# Patient Record
Sex: FEMALE | Race: OTHER | Employment: UNEMPLOYED | URBAN - METROPOLITAN AREA
[De-identification: names, ages, dates, MRNs, and addresses within clinical notes are randomized per-mention and may not be internally consistent; named-entity substitution may affect disease eponyms.]

---

## 2017-04-18 ENCOUNTER — TELEPHONE (OUTPATIENT)
Dept: FAMILY MEDICINE CLINIC | Facility: CLINIC | Age: 52
End: 2017-04-18

## 2017-04-18 DIAGNOSIS — E78.5 HYPERLIPIDEMIA, UNSPECIFIED HYPERLIPIDEMIA TYPE: ICD-10-CM

## 2017-04-18 DIAGNOSIS — I10 ESSENTIAL HYPERTENSION WITH GOAL BLOOD PRESSURE LESS THAN 140/90: Primary | ICD-10-CM

## 2017-04-18 DIAGNOSIS — E55.9 VITAMIN D DEFICIENCY: ICD-10-CM

## 2017-04-19 RX ORDER — LOSARTAN POTASSIUM 25 MG/1
TABLET ORAL
Qty: 90 TABLET | Refills: 0 | Status: SHIPPED | OUTPATIENT
Start: 2017-04-19 | End: 2017-04-26

## 2017-04-19 NOTE — TELEPHONE ENCOUNTER
Pending Prescriptions Disp Refills    Losartan Potassium 25 MG Oral Tab 90 tablet 0     Sig: TAKE ONE TABLET BY MOUTH ONCE DAILY       left message for patient to call back, when patient calls will inform her that labs are needed prior to refill as her l

## 2017-04-26 ENCOUNTER — OFFICE VISIT (OUTPATIENT)
Dept: FAMILY MEDICINE CLINIC | Facility: CLINIC | Age: 52
End: 2017-04-26

## 2017-04-26 VITALS
SYSTOLIC BLOOD PRESSURE: 116 MMHG | OXYGEN SATURATION: 98 % | BODY MASS INDEX: 24.63 KG/M2 | HEIGHT: 63 IN | TEMPERATURE: 98 F | WEIGHT: 139 LBS | DIASTOLIC BLOOD PRESSURE: 82 MMHG | HEART RATE: 78 BPM | RESPIRATION RATE: 16 BRPM

## 2017-04-26 DIAGNOSIS — E55.9 VITAMIN D DEFICIENCY: ICD-10-CM

## 2017-04-26 DIAGNOSIS — Z12.11 ENCOUNTER FOR SCREENING COLONOSCOPY: ICD-10-CM

## 2017-04-26 DIAGNOSIS — Z00.00 ROUTINE GENERAL MEDICAL EXAMINATION AT A HEALTH CARE FACILITY: Primary | ICD-10-CM

## 2017-04-26 DIAGNOSIS — E78.5 HYPERLIPIDEMIA, UNSPECIFIED HYPERLIPIDEMIA TYPE: ICD-10-CM

## 2017-04-26 DIAGNOSIS — I10 ESSENTIAL HYPERTENSION WITH GOAL BLOOD PRESSURE LESS THAN 140/90: ICD-10-CM

## 2017-04-26 PROCEDURE — 99396 PREV VISIT EST AGE 40-64: CPT | Performed by: FAMILY MEDICINE

## 2017-04-26 RX ORDER — ERGOCALCIFEROL 1.25 MG/1
50000 CAPSULE ORAL WEEKLY
Qty: 4 CAPSULE | Refills: 2 | Status: SHIPPED | OUTPATIENT
Start: 2017-04-26 | End: 2017-05-26

## 2017-04-26 RX ORDER — LOSARTAN POTASSIUM 25 MG/1
TABLET ORAL
Qty: 30 TABLET | Refills: 5 | Status: SHIPPED | OUTPATIENT
Start: 2017-04-26 | End: 2017-10-26

## 2017-04-26 NOTE — PATIENT INSTRUCTIONS
Prevention Guidelines, Women Ages 48 to 59  Screening tests and vaccines are an important part of managing your health. Health counseling is essential, too. Below are guidelines for these, for women ages 48 to 59.  Talk with your healthcare provider to ma Lung cancer Adults age 54 to [de-identified] who have smoked Yearly screening in smokers with 30 pack-year history of smoking or who quit within 15 years   Obesity All women in this age group At routine exams   Osteoporosis Women who are postmenopausal Ask your healthc PPSV23: 1 to 2 doses through age 59, or 1 dose at 72 or older (protects against 23 types of pneumococcal bacteria)   Tetanus/diphtheria/pertussis (Td/Tdap) booster All women in this age group Td every 10 years, or a one-time dose of Tdap instead of a Td luther

## 2017-04-26 NOTE — PROGRESS NOTES
Patient presents with:  Physical: room 4--  Lab Results      HPI:  49yr old female with HTN presents for routine adult physical and f/u on labs. Doing well overall. Follows with gyne for wwe, last being about 3yrs ago. No hx of abnl PAPs.  Due for mammogram a week. Disp: 4 capsule Rfl: 2   Losartan Potassium 25 MG Oral Tab TAKE ONE TABLET BY MOUTH ONCE DAILY Disp: 30 tablet Rfl: 5   Fluticasone Propionate 50 MCG/ACT Nasal Suspension 2 sprays by Each Nare route daily.  Disp: 1 Bottle Rfl: 0   Albuterol Sulfate [employer] [email or web address]. aspx? country=[place]    Cervical Cancer Screening:  The USPSTF recommends screening for cervical cancer in women ages 24 to 72 years with cytology (Pap smear) every 3 years or, for women ages 27 to 72 years who want to lengthen t d3 2000u daily otc  - recheck in 4mo  - ergocalciferol 67416 units Oral Cap; Take 1 capsule (50,000 Units total) by mouth once a week. Dispense: 4 capsule;  Refill: 2    Pt understands and agrees with tx plan  RTC in 6mo, sooner if needed      No orders of

## 2017-05-19 ENCOUNTER — TELEPHONE (OUTPATIENT)
Dept: FAMILY MEDICINE CLINIC | Facility: CLINIC | Age: 52
End: 2017-05-19

## 2017-05-19 NOTE — TELEPHONE ENCOUNTER
Peripheral Nerve Block Patient Preparation  Location: Day Surgery  Preanesthetic Checklist:  Patient Identified, Post-Op Pain Mgt at Surgeon Request, Patient Hemodynamically Stable, Risks and Benefits Discussed, Monitors and Equipment Checked, Timeout Performed and Site Marked  Patient Position:  Supine  Sedation:  Midazolam  2 mg   Monitor(s) Used:  NIBP and Pulse Oximetry  Oxygen Supplement:  Room Air  Site Prep:  Cap, Mask, Sterile Gloves and Chloroprep  Requesting Surgeon: Davide      Peripheral Nerve Block Details  Peripheral Nerve Block Type:  Femoral Nerve and Ultrasound Guided  Block Designation: Right  Local Infiltration:  1% Lidocaine  Local Infiltration Volume:  3mL  Needle Type:  Stimulating  Needle Gauge:  22 GG  Needle Length:  9cm  Needle Localization: Ultrasound Guidance     Local Anesthetic:  Ropivacaine  Local Anesthetic Concentration:  0.5%  Local Anesthetic Volume:  30mL  Epinephrine:  None  Aspiration:  Negative  Incremental Injection:  Yes  Paresthesias:  No  Pain on Injection:  No  Catheter Used:  No    Peripheral Nerve Block Note  Pt ID, chart reviewed.  Risks and benefits discussed with patient, agrees to proceed.  Supine.  Chlorhexidine prep.  Ultrasound guidance, in plane technique.  Good anatomical ID of femoral nerve.  1% lido SQ.  #22 9 cm stimplex inserted in plane.  30 cc of 0.5% ropiv injected slowly and incrementally around nerve plexus  No paresthesias.  (+) doughnut sign.  No CNS or CVS Sx.  No heme.  No Cx.  1 image obtained and permanently stored.               Pt called asking for a refill on \"Losartan\". Pharmacy told her no refills left.

## 2017-05-19 NOTE — TELEPHONE ENCOUNTER
Spoke with patients pharmacy and they do have refills for patient, patient was trying to refill on an old prescription bottle. message was left for patient regarding this.

## 2017-10-09 ENCOUNTER — NURSE ONLY (OUTPATIENT)
Dept: FAMILY MEDICINE CLINIC | Facility: CLINIC | Age: 52
End: 2017-10-09

## 2017-10-09 DIAGNOSIS — Z23 NEED FOR MENINGITIS VACCINATION: Primary | ICD-10-CM

## 2017-10-09 PROCEDURE — 90734 MENACWYD/MENACWYCRM VACC IM: CPT | Performed by: NURSE PRACTITIONER

## 2017-10-09 PROCEDURE — 90471 IMMUNIZATION ADMIN: CPT | Performed by: NURSE PRACTITIONER

## 2017-10-26 ENCOUNTER — TELEPHONE (OUTPATIENT)
Dept: FAMILY MEDICINE CLINIC | Facility: CLINIC | Age: 52
End: 2017-10-26

## 2017-10-26 DIAGNOSIS — I10 ESSENTIAL HYPERTENSION WITH GOAL BLOOD PRESSURE LESS THAN 140/90: ICD-10-CM

## 2017-10-26 RX ORDER — LOSARTAN POTASSIUM 25 MG/1
TABLET ORAL
Qty: 90 TABLET | Refills: 0 | Status: SHIPPED | OUTPATIENT
Start: 2017-10-26 | End: 2017-12-05

## 2017-10-26 NOTE — TELEPHONE ENCOUNTER
Signed Prescriptions Disp Refills    Losartan Potassium 25 MG Oral Tab 90 tablet 0      Sig: TAKE ONE TABLET BY MOUTH ONCE DAILY        Authorizing Provider: Talia Tomas        Ordering User: Guzman Shall       patient informed she needs an offi

## 2017-12-05 ENCOUNTER — OFFICE VISIT (OUTPATIENT)
Dept: FAMILY MEDICINE CLINIC | Facility: CLINIC | Age: 52
End: 2017-12-05

## 2017-12-05 VITALS
SYSTOLIC BLOOD PRESSURE: 120 MMHG | HEIGHT: 63 IN | WEIGHT: 139.25 LBS | HEART RATE: 78 BPM | RESPIRATION RATE: 16 BRPM | OXYGEN SATURATION: 98 % | DIASTOLIC BLOOD PRESSURE: 72 MMHG | BODY MASS INDEX: 24.67 KG/M2 | TEMPERATURE: 98 F

## 2017-12-05 DIAGNOSIS — I10 ESSENTIAL HYPERTENSION: Primary | ICD-10-CM

## 2017-12-05 DIAGNOSIS — J30.89 CHRONIC NON-SEASONAL ALLERGIC RHINITIS, UNSPECIFIED TRIGGER: ICD-10-CM

## 2017-12-05 DIAGNOSIS — R05.9 COUGH: ICD-10-CM

## 2017-12-05 PROCEDURE — 99213 OFFICE O/P EST LOW 20 MIN: CPT | Performed by: FAMILY MEDICINE

## 2017-12-05 RX ORDER — LOSARTAN POTASSIUM 25 MG/1
TABLET ORAL
Qty: 90 TABLET | Refills: 1 | Status: SHIPPED | OUTPATIENT
Start: 2017-12-05 | End: 2018-08-27

## 2017-12-05 RX ORDER — ALBUTEROL SULFATE 90 UG/1
2 AEROSOL, METERED RESPIRATORY (INHALATION) EVERY 6 HOURS PRN
Qty: 1 INHALER | Refills: 2 | Status: SHIPPED | OUTPATIENT
Start: 2017-12-05 | End: 2018-08-31 | Stop reason: ALTCHOICE

## 2017-12-08 NOTE — PROGRESS NOTES
Robert Kohler is a 46year old female. HPI:   Patient presents for recheck of her hypertension. Pt has been taking medications as instructed, no medication side effects, home BP monitoring wnl.   Returned from a trip to [facility] about 10d ago is recoveri Used                      Alcohol use:  No                  REVIEW OF SYSTEMS:   GENERAL HEALTH: feels well otherwise  SKIN: denies any unusual skin lesions or rashes  RESPIRATORY: denies shortness of breath with exertion  CARDIOVASCULAR: denies chest pain

## 2017-12-15 ENCOUNTER — TELEPHONE (OUTPATIENT)
Dept: FAMILY MEDICINE CLINIC | Facility: CLINIC | Age: 52
End: 2017-12-15

## 2017-12-15 DIAGNOSIS — E78.5 HYPERLIPIDEMIA, UNSPECIFIED HYPERLIPIDEMIA TYPE: Primary | ICD-10-CM

## 2017-12-15 DIAGNOSIS — E55.9 VITAMIN D DEFICIENCY: ICD-10-CM

## 2017-12-15 DIAGNOSIS — I10 ESSENTIAL HYPERTENSION: ICD-10-CM

## 2017-12-15 NOTE — TELEPHONE ENCOUNTER
Pt called stating orders were to be put in to check her \"Vitamin D level & Cholesterol\" as per the last appointment.  Wants to have blood drawn today within the hour since she is currently fasting. ([facility])

## 2017-12-18 ENCOUNTER — TELEPHONE (OUTPATIENT)
Dept: FAMILY MEDICINE CLINIC | Facility: CLINIC | Age: 52
End: 2017-12-18

## 2017-12-18 DIAGNOSIS — E55.9 VITAMIN D DEFICIENCY: Primary | ICD-10-CM

## 2017-12-18 RX ORDER — ERGOCALCIFEROL 1.25 MG/1
50000 CAPSULE ORAL WEEKLY
Qty: 4 CAPSULE | Refills: 2 | Status: SHIPPED | OUTPATIENT
Start: 2017-12-18 | End: 2018-01-17

## 2017-12-18 NOTE — TELEPHONE ENCOUNTER
Patient informed of test results per 's directive and verbalized understanding, she stated she did not need gel free capsules and ok to send [facility]

## 2017-12-18 NOTE — TELEPHONE ENCOUNTER
----- Message from [facility], DO sent at 12/18/2017  9:05 AM CST -----  Pls inform pt that lipid panel has improved from previous, cont healthy eating habits, low fat/chol diet and regular exercise.  Will not need medication for cholesterol at th

## 2018-02-18 ENCOUNTER — APPOINTMENT (OUTPATIENT)
Dept: GENERAL RADIOLOGY | Facility: HOSPITAL | Age: 53
End: 2018-02-18
Attending: EMERGENCY MEDICINE
Payer: COMMERCIAL

## 2018-02-18 ENCOUNTER — HOSPITAL ENCOUNTER (EMERGENCY)
Facility: HOSPITAL | Age: 53
Discharge: HOME | End: 2018-02-19
Attending: EMERGENCY MEDICINE
Payer: COMMERCIAL

## 2018-02-18 DIAGNOSIS — E83.39 HYPOPHOSPHATEMIA: ICD-10-CM

## 2018-02-18 DIAGNOSIS — R53.1 WEAKNESS GENERALIZED: Primary | ICD-10-CM

## 2018-02-18 LAB
ALBUMIN SERPL-MCNC: 3.9 G/DL (ref 3.5–4.8)
ALP LIVER SERPL-CCNC: 66 U/L (ref 41–108)
ALT SERPL-CCNC: 19 U/L (ref 14–54)
AST SERPL-CCNC: 13 U/L (ref 15–41)
BASOPHILS # BLD AUTO: 0.02 X10(3) UL (ref 0–0.1)
BASOPHILS NFR BLD AUTO: 0.4 %
BILIRUB SERPL-MCNC: 0.4 MG/DL (ref 0.1–2)
BUN BLD-MCNC: 13 MG/DL (ref 8–20)
CALCIUM BLD-MCNC: 8.8 MG/DL (ref 8.3–10.3)
CHLORIDE: 109 MMOL/L (ref 101–111)
CO2: 24 MMOL/L (ref 22–32)
CREAT BLD-MCNC: 0.65 MG/DL (ref 0.55–1.02)
EOSINOPHIL # BLD AUTO: 0.11 X10(3) UL (ref 0–0.3)
EOSINOPHIL NFR BLD AUTO: 2.2 %
ERYTHROCYTE [DISTWIDTH] IN BLOOD BY AUTOMATED COUNT: 13.3 % (ref 11.5–16)
GLUCOSE BLD-MCNC: 144 MG/DL (ref 70–99)
HAV IGM SER QL: 1.9 MG/DL (ref 1.7–3)
HCT VFR BLD AUTO: 37.9 % (ref 34–50)
HGB BLD-MCNC: 12.5 G/DL (ref 12–16)
IMMATURE GRANULOCYTE COUNT: 0.01 X10(3) UL (ref 0–1)
IMMATURE GRANULOCYTE RATIO %: 0.2 %
LYMPHOCYTES # BLD AUTO: 2.25 X10(3) UL (ref 0.9–4)
LYMPHOCYTES NFR BLD AUTO: 44.6 %
M PROTEIN MFR SERPL ELPH: 7.1 G/DL (ref 6.1–8.3)
MCH RBC QN AUTO: 28.4 PG (ref 27–33.2)
MCHC RBC AUTO-ENTMCNC: 33 G/DL (ref 31–37)
MCV RBC AUTO: 86.1 FL (ref 81–100)
MONOCYTES # BLD AUTO: 0.49 X10(3) UL (ref 0.1–1)
MONOCYTES NFR BLD AUTO: 9.7 %
NEUTROPHIL ABS PRELIM: 2.16 X10 (3) UL (ref 1.3–6.7)
NEUTROPHILS # BLD AUTO: 2.16 X10(3) UL (ref 1.3–6.7)
NEUTROPHILS NFR BLD AUTO: 42.9 %
PHOSPHATE SERPL-MCNC: 1.7 MG/DL (ref 2.5–4.9)
PLATELET # BLD AUTO: 203 10(3)UL (ref 150–450)
POTASSIUM SERPL-SCNC: 3.5 MMOL/L (ref 3.6–5.1)
RBC # BLD AUTO: 4.4 X10(6)UL (ref 3.8–5.1)
RED CELL DISTRIBUTION WIDTH-SD: 41.3 FL (ref 35.1–46.3)
SODIUM SERPL-SCNC: 141 MMOL/L (ref 136–144)
TROPONIN: <0.046 NG/ML (ref ?–0.05)
TSI SER-ACNC: 3.09 MIU/ML (ref 0.35–5.5)
WBC # BLD AUTO: 5 X10(3) UL (ref 4–13)

## 2018-02-18 PROCEDURE — 84484 ASSAY OF TROPONIN QUANT: CPT | Performed by: EMERGENCY MEDICINE

## 2018-02-18 PROCEDURE — 93010 ELECTROCARDIOGRAM REPORT: CPT

## 2018-02-18 PROCEDURE — 84100 ASSAY OF PHOSPHORUS: CPT | Performed by: EMERGENCY MEDICINE

## 2018-02-18 PROCEDURE — 99285 EMERGENCY DEPT VISIT HI MDM: CPT

## 2018-02-18 PROCEDURE — 93005 ELECTROCARDIOGRAM TRACING: CPT

## 2018-02-18 PROCEDURE — 71045 X-RAY EXAM CHEST 1 VIEW: CPT | Performed by: EMERGENCY MEDICINE

## 2018-02-18 PROCEDURE — 83735 ASSAY OF MAGNESIUM: CPT | Performed by: EMERGENCY MEDICINE

## 2018-02-18 PROCEDURE — 80053 COMPREHEN METABOLIC PANEL: CPT | Performed by: EMERGENCY MEDICINE

## 2018-02-18 PROCEDURE — 84443 ASSAY THYROID STIM HORMONE: CPT | Performed by: EMERGENCY MEDICINE

## 2018-02-18 PROCEDURE — 36415 COLL VENOUS BLD VENIPUNCTURE: CPT

## 2018-02-18 PROCEDURE — 85025 COMPLETE CBC W/AUTO DIFF WBC: CPT | Performed by: EMERGENCY MEDICINE

## 2018-02-18 RX ORDER — ASPIRIN 81 MG/1
324 TABLET, CHEWABLE ORAL ONCE
Status: COMPLETED | OUTPATIENT
Start: 2018-02-18 | End: 2018-02-18

## 2018-02-19 ENCOUNTER — APPOINTMENT (OUTPATIENT)
Dept: CT IMAGING | Facility: HOSPITAL | Age: 53
End: 2018-02-19
Attending: EMERGENCY MEDICINE
Payer: COMMERCIAL

## 2018-02-19 VITALS
HEART RATE: 62 BPM | RESPIRATION RATE: 14 BRPM | TEMPERATURE: 99 F | WEIGHT: 140 LBS | HEIGHT: 63 IN | SYSTOLIC BLOOD PRESSURE: 123 MMHG | OXYGEN SATURATION: 98 % | BODY MASS INDEX: 24.8 KG/M2 | DIASTOLIC BLOOD PRESSURE: 80 MMHG

## 2018-02-19 LAB
ATRIAL RATE: 96 BPM
P AXIS: 57 DEGREES
P-R INTERVAL: 146 MS
Q-T INTERVAL: 372 MS
QRS DURATION: 96 MS
QTC CALCULATION (BEZET): 469 MS
R AXIS: -40 DEGREES
T AXIS: 24 DEGREES
VENTRICULAR RATE: 96 BPM

## 2018-02-19 PROCEDURE — 70498 CT ANGIOGRAPHY NECK: CPT | Performed by: EMERGENCY MEDICINE

## 2018-02-19 PROCEDURE — 70496 CT ANGIOGRAPHY HEAD: CPT | Performed by: EMERGENCY MEDICINE

## 2018-02-19 NOTE — ED INITIAL ASSESSMENT (HPI)
Onset one hour ago with elevated HR, high BP, shivering and stiffness. Pt reports that this has occurred before while trying to sleep. She reports a sensation of \"heaviness. \"

## 2018-02-19 NOTE — ED NOTES
Pt resting on cart. Alert- skin p/w/d, breathing non labored and with ease. Updated to plan for DC, pt denies pain and IV removed with cath intact and bandage placed. DC papers given to pt, aware of f./u care and cond to return to the ED with.  Instructed o

## 2018-02-19 NOTE — ED PROVIDER NOTES
Patient Seen in: [facility] Emergency Department    History   Patient presents with:  Hypertension (cardiovascular)    Stated Complaint: elevated heart rate, htn, shivering, stiffness - denies fever    HPI    Patient is a 60-year-old female comes em appearing and non-toxic, Alert and oriented X 3   HEENT: Normocephalic, atraumatic. Moist mucous membranes. Pupils equal round reactive to light accommodation, extraocular motion is intact, sclerae white, conjunctiva is pink.   Oropharynx is unremarkable, DIFFERENTIAL WITH PLATELET.   Procedure                               Abnormality         Status                     ---------                               -----------         ------                     CBC W/ DIFFERENTIAL[[id number]] verbal and written discharge and follow-up instructions were provided to help prevent relapse or worsening.   Patient was instructed to follow-up with her primary care provider for further evaluation and treatment, but to return immediately to the ER for wo

## 2018-02-22 ENCOUNTER — TELEPHONE (OUTPATIENT)
Dept: FAMILY MEDICINE CLINIC | Facility: CLINIC | Age: 53
End: 2018-02-22

## 2018-02-24 NOTE — PROGRESS NOTES
HPI:    Patient ID: Rosalina Cool is a 46year old female. HPI   52yr old female with HTN and chronic cough presents for f/u after recent ER visit on 2/19 for generalized weakness.  Had gotten up to go to the bathroom on 2/19 and started shivering and Propionate 50 MCG/ACT Nasal Suspension 2 sprays by Each Nare route daily. Disp: 1 Bottle Rfl: 0   potassium and sodium phosphates 305-700 MG Oral Tab Take 1 tablet by mouth 2 (two) times daily.  Disp: 7 tablet Rfl: 0     Allergies:No Known Allergies   PHYSI 1mo for re-eval, sooner if needed      Orders Placed This Encounter      Phosphorus [E]      Potassium [E]    Meds This Visit:  Signed Prescriptions Disp Refills    ALPRAZolam 0.25 MG Oral Tab 20 tablet 0      Sig: Take 1 tablet (0.25 mg total) by mouth ni

## 2018-08-27 DIAGNOSIS — I10 ESSENTIAL HYPERTENSION: ICD-10-CM

## 2018-08-27 NOTE — TELEPHONE ENCOUNTER
Patient is scheduled for Friday with Dr for med refills, but only has b/p medicine for 2 more days, requesting a few days supply until appointment

## 2018-08-28 RX ORDER — LOSARTAN POTASSIUM 25 MG/1
TABLET ORAL
Qty: 5 TABLET | Refills: 0 | Status: SHIPPED | OUTPATIENT
Start: 2018-08-28 | End: 2018-08-31

## 2018-08-28 NOTE — TELEPHONE ENCOUNTER
Losartan Potassium 25 MG Oral Tab  TAKE ONE TABLET BY MOUTH ONCE DAILY       Disp: 30 tablet Refills: 0    Class: Normal Start: 8/27/2018   For: Essential hypertension  Documented:2 years ago  Hypertension Medications Protocol Passed8/27 11:00 AM   CMP or

## 2018-08-29 DIAGNOSIS — I10 ESSENTIAL HYPERTENSION: ICD-10-CM

## 2018-08-30 NOTE — TELEPHONE ENCOUNTER
LOSARTAN 25MG TAB  Will file in chart as: LOSARTAN POTASSIUM 25 MG Oral Tab  The source prescription was reordered on 8/28/2018 by Mandy Graves.   TAKE ONE TABLET BY MOUTH ONCE DAILY       Disp: 90 tablet Refills: 1    Class: Normal Start: 8/29/2018   For:

## 2018-08-31 ENCOUNTER — OFFICE VISIT (OUTPATIENT)
Dept: FAMILY MEDICINE CLINIC | Facility: CLINIC | Age: 53
End: 2018-08-31
Payer: COMMERCIAL

## 2018-08-31 VITALS
RESPIRATION RATE: 18 BRPM | TEMPERATURE: 98 F | HEART RATE: 64 BPM | OXYGEN SATURATION: 99 % | SYSTOLIC BLOOD PRESSURE: 136 MMHG | DIASTOLIC BLOOD PRESSURE: 80 MMHG | WEIGHT: 133 LBS | HEIGHT: 63 IN | BODY MASS INDEX: 23.57 KG/M2

## 2018-08-31 DIAGNOSIS — E78.49 OTHER HYPERLIPIDEMIA: ICD-10-CM

## 2018-08-31 DIAGNOSIS — E83.39 HYPOPHOSPHATEMIA: ICD-10-CM

## 2018-08-31 DIAGNOSIS — Z00.00 LABORATORY EXAMINATION ORDERED AS PART OF A COMPLETE PHYSICAL EXAMINATION: ICD-10-CM

## 2018-08-31 DIAGNOSIS — Z13.21 ENCOUNTER FOR VITAMIN DEFICIENCY SCREENING: ICD-10-CM

## 2018-08-31 DIAGNOSIS — E55.9 VITAMIN D DEFICIENCY: ICD-10-CM

## 2018-08-31 DIAGNOSIS — E87.6 HYPOKALEMIA: ICD-10-CM

## 2018-08-31 DIAGNOSIS — I10 ESSENTIAL HYPERTENSION: Primary | ICD-10-CM

## 2018-08-31 PROCEDURE — 99213 OFFICE O/P EST LOW 20 MIN: CPT | Performed by: FAMILY MEDICINE

## 2018-08-31 RX ORDER — LOSARTAN POTASSIUM 25 MG/1
TABLET ORAL
Qty: 90 TABLET | Refills: 2 | Status: SHIPPED | OUTPATIENT
Start: 2018-08-31 | End: 2019-01-15

## 2018-08-31 RX ORDER — ACETAMINOPHEN 160 MG
2000 TABLET,DISINTEGRATING ORAL DAILY
Qty: 30 CAPSULE | Refills: 1 | OUTPATIENT
Start: 2018-08-31 | End: 2020-04-08

## 2018-08-31 RX ORDER — LOSARTAN POTASSIUM 25 MG/1
TABLET ORAL
Qty: 90 TABLET | Refills: 1 | OUTPATIENT
Start: 2018-08-31

## 2018-08-31 NOTE — H&P
Deb Lowry is a 46year old female. HPI:  Menses regular, normal flow. Sometimes heavier than usual  Lasts for about 9days.         Current Outpatient Prescriptions:  Losartan Potassium 25 MG Oral Tab TAKE ONE TABLET BY MOUTH ONCE DAILY Disp: 5 tablet

## 2018-08-31 NOTE — PROGRESS NOTES
Quentin Williamson is a 46year old female. HPI:  Pt is here for f/u on BP and meds  Pt has been taking Losartan 25mg daily  Ran out of meds 2 days ago and waiting for refill.    BP elevations few months ago were triggered by anxiety, which is much better now well developed, well nourished,in no apparent distress, pleasant affect  SKIN: no rashes,no suspicious lesions  HEENT: atraumatic, normocephalic,  Conj clear  NECK: supple,no adenopathy,noTM  LUNGS: clear to auscultation  CARDIO: RRR without murmur  GI: NA

## 2018-09-17 ENCOUNTER — TELEPHONE (OUTPATIENT)
Dept: FAMILY MEDICINE CLINIC | Facility: CLINIC | Age: 53
End: 2018-09-17

## 2018-09-17 DIAGNOSIS — E04.2 MULTIPLE THYROID NODULES: ICD-10-CM

## 2018-09-17 DIAGNOSIS — Z13.21 ENCOUNTER FOR VITAMIN DEFICIENCY SCREENING: ICD-10-CM

## 2018-09-17 DIAGNOSIS — Z00.00 LABORATORY EXAMINATION ORDERED AS PART OF A ROUTINE GENERAL MEDICAL EXAMINATION: Primary | ICD-10-CM

## 2018-09-17 DIAGNOSIS — E83.39 HYPOPHOSPHATASIA: ICD-10-CM

## 2018-09-17 DIAGNOSIS — Z12.31 SCREENING MAMMOGRAM, ENCOUNTER FOR: ICD-10-CM

## 2018-09-26 LAB
ABSOLUTE BASOPHILS: 21 CELLS/UL (ref 0–200)
ABSOLUTE EOSINOPHILS: 109 CELLS/UL (ref 15–500)
ABSOLUTE LYMPHOCYTES: 1898 CELLS/UL (ref 850–3900)
ABSOLUTE MONOCYTES: 349 CELLS/UL (ref 200–950)
ABSOLUTE NEUTROPHILS: 1823 CELLS/UL (ref 1500–7800)
ALBUMIN/GLOBULIN RATIO: 1.7 (CALC) (ref 1–2.5)
ALBUMIN: 4.5 G/DL (ref 3.6–5.1)
ALKALINE PHOSPHATASE: 51 U/L (ref 33–130)
ALT: 10 U/L (ref 6–29)
AST: 11 U/L (ref 10–35)
BASOPHILS: 0.5 %
BILIRUBIN, TOTAL: 0.4 MG/DL (ref 0.2–1.2)
BUN: 12 MG/DL (ref 7–25)
CALCIUM: 9.4 MG/DL (ref 8.6–10.4)
CARBON DIOXIDE: 27 MMOL/L (ref 20–32)
CHLORIDE: 107 MMOL/L (ref 98–110)
CHOL/HDLC RATIO: 3.5 (CALC)
CHOLESTEROL, TOTAL: 194 MG/DL
CREATININE: 0.76 MG/DL (ref 0.5–1.05)
EGFR IF AFRICN AM: 105 ML/MIN/1.73M2
EGFR IF NONAFRICN AM: 90 ML/MIN/1.73M2
EOSINOPHILS: 2.6 %
GLOBULIN: 2.7 G/DL (CALC) (ref 1.9–3.7)
GLUCOSE: 98 MG/DL (ref 65–99)
HDL CHOLESTEROL: 55 MG/DL
HEMATOCRIT: 41.5 % (ref 35–45)
HEMOGLOBIN: 13.5 G/DL (ref 11.7–15.5)
LDL-CHOLESTEROL: 119 MG/DL (CALC)
LYMPHOCYTES: 45.2 %
MCH: 27.9 PG (ref 27–33)
MCHC: 32.5 G/DL (ref 32–36)
MCV: 85.7 FL (ref 80–100)
MONOCYTES: 8.3 %
MPV: 11.8 FL (ref 7.5–12.5)
NEUTROPHILS: 43.4 %
NON-HDL CHOLESTEROL: 139 MG/DL (CALC)
PHOSPHATE (AS PHOSPHORUS): 3.1 MG/DL (ref 2.5–4.5)
PLATELET COUNT: 210 THOUSAND/UL (ref 140–400)
POTASSIUM: 4.6 MMOL/L (ref 3.5–5.3)
PROTEIN, TOTAL: 7.2 G/DL (ref 6.1–8.1)
RDW: 13.5 % (ref 11–15)
RED BLOOD CELL COUNT: 4.84 MILLION/UL (ref 3.8–5.1)
SODIUM: 139 MMOL/L (ref 135–146)
TRIGLYCERIDES: 95 MG/DL
TSH W/REFLEX TO FT4: 1.23 MIU/L
VITAMIN D, 25-OH, TOTAL: 19 NG/ML (ref 30–100)
WHITE BLOOD CELL COUNT: 4.2 THOUSAND/UL (ref 3.8–10.8)

## 2018-11-02 ENCOUNTER — TELEPHONE (OUTPATIENT)
Dept: FAMILY MEDICINE CLINIC | Facility: CLINIC | Age: 53
End: 2018-11-02

## 2018-11-12 NOTE — TELEPHONE ENCOUNTER
Written by Rosie Kulkarni MD on 11/1/2018  2:54 PM   [employer] Message: Labs show normal fasting blood sugar.  Cholesterol is slightly elevated compared to previous.  Low-fat/low-cholesterol diet.  Vitamin D level is low.  Please make sure to be taking vi

## 2018-11-12 NOTE — TELEPHONE ENCOUNTER
Called patient and reviewed lab results per Dr. Larisa Rodarte note. States she has not been taking Vitamin D3 supplement. States she has vertigo and thought the Vitamin D3 might be making it worse.   She will try to start taking it again and monitor for side e

## 2018-11-12 NOTE — TELEPHONE ENCOUNTER
Patient stopped by to get Lab Print-out. Having difficulty logging into [employer] from home. Right now does not have INS so she declined to schedule requested Physical and Lab Review Appt.     Wanted to know if Dr. Rex Marcelino can call her to discuss Labs (ove

## 2019-01-07 ENCOUNTER — TELEPHONE (OUTPATIENT)
Dept: FAMILY MEDICINE CLINIC | Facility: CLINIC | Age: 54
End: 2019-01-07

## 2019-01-07 NOTE — TELEPHONE ENCOUNTER
Patient called and stated her insurance won't cover mammogram and x-ray through [facility], needs it changed to [facility] phone # [phone]. Also said insurance told her she needed a referral, but she did not know what for.

## 2019-01-07 NOTE — TELEPHONE ENCOUNTER
Order for [place] of Thyroid and Screening mammogram faxed to [address] center at [phone].

## 2019-01-15 ENCOUNTER — OFFICE VISIT (OUTPATIENT)
Dept: FAMILY MEDICINE CLINIC | Facility: CLINIC | Age: 54
End: 2019-01-15
Payer: MEDICAID

## 2019-01-15 VITALS
BODY MASS INDEX: 23.27 KG/M2 | OXYGEN SATURATION: 99 % | RESPIRATION RATE: 16 BRPM | DIASTOLIC BLOOD PRESSURE: 70 MMHG | SYSTOLIC BLOOD PRESSURE: 130 MMHG | TEMPERATURE: 98 F | HEART RATE: 80 BPM | HEIGHT: 63.58 IN | WEIGHT: 133 LBS

## 2019-01-15 DIAGNOSIS — E04.2 MULTIPLE THYROID NODULES: ICD-10-CM

## 2019-01-15 DIAGNOSIS — R21 RASH: ICD-10-CM

## 2019-01-15 DIAGNOSIS — Z01.419 WELL WOMAN EXAM WITH ROUTINE GYNECOLOGICAL EXAM: Primary | ICD-10-CM

## 2019-01-15 DIAGNOSIS — E55.9 VITAMIN D DEFICIENCY: ICD-10-CM

## 2019-01-15 DIAGNOSIS — Z12.11 SCREEN FOR COLON CANCER: ICD-10-CM

## 2019-01-15 DIAGNOSIS — I10 ESSENTIAL HYPERTENSION: ICD-10-CM

## 2019-01-15 PROCEDURE — 87624 HPV HI-RISK TYP POOLED RSLT: CPT | Performed by: FAMILY MEDICINE

## 2019-01-15 PROCEDURE — 99396 PREV VISIT EST AGE 40-64: CPT | Performed by: FAMILY MEDICINE

## 2019-01-15 RX ORDER — LOSARTAN POTASSIUM 25 MG/1
TABLET ORAL
Qty: 90 TABLET | Refills: 2 | Status: SHIPPED | OUTPATIENT
Start: 2019-01-15 | End: 2019-06-11

## 2019-01-15 NOTE — PROGRESS NOTES
Pauly Daigle is a 48year old female. HPI:  Pt is here for physical/well woman exam.  Doing well overall  Scheduled for MMG later this week  Note a rough patch on R breast, at nipple for 3 months. No change in size.    Sometimes itchiness of R breast at HEALTH: feels well otherwise, mood is good  SKIN: As above  RESPIRATORY: denies shortness of breath with exertion, no cough  CARDIOVASCULAR: denies chest pain on exertion  GI: denies abdominal pain and denies heartburn  : normal bladder  NEURO: denies he indication. Patient defers flu vaccine. Understands risk of not being vaccinated. PAP smear done today with HPV testing  Menstrual pattern appears to be related to perimenopausal state. Will monitor.   If notes menses become heavier or more frequent, sh

## 2019-01-17 ENCOUNTER — TELEPHONE (OUTPATIENT)
Dept: FAMILY MEDICINE CLINIC | Facility: CLINIC | Age: 54
End: 2019-01-17

## 2019-01-17 NOTE — TELEPHONE ENCOUNTER
Patient has Thyroid ultrasound scheduled for 1/19 and patients insurances requested last office note for insurance approval .

## 2019-01-20 LAB — HPV I/H RISK 1 DNA SPEC QL NAA+PROBE: NEGATIVE

## 2019-01-20 PROCEDURE — 82274 ASSAY TEST FOR BLOOD FECAL: CPT | Performed by: FAMILY MEDICINE

## 2019-01-28 ENCOUNTER — TELEPHONE (OUTPATIENT)
Dept: FAMILY MEDICINE CLINIC | Facility: CLINIC | Age: 54
End: 2019-01-28

## 2019-01-28 NOTE — TELEPHONE ENCOUNTER
Called patient and advised of normal mammogram done at [facility]. Asking if Dr. Hallie Avila has seen Thyroid US result yet and what the results of her PAP was. Informed Dr. Hallie Avila sent her a [employer] message with normal PAP result.   States she

## 2019-01-28 NOTE — TELEPHONE ENCOUNTER
Hello Ladies,  Will one of you please call patient and try to assist her with MyChart set-up. Thanks.

## 2019-01-29 NOTE — TELEPHONE ENCOUNTER
annette for pt to call office for help setting up my chart, also left her # to my chart dept [phone]

## 2019-02-22 NOTE — TELEPHONE ENCOUNTER
Dr Cirilo Sellers, I must have looked at the wrong name    Dr. Erika Preston, Please Advise    No future appointments.     LOV      8/31/18-  Advised to follow up  In 3 months     Last Labs           2/18/18     What labs would you like ordered prior to next appt
No future appointments. LOV 8/31/18-  Advised to follow up  In 3 months    Dr. Gisel Torres, I am assuming patient should make appointment with you, not Dr. Vince Moya. Is this correct?      Last Labs 2/18/18    What labs would you like ordered prior to next appt
No future appointments. Patient called and informed of orders per Dr. Melany Omer note. Informed labs should be done while she is fasting at [facility]. Orders faxed to [facility] for mammogram and thyroid ultrasound.   Contact number to ord
Orders for labs entered at [address] per pt request. Please notify pt to get done fasting  Please enter order for Bilat screening MMG at [facility]  Also, please let pt know that I reviewed her chart after her last ov and noted that with her las
Patient LVM stating she needs Dr Anuradha Ford to place lab orders through [address] [place] [address].  She is also requesting a mammogram be ordered for Zoran Conway.     She would like a returned call once orders have been placed so she will know she can schedule the appointme
Patient left voicemail stating that [facility] is requiring previous record of mammogram, or to change order to [facility] so she can schedule appointment, said it is urgent since insurance ends this month,
Previous mammogram report from 2014 faxed to [facility] and patient notified.
Pt seen recently by [facility], unsure what labs were discussed at ov. Pls refer to [facility].
H/O colonoscopy

## 2019-05-30 ENCOUNTER — TELEPHONE (OUTPATIENT)
Dept: FAMILY MEDICINE CLINIC | Facility: CLINIC | Age: 54
End: 2019-05-30

## 2019-05-30 NOTE — TELEPHONE ENCOUNTER
Detailed VMML left for patient with recommendation per Dr. Isaac Linares note. Advised if not improvement she can return call for an appointment or go to a Walk in Clinic.

## 2019-05-30 NOTE — TELEPHONE ENCOUNTER
Pt called to schedule an appointment for today in regard to a Stye in her Eye. Pt has no insurance until June 1st.  (confirmed with [facility] today ref # [id number]). Asked to speak to nurse to find out if something can be prescribed.      Pt di

## 2019-06-10 ENCOUNTER — TELEPHONE (OUTPATIENT)
Dept: FAMILY MEDICINE CLINIC | Facility: CLINIC | Age: 54
End: 2019-06-10

## 2019-06-10 NOTE — TELEPHONE ENCOUNTER
Returned call to patient who states her menses are a little heavier. They are every 17 days and last for approx. 10 days.  Denies any dizziness, or extreme fatigue Advised per Dr. Kerry Aguilera note, if menses were heavier she wanted her to come in for follow u

## 2019-06-10 NOTE — TELEPHONE ENCOUNTER
Pt called for pelvic usg order to [facility], said  discussed with her at last office visit if menstrual cycle continued to be heavy she would order test

## 2019-06-11 ENCOUNTER — OFFICE VISIT (OUTPATIENT)
Dept: FAMILY MEDICINE CLINIC | Facility: CLINIC | Age: 54
End: 2019-06-11
Payer: MEDICAID

## 2019-06-11 VITALS
WEIGHT: 129.81 LBS | SYSTOLIC BLOOD PRESSURE: 118 MMHG | OXYGEN SATURATION: 98 % | HEIGHT: 63.58 IN | BODY MASS INDEX: 22.72 KG/M2 | HEART RATE: 68 BPM | TEMPERATURE: 98 F | RESPIRATION RATE: 16 BRPM | DIASTOLIC BLOOD PRESSURE: 68 MMHG

## 2019-06-11 DIAGNOSIS — N92.0 MENORRHAGIA WITH REGULAR CYCLE: Primary | ICD-10-CM

## 2019-06-11 DIAGNOSIS — Z51.81 ENCOUNTER FOR MEDICATION MONITORING: ICD-10-CM

## 2019-06-11 DIAGNOSIS — I10 ESSENTIAL HYPERTENSION: ICD-10-CM

## 2019-06-11 DIAGNOSIS — E55.9 VITAMIN D DEFICIENCY: ICD-10-CM

## 2019-06-11 PROCEDURE — 99213 OFFICE O/P EST LOW 20 MIN: CPT | Performed by: FAMILY MEDICINE

## 2019-06-11 RX ORDER — LOSARTAN POTASSIUM 25 MG/1
TABLET ORAL
Qty: 90 TABLET | Refills: 2 | Status: SHIPPED | OUTPATIENT
Start: 2019-06-11 | End: 2019-09-27

## 2019-06-11 NOTE — PROGRESS NOTES
Lars Christian is a 48year old female. HPI:  Patient is here for evaluation of heavy menstrual cycles. Menses more frequent in the last couple of cycles. About 217-18 days apart.   Prior to that, in the last 9-10 months , have been about every 20 to 21 lb  08/31/18 : 133 lb  02/23/18 : 136 lb 12.8 oz  02/18/18 : 140 lb  12/05/17 : 139 lb 4 oz    GENERAL: well developed, well nourished,in no apparent distress, pleasant affect  NECK: supple,no adenopathy,noTM  LUNGS: clear to auscultation  CARDIO: RRR with

## 2019-06-13 ENCOUNTER — HOSPITAL ENCOUNTER (OUTPATIENT)
Dept: ULTRASOUND IMAGING | Age: 54
Discharge: HOME | End: 2019-06-13
Attending: FAMILY MEDICINE
Payer: MEDICAID

## 2019-06-13 DIAGNOSIS — N92.0 MENORRHAGIA WITH REGULAR CYCLE: ICD-10-CM

## 2019-06-13 PROCEDURE — 76856 US EXAM PELVIC COMPLETE: CPT | Performed by: FAMILY MEDICINE

## 2019-06-13 PROCEDURE — 76830 TRANSVAGINAL US NON-OB: CPT | Performed by: FAMILY MEDICINE

## 2019-07-09 ENCOUNTER — TELEPHONE (OUTPATIENT)
Dept: FAMILY MEDICINE CLINIC | Facility: CLINIC | Age: 54
End: 2019-07-09

## 2019-07-09 NOTE — TELEPHONE ENCOUNTER
Pt had pelvic ultrasound on 6/13/19 and says no one ever called her with the results. Wants to discuss results.

## 2019-07-10 ENCOUNTER — LAB ENCOUNTER (OUTPATIENT)
Dept: LAB | Age: 54
End: 2019-07-10
Attending: FAMILY MEDICINE
Payer: MEDICAID

## 2019-07-10 DIAGNOSIS — N92.0 MENORRHAGIA WITH REGULAR CYCLE: ICD-10-CM

## 2019-07-10 DIAGNOSIS — I10 ESSENTIAL HYPERTENSION: ICD-10-CM

## 2019-07-10 DIAGNOSIS — Z51.81 ENCOUNTER FOR MEDICATION MONITORING: ICD-10-CM

## 2019-07-10 LAB
ALBUMIN SERPL-MCNC: 4 G/DL (ref 3.4–5)
ALBUMIN/GLOB SERPL: 1.2 {RATIO} (ref 1–2)
ALP LIVER SERPL-CCNC: 55 U/L (ref 41–108)
ALT SERPL-CCNC: 25 U/L (ref 13–56)
ANION GAP SERPL CALC-SCNC: 7 MMOL/L (ref 0–18)
AST SERPL-CCNC: 15 U/L (ref 15–37)
BASOPHILS # BLD AUTO: 0.02 X10(3) UL (ref 0–0.2)
BASOPHILS NFR BLD AUTO: 0.5 %
BILIRUB SERPL-MCNC: 0.4 MG/DL (ref 0.1–2)
BUN BLD-MCNC: 9 MG/DL (ref 7–18)
BUN/CREAT SERPL: 12.9 (ref 10–20)
CALCIUM BLD-MCNC: 9.1 MG/DL (ref 8.5–10.1)
CHLORIDE SERPL-SCNC: 107 MMOL/L (ref 98–112)
CO2 SERPL-SCNC: 25 MMOL/L (ref 21–32)
CREAT BLD-MCNC: 0.7 MG/DL (ref 0.55–1.02)
DEPRECATED RDW RBC AUTO: 44.5 FL (ref 35.1–46.3)
EOSINOPHIL # BLD AUTO: 0.08 X10(3) UL (ref 0–0.7)
EOSINOPHIL NFR BLD AUTO: 1.9 %
ERYTHROCYTE [DISTWIDTH] IN BLOOD BY AUTOMATED COUNT: 13.7 % (ref 11–15)
FSH SERPL-ACNC: 4.5 MIU/ML
GLOBULIN PLAS-MCNC: 3.4 G/DL (ref 2.8–4.4)
GLUCOSE BLD-MCNC: 80 MG/DL (ref 70–99)
HCT VFR BLD AUTO: 41.9 % (ref 35–48)
HGB BLD-MCNC: 13.1 G/DL (ref 12–16)
IMM GRANULOCYTES # BLD AUTO: 0.01 X10(3) UL (ref 0–1)
IMM GRANULOCYTES NFR BLD: 0.2 %
LH SERPL-ACNC: 4.4 MIU/ML
LYMPHOCYTES # BLD AUTO: 1.6 X10(3) UL (ref 1–4)
LYMPHOCYTES NFR BLD AUTO: 38.8 %
M PROTEIN MFR SERPL ELPH: 7.4 G/DL (ref 6.4–8.2)
MCH RBC QN AUTO: 27.8 PG (ref 26–34)
MCHC RBC AUTO-ENTMCNC: 31.3 G/DL (ref 31–37)
MCV RBC AUTO: 89 FL (ref 80–100)
MONOCYTES # BLD AUTO: 0.4 X10(3) UL (ref 0.1–1)
MONOCYTES NFR BLD AUTO: 9.7 %
NEUTROPHILS # BLD AUTO: 2.01 X10 (3) UL (ref 1.5–7.7)
NEUTROPHILS # BLD AUTO: 2.01 X10(3) UL (ref 1.5–7.7)
NEUTROPHILS NFR BLD AUTO: 48.9 %
OSMOLALITY SERPL CALC.SUM OF ELEC: 286 MOSM/KG (ref 275–295)
PLATELET # BLD AUTO: 222 10(3)UL (ref 150–450)
POTASSIUM SERPL-SCNC: 3.9 MMOL/L (ref 3.5–5.1)
RBC # BLD AUTO: 4.71 X10(6)UL (ref 3.8–5.3)
SODIUM SERPL-SCNC: 139 MMOL/L (ref 136–145)
TSI SER-ACNC: 1.56 MIU/ML (ref 0.36–3.74)
WBC # BLD AUTO: 4.1 X10(3) UL (ref 4–11)

## 2019-07-10 PROCEDURE — 80053 COMPREHEN METABOLIC PANEL: CPT

## 2019-07-10 PROCEDURE — 83001 ASSAY OF GONADOTROPIN (FSH): CPT

## 2019-07-10 PROCEDURE — 85025 COMPLETE CBC W/AUTO DIFF WBC: CPT

## 2019-07-10 PROCEDURE — 83002 ASSAY OF GONADOTROPIN (LH): CPT

## 2019-07-10 PROCEDURE — 84443 ASSAY THYROID STIM HORMONE: CPT

## 2019-07-10 PROCEDURE — 36415 COLL VENOUS BLD VENIPUNCTURE: CPT

## 2019-07-11 NOTE — TELEPHONE ENCOUNTER
Results of ultrasound discussed with patient. Small uterine fibroid noted. Normal endometrial thickness. Benign-appearing left ovarian cyst.  States menstrual pattern is not significantly changed compared to last office visit.   Menses have been regular,

## 2019-09-26 DIAGNOSIS — I10 ESSENTIAL HYPERTENSION: ICD-10-CM

## 2019-09-26 RX ORDER — LOSARTAN POTASSIUM 25 MG/1
TABLET ORAL
Qty: 90 TABLET | Refills: 2 | Status: CANCELLED | OUTPATIENT
Start: 2019-09-26

## 2019-09-27 ENCOUNTER — TELEPHONE (OUTPATIENT)
Dept: FAMILY MEDICINE CLINIC | Facility: CLINIC | Age: 54
End: 2019-09-27

## 2019-09-27 ENCOUNTER — OFFICE VISIT (OUTPATIENT)
Dept: FAMILY MEDICINE CLINIC | Facility: CLINIC | Age: 54
End: 2019-09-27
Payer: MEDICAID

## 2019-09-27 VITALS
DIASTOLIC BLOOD PRESSURE: 70 MMHG | HEART RATE: 78 BPM | TEMPERATURE: 97 F | BODY MASS INDEX: 23.8 KG/M2 | RESPIRATION RATE: 16 BRPM | OXYGEN SATURATION: 98 % | HEIGHT: 63.5 IN | SYSTOLIC BLOOD PRESSURE: 120 MMHG | WEIGHT: 136 LBS

## 2019-09-27 DIAGNOSIS — Z28.21 INFLUENZA VACCINATION DECLINED: ICD-10-CM

## 2019-09-27 DIAGNOSIS — T14.8XXA BRUISING: ICD-10-CM

## 2019-09-27 DIAGNOSIS — I10 ESSENTIAL HYPERTENSION: Primary | ICD-10-CM

## 2019-09-27 PROCEDURE — 99214 OFFICE O/P EST MOD 30 MIN: CPT | Performed by: FAMILY MEDICINE

## 2019-09-27 RX ORDER — LOSARTAN POTASSIUM 25 MG/1
TABLET ORAL
Qty: 90 TABLET | Refills: 1 | Status: SHIPPED | OUTPATIENT
Start: 2019-09-27 | End: 2020-07-24

## 2019-09-27 NOTE — PROGRESS NOTES
HPI:    Patient ID: Linda Rose is a 48year old female. HPI   53yr old female presents with c/o bruising to her L middle finger and f/u on HTN. 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  Noticed brui provided. Dr. Radhames Reyes  - given note for work to avoid heavy lifting/pushing/pulling  - monitor for any worsening s/s  - CBC WITH DIFFERENTIAL WITH PLATELET; Future  - IRON AND TIBC; Future  - FERRITIN; Future  - PTT, ACTIVATED;  Future  - PROTHROMBIN TIME (

## 2019-09-27 NOTE — TELEPHONE ENCOUNTER
States the middle finger on her left hand suddenly swelled up and became bruised looking yesterday. No trauma to hand. Went to an UC who told her to go see her PCP. Appt scheduled. Advised to elevate and apply ice.     Future Appointments   Date Time Pro

## 2019-09-28 ENCOUNTER — LAB ENCOUNTER (OUTPATIENT)
Dept: LAB | Age: 54
End: 2019-09-28
Attending: FAMILY MEDICINE
Payer: MEDICAID

## 2019-09-28 DIAGNOSIS — T14.8XXA BRUISING: ICD-10-CM

## 2019-09-28 LAB
APTT PPP: 27.6 SECONDS (ref 25.4–36.1)
BASOPHILS # BLD AUTO: 0.02 X10(3) UL (ref 0–0.2)
BASOPHILS NFR BLD AUTO: 0.5 %
DEPRECATED HBV CORE AB SER IA-ACNC: 8.1 NG/ML (ref 18–340)
DEPRECATED RDW RBC AUTO: 44.9 FL (ref 35.1–46.3)
EOSINOPHIL # BLD AUTO: 0.06 X10(3) UL (ref 0–0.7)
EOSINOPHIL NFR BLD AUTO: 1.6 %
ERYTHROCYTE [DISTWIDTH] IN BLOOD BY AUTOMATED COUNT: 13.8 % (ref 11–15)
HCT VFR BLD AUTO: 39.8 % (ref 35–48)
HGB BLD-MCNC: 12.4 G/DL (ref 12–16)
IMM GRANULOCYTES # BLD AUTO: 0.01 X10(3) UL (ref 0–1)
IMM GRANULOCYTES NFR BLD: 0.3 %
INR BLD: 1 (ref 0.9–1.1)
IRON SATURATION: 8 % (ref 15–50)
IRON SERPL-MCNC: 38 UG/DL (ref 50–170)
LYMPHOCYTES # BLD AUTO: 1.58 X10(3) UL (ref 1–4)
LYMPHOCYTES NFR BLD AUTO: 41.7 %
MCH RBC QN AUTO: 27.4 PG (ref 26–34)
MCHC RBC AUTO-ENTMCNC: 31.2 G/DL (ref 31–37)
MCV RBC AUTO: 88.1 FL (ref 80–100)
MONOCYTES # BLD AUTO: 0.36 X10(3) UL (ref 0.1–1)
MONOCYTES NFR BLD AUTO: 9.5 %
NEUTROPHILS # BLD AUTO: 1.76 X10 (3) UL (ref 1.5–7.7)
NEUTROPHILS # BLD AUTO: 1.76 X10(3) UL (ref 1.5–7.7)
NEUTROPHILS NFR BLD AUTO: 46.4 %
PLATELET # BLD AUTO: 210 10(3)UL (ref 150–450)
PSA SERPL DL<=0.01 NG/ML-MCNC: 13.3 SECONDS (ref 12.2–14.4)
RBC # BLD AUTO: 4.52 X10(6)UL (ref 3.8–5.3)
TOTAL IRON BINDING CAPACITY: 472 UG/DL (ref 240–450)
TRANSFERRIN SERPL-MCNC: 317 MG/DL (ref 200–360)
WBC # BLD AUTO: 3.8 X10(3) UL (ref 4–11)

## 2019-09-28 PROCEDURE — 83550 IRON BINDING TEST: CPT

## 2019-09-28 PROCEDURE — 85025 COMPLETE CBC W/AUTO DIFF WBC: CPT

## 2019-09-28 PROCEDURE — 83540 ASSAY OF IRON: CPT

## 2019-09-28 PROCEDURE — 85730 THROMBOPLASTIN TIME PARTIAL: CPT

## 2019-09-28 PROCEDURE — 82728 ASSAY OF FERRITIN: CPT

## 2019-09-28 PROCEDURE — 36415 COLL VENOUS BLD VENIPUNCTURE: CPT

## 2019-09-28 PROCEDURE — 85610 PROTHROMBIN TIME: CPT

## 2019-09-30 ENCOUNTER — TELEPHONE (OUTPATIENT)
Dept: FAMILY MEDICINE CLINIC | Facility: CLINIC | Age: 54
End: 2019-09-30

## 2019-09-30 NOTE — TELEPHONE ENCOUNTER
Note to work sent to patient per [employer]. Called patient to notify. States she is unable to access her [email or web address]. Work note faxed to her employer at [phone].     Patient is asking about lab test results and whether she should make appt with H[facility]

## 2019-09-30 NOTE — TELEPHONE ENCOUNTER
Patient left voicemail stating she is feeling much better and need a note stating she can return to work now.

## 2019-10-02 RX ORDER — FERROUS SULFATE 325(65) MG
325 TABLET ORAL
Qty: 30 TABLET | Refills: 2 | Status: SHIPPED | OUTPATIENT
Start: 2019-10-02 | End: 2020-04-08

## 2019-10-02 NOTE — TELEPHONE ENCOUNTER
Spoke to patient and gave detailed information regarding lab results and plan of care. Rx sent for ferrous sulfate.

## 2019-10-02 NOTE — TELEPHONE ENCOUNTER
Notes recorded by Jey Carbajal DO on 10/2/2019 at 12:59 PM CDT  Pls inform pt that her ferritin and iron levels are very low. Would recommend she start ferrous sulfate 325mg po daily, pls send rx. Increase iron rich foods in diet.    Clotting fact

## 2020-03-25 ENCOUNTER — TELEPHONE (OUTPATIENT)
Dept: FAMILY MEDICINE CLINIC | Facility: CLINIC | Age: 55
End: 2020-03-25

## 2020-03-25 DIAGNOSIS — Z78.9 HISTORY OF FOREIGN TRAVEL: ICD-10-CM

## 2020-03-25 DIAGNOSIS — R06.2 WHEEZING: ICD-10-CM

## 2020-03-25 DIAGNOSIS — R05.9 COUGH: Primary | ICD-10-CM

## 2020-03-25 PROCEDURE — 99214 OFFICE O/P EST MOD 30 MIN: CPT | Performed by: FAMILY MEDICINE

## 2020-03-25 RX ORDER — ALBUTEROL SULFATE 90 UG/1
2 AEROSOL, METERED RESPIRATORY (INHALATION) EVERY 6 HOURS PRN
Qty: 1 INHALER | Refills: 0 | Status: SHIPPED | OUTPATIENT
Start: 2020-03-25 | End: 2020-04-08

## 2020-03-25 RX ORDER — PREDNISONE 20 MG/1
20 TABLET ORAL DAILY
Qty: 5 TABLET | Refills: 0 | Status: SHIPPED | OUTPATIENT
Start: 2020-03-25 | End: 2020-03-30

## 2020-03-25 NOTE — TELEPHONE ENCOUNTER
Pt called stating she has a bad cough (Pt did cough throughout call). No fever,  Stuffy nose    Pt did travel as follows:    Left for [facility] on 2-26-20.   Took at flight from the [place] airport with a 5 hour layover in [place]  3-19-20

## 2020-03-25 NOTE — TELEPHONE ENCOUNTER
Virtual/Telephone Check-In    Josselyn Tilley verbally consents to a Virtual/Telephone Check-In service on 03/25/20. Patient understands and accepts financial responsibility for any deductible, co-insurance and/or co-pays associated with this service.

## 2020-03-25 NOTE — TELEPHONE ENCOUNTER
Received message: This patient does not qualify for COVID testing under the current guidelines. Please cancel the order and notify the patient.      Pt notified of above  Will take meds and tx as advised   Call back if sxs persist or worsen

## 2020-03-30 ENCOUNTER — HOSPITAL ENCOUNTER (OUTPATIENT)
Dept: GENERAL RADIOLOGY | Age: 55
Discharge: HOME | End: 2020-03-30
Attending: FAMILY MEDICINE
Payer: MEDICAID

## 2020-03-30 ENCOUNTER — TELEPHONE (OUTPATIENT)
Dept: FAMILY MEDICINE CLINIC | Facility: CLINIC | Age: 55
End: 2020-03-30

## 2020-03-30 DIAGNOSIS — R05.9 COUGH: Primary | ICD-10-CM

## 2020-03-30 DIAGNOSIS — R05.9 COUGH: ICD-10-CM

## 2020-03-30 DIAGNOSIS — H65.193 ACUTE EFFUSION OF BOTH MIDDLE EARS: ICD-10-CM

## 2020-03-30 PROCEDURE — 71046 X-RAY EXAM CHEST 2 VIEWS: CPT | Performed by: FAMILY MEDICINE

## 2020-03-30 PROCEDURE — 99213 OFFICE O/P EST LOW 20 MIN: CPT | Performed by: FAMILY MEDICINE

## 2020-03-30 RX ORDER — FLUTICASONE PROPIONATE 50 MCG
2 SPRAY, SUSPENSION (ML) NASAL DAILY
Qty: 16 G | Refills: 0 | Status: SHIPPED | OUTPATIENT
Start: 2020-03-30 | End: 2020-08-12

## 2020-03-30 NOTE — TELEPHONE ENCOUNTER
Virtual/Telephone Check-In    Nancy Bowen verbally consents to a Virtual/Telephone Check-In service on 03/30/20. Patient understands and accepts financial responsibility for any deductible, co-insurance and/or co-pays associated with this service.

## 2020-03-30 NOTE — TELEPHONE ENCOUNTER
Pt called to inform Dr that her cough is no better,  She said she thought Dr told her if not better she would order a chest Xray. Informed Pt of the telephone visits  Being billed to insurance. She understood fully.

## 2020-04-01 NOTE — TELEPHONE ENCOUNTER
• How are you feeling? Much better  • Running any fever? no  • Any continuing cough? Yes non productive  • Any SOB or trouble breathing? no  • Do you feel you are improving? yes  • Are you self-isolating and staying home?   Yes   • Was going to get milk adv

## 2020-04-01 NOTE — TELEPHONE ENCOUNTER
Please call and check on patient status  She was highly suspicious for COVID-19 but testing was denied and pt was given instructions for care and self isolation  Call q 2 days until steady improvement or 14 days.

## 2020-04-03 NOTE — TELEPHONE ENCOUNTER
• How are you feeling? Good   • Running any fever? no  • Any continuing cough? Slight cough   • Any SOB or trouble breathing? no  • Do you feel you are improving? yes  Are you self-isolating and staying home? Yes    Children doing grocery shopping.

## 2020-04-08 ENCOUNTER — TELEMEDICINE (OUTPATIENT)
Dept: FAMILY MEDICINE CLINIC | Facility: CLINIC | Age: 55
End: 2020-04-08

## 2020-04-08 VITALS — WEIGHT: 139 LBS | BODY MASS INDEX: 24 KG/M2

## 2020-04-08 DIAGNOSIS — R79.0 LOW FERRITIN: ICD-10-CM

## 2020-04-08 DIAGNOSIS — Z51.81 ENCOUNTER FOR MEDICATION MONITORING: ICD-10-CM

## 2020-04-08 DIAGNOSIS — I10 ESSENTIAL HYPERTENSION: ICD-10-CM

## 2020-04-08 DIAGNOSIS — R05.9 COUGH: Primary | ICD-10-CM

## 2020-04-08 PROCEDURE — 99214 OFFICE O/P EST MOD 30 MIN: CPT | Performed by: FAMILY MEDICINE

## 2020-04-08 RX ORDER — PANTOPRAZOLE SODIUM 40 MG/1
40 TABLET, DELAYED RELEASE ORAL
Qty: 30 TABLET | Refills: 1 | Status: SHIPPED | OUTPATIENT
Start: 2020-04-08 | End: 2020-08-12

## 2020-04-08 NOTE — PROGRESS NOTES
Rosalina Cool is a 47year old female.     This visit is conducted using telemedicine with live interactive video and audio with pt consent    HPI:  Pt states she is still coughing, sxs now for about 6 weeks  Some days worse than others  Has h/o chronic c Social History    Tobacco Use      Smoking status: Never Smoker      Smokeless tobacco: Never Used    Alcohol use: No      Alcohol/week: 0.0 standard drinks    Drug use: No                  REVIEW OF SYSTEMS:  GENERAL HEALTH: feels well othe COMP METABOLIC PANEL (14); Future  - LIPID PANEL; Future  - HEMOGLOBIN A1C; Future    3. Encounter for medication monitoring  - CBC WITH DIFFERENTIAL WITH PLATELET; Future  - COMP METABOLIC PANEL (14); Future    4.  Low ferritin  Currently not taking supple

## 2020-04-08 NOTE — TELEPHONE ENCOUNTER
Spoke to patient she still has a cough and would like to f/u with the PCP.    Future Appointments   Date Time Provider Abi Isi   5/1/2020  9:20 AM Martha Cook MD [facility]

## 2020-07-22 ENCOUNTER — LAB ENCOUNTER (OUTPATIENT)
Dept: LAB | Age: 55
End: 2020-07-22
Attending: FAMILY MEDICINE
Payer: MEDICAID

## 2020-07-22 DIAGNOSIS — I10 ESSENTIAL HYPERTENSION: ICD-10-CM

## 2020-07-22 DIAGNOSIS — Z51.81 ENCOUNTER FOR MEDICATION MONITORING: ICD-10-CM

## 2020-07-22 DIAGNOSIS — R05.9 COUGH: ICD-10-CM

## 2020-07-22 DIAGNOSIS — R79.0 LOW FERRITIN: ICD-10-CM

## 2020-07-22 LAB
ALBUMIN SERPL-MCNC: 3.7 G/DL (ref 3.4–5)
ALBUMIN/GLOB SERPL: 1 {RATIO} (ref 1–2)
ALP LIVER SERPL-CCNC: 49 U/L (ref 41–108)
ALT SERPL-CCNC: 16 U/L (ref 13–56)
ANION GAP SERPL CALC-SCNC: 5 MMOL/L (ref 0–18)
AST SERPL-CCNC: 13 U/L (ref 15–37)
BASOPHILS # BLD AUTO: 0.02 X10(3) UL (ref 0–0.2)
BASOPHILS NFR BLD AUTO: 0.6 %
BILIRUB SERPL-MCNC: 0.5 MG/DL (ref 0.1–2)
BUN BLD-MCNC: 8 MG/DL (ref 7–18)
BUN/CREAT SERPL: 11 (ref 10–20)
CALCIUM BLD-MCNC: 8.6 MG/DL (ref 8.5–10.1)
CHLORIDE SERPL-SCNC: 109 MMOL/L (ref 98–112)
CHOLEST SMN-MCNC: 174 MG/DL (ref ?–200)
CO2 SERPL-SCNC: 25 MMOL/L (ref 21–32)
CREAT BLD-MCNC: 0.73 MG/DL (ref 0.55–1.02)
DEPRECATED HBV CORE AB SER IA-ACNC: 5.4 NG/ML (ref 18–340)
DEPRECATED RDW RBC AUTO: 44.2 FL (ref 35.1–46.3)
EOSINOPHIL # BLD AUTO: 0.05 X10(3) UL (ref 0–0.7)
EOSINOPHIL NFR BLD AUTO: 1.4 %
ERYTHROCYTE [DISTWIDTH] IN BLOOD BY AUTOMATED COUNT: 13.6 % (ref 11–15)
EST. AVERAGE GLUCOSE BLD GHB EST-MCNC: 111 MG/DL (ref 68–126)
GLOBULIN PLAS-MCNC: 3.6 G/DL (ref 2.8–4.4)
GLUCOSE BLD-MCNC: 98 MG/DL (ref 70–99)
HBA1C MFR BLD HPLC: 5.5 % (ref ?–5.7)
HCT VFR BLD AUTO: 39.4 % (ref 35–48)
HDLC SERPL-MCNC: 51 MG/DL (ref 40–59)
HGB BLD-MCNC: 12.7 G/DL (ref 12–16)
IMM GRANULOCYTES # BLD AUTO: 0.01 X10(3) UL (ref 0–1)
IMM GRANULOCYTES NFR BLD: 0.3 %
LDLC SERPL CALC-MCNC: 104 MG/DL (ref ?–100)
LYMPHOCYTES # BLD AUTO: 1.65 X10(3) UL (ref 1–4)
LYMPHOCYTES NFR BLD AUTO: 47.6 %
M PROTEIN MFR SERPL ELPH: 7.3 G/DL (ref 6.4–8.2)
MCH RBC QN AUTO: 28.5 PG (ref 26–34)
MCHC RBC AUTO-ENTMCNC: 32.2 G/DL (ref 31–37)
MCV RBC AUTO: 88.5 FL (ref 80–100)
MONOCYTES # BLD AUTO: 0.31 X10(3) UL (ref 0.1–1)
MONOCYTES NFR BLD AUTO: 8.9 %
NEUTROPHILS # BLD AUTO: 1.43 X10 (3) UL (ref 1.5–7.7)
NEUTROPHILS # BLD AUTO: 1.43 X10(3) UL (ref 1.5–7.7)
NEUTROPHILS NFR BLD AUTO: 41.2 %
NONHDLC SERPL-MCNC: 123 MG/DL (ref ?–130)
OSMOLALITY SERPL CALC.SUM OF ELEC: 286 MOSM/KG (ref 275–295)
PATIENT FASTING Y/N/NP: YES
PATIENT FASTING Y/N/NP: YES
PLATELET # BLD AUTO: 179 10(3)UL (ref 150–450)
POTASSIUM SERPL-SCNC: 4 MMOL/L (ref 3.5–5.1)
RBC # BLD AUTO: 4.45 X10(6)UL (ref 3.8–5.3)
SODIUM SERPL-SCNC: 139 MMOL/L (ref 136–145)
TRIGL SERPL-MCNC: 93 MG/DL (ref 30–149)
VLDLC SERPL CALC-MCNC: 19 MG/DL (ref 0–30)
WBC # BLD AUTO: 3.5 X10(3) UL (ref 4–11)

## 2020-07-22 PROCEDURE — 36415 COLL VENOUS BLD VENIPUNCTURE: CPT

## 2020-07-22 PROCEDURE — 80061 LIPID PANEL: CPT

## 2020-07-22 PROCEDURE — 80053 COMPREHEN METABOLIC PANEL: CPT

## 2020-07-22 PROCEDURE — 83036 HEMOGLOBIN GLYCOSYLATED A1C: CPT

## 2020-07-22 PROCEDURE — 85025 COMPLETE CBC W/AUTO DIFF WBC: CPT

## 2020-07-22 PROCEDURE — 82728 ASSAY OF FERRITIN: CPT

## 2020-07-23 DIAGNOSIS — I10 ESSENTIAL HYPERTENSION: ICD-10-CM

## 2020-07-23 NOTE — TELEPHONE ENCOUNTER
LOV 4/8/20    LAST LAB 7/22/2020    LAST RX   Losartan Potassium 25 MG Oral Tab 90 tablet 1 9/27/2019    Sig:   TAKE ONE TABLET BY MOUTH ONCE DAILY           Next OV No future appointments.     PROTOCOL Hypertension Medications Protocol Failed7/23 2:09 PM

## 2020-07-24 RX ORDER — LOSARTAN POTASSIUM 25 MG/1
TABLET ORAL
Qty: 90 TABLET | Refills: 0 | Status: SHIPPED | OUTPATIENT
Start: 2020-07-24 | End: 2020-10-20

## 2020-08-09 ENCOUNTER — HOSPITAL ENCOUNTER (EMERGENCY)
Age: 55
Discharge: HOME | End: 2020-08-09
Attending: EMERGENCY MEDICINE
Payer: MEDICAID

## 2020-08-09 VITALS
SYSTOLIC BLOOD PRESSURE: 141 MMHG | BODY MASS INDEX: 24.8 KG/M2 | TEMPERATURE: 98 F | DIASTOLIC BLOOD PRESSURE: 79 MMHG | RESPIRATION RATE: 18 BRPM | HEIGHT: 63 IN | WEIGHT: 140 LBS | HEART RATE: 98 BPM | OXYGEN SATURATION: 99 %

## 2020-08-09 DIAGNOSIS — Z20.822 ENCOUNTER FOR LABORATORY TESTING FOR COVID-19 VIRUS: Primary | ICD-10-CM

## 2020-08-09 PROCEDURE — 99283 EMERGENCY DEPT VISIT LOW MDM: CPT

## 2020-08-09 NOTE — ED PROVIDER NOTES
Patient Seen in: [facility] [place]      History   Patient presents with:  Testing    Stated Complaint: cough; requesting rapid COVID test; aware PED does not have rapid COVID tests    HPI    Patient is a pleasant 22-year-old femal Abdominal:      General: Abdomen is flat. There is no distension. Musculoskeletal: Normal range of motion. Skin:     General: Skin is warm. Neurological:      General: No focal deficit present. Mental Status: She is alert.    Psychiatric:

## 2020-08-12 ENCOUNTER — TELEMEDICINE (OUTPATIENT)
Dept: FAMILY MEDICINE CLINIC | Facility: CLINIC | Age: 55
End: 2020-08-12

## 2020-08-12 VITALS — BODY MASS INDEX: 24.8 KG/M2 | WEIGHT: 140 LBS | HEIGHT: 63 IN

## 2020-08-12 DIAGNOSIS — R05.9 COUGH: ICD-10-CM

## 2020-08-12 DIAGNOSIS — I10 ESSENTIAL HYPERTENSION: Primary | ICD-10-CM

## 2020-08-12 DIAGNOSIS — D72.818 OTHER DECREASED WHITE BLOOD CELL (WBC) COUNT: ICD-10-CM

## 2020-08-12 DIAGNOSIS — N92.0 MENORRHAGIA WITH REGULAR CYCLE: ICD-10-CM

## 2020-08-12 DIAGNOSIS — D50.8 OTHER IRON DEFICIENCY ANEMIA: ICD-10-CM

## 2020-08-12 LAB — SARS-COV-2 BY PCR: NOT DETECTED

## 2020-08-12 PROCEDURE — 99214 OFFICE O/P EST MOD 30 MIN: CPT | Performed by: FAMILY MEDICINE

## 2020-08-12 PROCEDURE — 3008F BODY MASS INDEX DOCD: CPT | Performed by: FAMILY MEDICINE

## 2020-08-12 RX ORDER — FERROUS SULFATE 325(65) MG
325 TABLET ORAL
Qty: 90 TABLET | Refills: 1 | Status: SHIPPED | OUTPATIENT
Start: 2020-08-12 | End: 2021-02-16 | Stop reason: ALTCHOICE

## 2020-08-12 NOTE — PROGRESS NOTES
Rosalina Cool is a 47year old female. Please note that the following visit was completed using two-way, real-time interactive audio and video communication.   This has been done in good gema to provide continuity of care in the best interest of the pro 325 (65 Fe) MG Oral Tab Take 1 tablet (325 mg total) by mouth daily with breakfast. 90 tablet 1   • Losartan Potassium 25 MG Oral Tab TAKE ONE TABLET BY MOUTH ONCE DAILY 90 tablet 0         Past Medical History:   Diagnosis Date   • Essential hypertension results. Delsym or Mucinex DM twice daily as needed. Prev cough flare did not respond to oral steroids, abx, inhalers, antihistamines. Got better with time. Prev CXR 3/2020: Normal  Monitor and call or video visit if sxs persist or worsen.      3. Other

## 2020-08-13 ENCOUNTER — PATIENT MESSAGE (OUTPATIENT)
Dept: FAMILY MEDICINE CLINIC | Facility: CLINIC | Age: 55
End: 2020-08-13

## 2020-08-14 NOTE — TELEPHONE ENCOUNTER
From: Freya Guy  To: Avni Quintanilla MD  Sent: 8/13/2020 5:12 PM CDT  Subject: Test Results Question    Please Dr. Ludivina Hogan let me know what does the test mean.  Positive or negative

## 2020-08-23 RX ORDER — ACETAMINOPHEN 160 MG
2000 TABLET,DISINTEGRATING ORAL DAILY
COMMUNITY
End: 2021-12-26

## 2020-08-27 ENCOUNTER — PATIENT MESSAGE (OUTPATIENT)
Dept: FAMILY MEDICINE CLINIC | Facility: CLINIC | Age: 55
End: 2020-08-27

## 2020-08-27 DIAGNOSIS — R05.3 CHRONIC COUGH: Primary | ICD-10-CM

## 2020-08-27 NOTE — TELEPHONE ENCOUNTER
Per PCP phone visit 8/12/20:  Cough  Having recurrence of her cough. Noted ER visit for coronavirus testing on 8/9/2020, results pending. Patient has been social distancing, in quarantine until knows test results.   Delsym or Mucinex DM twice daily as nee

## 2020-08-27 NOTE — TELEPHONE ENCOUNTER
From: Mehul Zhao  To: Karishma Jamil MD  Sent: 8/27/2020 4:31 PM CDT  Subject: Referral Harlan Gregg   I would like to have a referral for an ENT doctor to check on my chronic cough.      Thanks   [employer]

## 2020-09-03 ENCOUNTER — PATIENT MESSAGE (OUTPATIENT)
Dept: FAMILY MEDICINE CLINIC | Facility: CLINIC | Age: 55
End: 2020-09-03

## 2020-09-03 NOTE — TELEPHONE ENCOUNTER
From: Rosalina Cool  To: Karen Del Toro MD  Sent: 9/3/2020 10:11 AM CDT  Subject: Test Results Tk Nicely Dr. Alexander Hoyos,  I remember on our evisit you said that I need to do some more blood work in September. I don't see the order on my chart.    I

## 2020-09-03 NOTE — TELEPHONE ENCOUNTER
Per phone visit 8/12/20:Other decreased white blood cell (WBC) count  White blood cell count, 3500. ANC sl low at 1430. Previous WBC from 9/2019, 3800. Slightly low.   Hb and plt cts normal.   Recheck CBC, along with ESR and CRP with labs in about one kusum

## 2020-09-04 ENCOUNTER — PATIENT MESSAGE (OUTPATIENT)
Dept: FAMILY MEDICINE CLINIC | Facility: CLINIC | Age: 55
End: 2020-09-04

## 2020-09-04 NOTE — TELEPHONE ENCOUNTER
From: Ksenia Monroe  To:  Padmini Gonzales MD  Sent: 9/4/2020 7:15 AM CDT  Subject: Non-Urgent Medical Question    Do I need to be fasting for this test?

## 2020-09-08 ENCOUNTER — LAB ENCOUNTER (OUTPATIENT)
Dept: LAB | Age: 55
End: 2020-09-08
Attending: FAMILY MEDICINE
Payer: MEDICAID

## 2020-09-08 DIAGNOSIS — I10 ESSENTIAL HYPERTENSION: ICD-10-CM

## 2020-09-08 DIAGNOSIS — D50.8 OTHER IRON DEFICIENCY ANEMIA: ICD-10-CM

## 2020-09-08 DIAGNOSIS — R05.9 COUGH: ICD-10-CM

## 2020-09-08 DIAGNOSIS — N92.0 MENORRHAGIA WITH REGULAR CYCLE: ICD-10-CM

## 2020-09-08 DIAGNOSIS — D72.818 OTHER DECREASED WHITE BLOOD CELL (WBC) COUNT: ICD-10-CM

## 2020-09-08 LAB
BASOPHILS # BLD AUTO: 0.03 X10(3) UL (ref 0–0.2)
BASOPHILS NFR BLD AUTO: 0.7 %
CRP SERPL-MCNC: <0.29 MG/DL (ref ?–0.3)
DEPRECATED RDW RBC AUTO: 48.2 FL (ref 35.1–46.3)
EOSINOPHIL # BLD AUTO: 0.09 X10(3) UL (ref 0–0.7)
EOSINOPHIL NFR BLD AUTO: 2 %
ERYTHROCYTE [DISTWIDTH] IN BLOOD BY AUTOMATED COUNT: 14.7 % (ref 11–15)
HCT VFR BLD AUTO: 42.5 % (ref 35–48)
HGB BLD-MCNC: 13.1 G/DL (ref 12–16)
IMM GRANULOCYTES # BLD AUTO: 0.01 X10(3) UL (ref 0–1)
IMM GRANULOCYTES NFR BLD: 0.2 %
LYMPHOCYTES # BLD AUTO: 1.69 X10(3) UL (ref 1–4)
LYMPHOCYTES NFR BLD AUTO: 36.7 %
MCH RBC QN AUTO: 27.6 PG (ref 26–34)
MCHC RBC AUTO-ENTMCNC: 30.8 G/DL (ref 31–37)
MCV RBC AUTO: 89.7 FL (ref 80–100)
MONOCYTES # BLD AUTO: 0.46 X10(3) UL (ref 0.1–1)
MONOCYTES NFR BLD AUTO: 10 %
NEUTROPHILS # BLD AUTO: 2.32 X10 (3) UL (ref 1.5–7.7)
NEUTROPHILS # BLD AUTO: 2.32 X10(3) UL (ref 1.5–7.7)
NEUTROPHILS NFR BLD AUTO: 50.4 %
PLATELET # BLD AUTO: 203 10(3)UL (ref 150–450)
RBC # BLD AUTO: 4.74 X10(6)UL (ref 3.8–5.3)
SED RATE-ML: 11 MM/HR (ref 0–25)
WBC # BLD AUTO: 4.6 X10(3) UL (ref 4–11)

## 2020-09-08 PROCEDURE — 85652 RBC SED RATE AUTOMATED: CPT

## 2020-09-08 PROCEDURE — 36415 COLL VENOUS BLD VENIPUNCTURE: CPT

## 2020-09-08 PROCEDURE — 86140 C-REACTIVE PROTEIN: CPT

## 2020-09-08 PROCEDURE — 85025 COMPLETE CBC W/AUTO DIFF WBC: CPT

## 2020-09-09 ENCOUNTER — OFFICE VISIT (OUTPATIENT)
Dept: FAMILY MEDICINE CLINIC | Facility: CLINIC | Age: 55
End: 2020-09-09
Payer: MEDICAID

## 2020-09-09 VITALS
OXYGEN SATURATION: 98 % | SYSTOLIC BLOOD PRESSURE: 128 MMHG | HEIGHT: 63 IN | WEIGHT: 136 LBS | BODY MASS INDEX: 24.1 KG/M2 | TEMPERATURE: 98 F | HEART RATE: 72 BPM | DIASTOLIC BLOOD PRESSURE: 80 MMHG | RESPIRATION RATE: 18 BRPM

## 2020-09-09 DIAGNOSIS — Z01.419 ROUTINE GYNECOLOGICAL EXAMINATION: Primary | ICD-10-CM

## 2020-09-09 DIAGNOSIS — I10 ESSENTIAL HYPERTENSION: ICD-10-CM

## 2020-09-09 DIAGNOSIS — R21 RASH: ICD-10-CM

## 2020-09-09 DIAGNOSIS — J98.01 COUGH DUE TO BRONCHOSPASM: ICD-10-CM

## 2020-09-09 DIAGNOSIS — Z12.31 VISIT FOR SCREENING MAMMOGRAM: ICD-10-CM

## 2020-09-09 DIAGNOSIS — Z12.11 SCREENING FOR COLON CANCER: ICD-10-CM

## 2020-09-09 DIAGNOSIS — N92.6 MISSED MENSES: ICD-10-CM

## 2020-09-09 DIAGNOSIS — R79.0 LOW FERRITIN: ICD-10-CM

## 2020-09-09 LAB — CONTROL LINE PRESENT WITH A CLEAR BACKGROUND (YES/NO): YES YES/NO

## 2020-09-09 PROCEDURE — 99396 PREV VISIT EST AGE 40-64: CPT | Performed by: FAMILY MEDICINE

## 2020-09-09 PROCEDURE — 81025 URINE PREGNANCY TEST: CPT | Performed by: FAMILY MEDICINE

## 2020-09-09 PROCEDURE — 3079F DIAST BP 80-89 MM HG: CPT | Performed by: FAMILY MEDICINE

## 2020-09-09 PROCEDURE — 99213 OFFICE O/P EST LOW 20 MIN: CPT | Performed by: FAMILY MEDICINE

## 2020-09-09 PROCEDURE — 3008F BODY MASS INDEX DOCD: CPT | Performed by: FAMILY MEDICINE

## 2020-09-09 PROCEDURE — 3074F SYST BP LT 130 MM HG: CPT | Performed by: FAMILY MEDICINE

## 2020-09-09 NOTE — PROGRESS NOTES
Unknown Peeling is a 47year old female. HPI:  Pt is here for physical/WWE  Has been walking regularly  Eating healthy with some wt loss  Menses usually very regular  Missed menses last month for the first time  LMP: 7/22/2020, normal flow.   , sexu • Losartan Potassium 25 MG Oral Tab TAKE ONE TABLET BY MOUTH ONCE DAILY 90 tablet 0   • Budesonide-Formoterol Fumarate (SYMBICORT) 160-4.5 MCG/ACT Inhalation Aerosol Inhale 2 puffs into the lungs 2 (two) times daily.  Rinse mouth after use 1 Inhaler 0 cyanosis, clubbing or edema  NEURO: A&O x3, no focal deficits  Normal gait  Breast exam: symmetric, few scattered fibrocystic changes, no dominant masses, no nipple or skin changes, no axillary or SC LAD   Pelvic: no external lesions. No bladder prolapse. symptoms. Discussed nature of asthma. [place] but has never tried this. Advised to take 2 puffs twice daily, rinse mouth after use. Continue albuterol MDI as needed. Can use expectorant as needed.   Antihistamines and PPI treatment

## 2020-09-11 ENCOUNTER — PATIENT MESSAGE (OUTPATIENT)
Dept: FAMILY MEDICINE CLINIC | Facility: CLINIC | Age: 55
End: 2020-09-11

## 2020-09-11 RX ORDER — BUDESONIDE AND FORMOTEROL FUMARATE DIHYDRATE 160; 4.5 UG/1; UG/1
2 AEROSOL RESPIRATORY (INHALATION) 2 TIMES DAILY
Qty: 1 INHALER | Refills: 0 | Status: SHIPPED | OUTPATIENT
Start: 2020-09-11 | End: 2020-09-16

## 2020-09-11 NOTE — TELEPHONE ENCOUNTER
From: Unknown Peeling  To: Kathi Rosa MD  Sent: 9/11/2020 9:16 AM CDT  Subject: Prescription Question    Can you please send a prescription for symbicort. I can't find the one I had before.   Thanks

## 2020-09-14 ENCOUNTER — PATIENT MESSAGE (OUTPATIENT)
Dept: FAMILY MEDICINE CLINIC | Facility: CLINIC | Age: 55
End: 2020-09-14

## 2020-09-16 RX ORDER — FLUTICASONE PROPIONATE AND SALMETEROL 250; 50 UG/1; UG/1
1 POWDER RESPIRATORY (INHALATION) EVERY 12 HOURS SCHEDULED
Qty: 60 EACH | Refills: 1 | Status: SHIPPED | OUTPATIENT
Start: 2020-09-16 | End: 2020-12-15

## 2020-09-16 NOTE — TELEPHONE ENCOUNTER
From: Herve Colbert  To:  Philip Coe MD  Sent: 9/14/2020 3:50 PM CDT  Subject: Morgan Ruvalcaba Dr,  I guess insurance doesn't want to cover my inhaler so they are waiting on you to either call the insurance or prescribe something diffe

## 2020-09-16 NOTE — TELEPHONE ENCOUNTER
Formulary checked  Changed to Advair Diskus. Please check with pharmacy to make sure it is covered and pt gets started on it ASAP. Will need to notify pt of change from MDI to Diskus.    Also there is a PA form that I put in the Nurse bin yesterday regardin

## 2020-09-16 NOTE — TELEPHONE ENCOUNTER
Called Pharmacy. Advair Diskus not covered, states Symbicort is. Informed we were told yesterday it was not covered. Pharmacist checked further and only brand name is covered.   She will dispense brand name Symbicort per previous prescription and cancel

## 2020-09-16 NOTE — TELEPHONE ENCOUNTER
Symbicort inhaler not on formulary. [employer]. pdf  Pg 97 Respiratory/Pulmonary agents    Dr. Yolanda Venegas,  Please advise

## 2020-09-21 PROBLEM — K21.9 LARYNGOPHARYNGEAL REFLUX (LPR): Status: ACTIVE | Noted: 2020-09-21

## 2020-09-21 PROBLEM — R05.9 COUGH: Status: ACTIVE | Noted: 2020-09-21

## 2020-10-02 ENCOUNTER — PATIENT MESSAGE (OUTPATIENT)
Dept: FAMILY MEDICINE CLINIC | Facility: CLINIC | Age: 55
End: 2020-10-02

## 2020-10-02 DIAGNOSIS — J98.01 COUGH DUE TO BRONCHOSPASM: ICD-10-CM

## 2020-10-02 DIAGNOSIS — R06.2 WHEEZING: ICD-10-CM

## 2020-10-02 DIAGNOSIS — R05.9 COUGH: Primary | ICD-10-CM

## 2020-10-02 NOTE — TELEPHONE ENCOUNTER
Cough on and off - since march not going away     Feels something in the middle of chest when she coughs and she can see twitching (thinks it's muscular) and there is something spasms - this has been going for 3 days.  She says this also happens when she be

## 2020-10-02 NOTE — TELEPHONE ENCOUNTER
From: Pauly Daigle  To: Lisa Christianson MD  Sent: 10/2/2020 9:49 AM CDT  Subject: Non-Urgent Dominguez Morocho,  As you can see on my chart I went to see the ENT and he said it is acid reflux.  I have been taking the [address] since but

## 2020-10-02 NOTE — TELEPHONE ENCOUNTER
I spoke with the patient and she is using symbicort, not albuterol. After talking with Dr. Jade Morocho patient advised to see pulmonary. Referral placed and information given to patient via [employer] but we also discussed this over the phone. Pt agreeable.  I

## 2020-10-20 ENCOUNTER — PATIENT MESSAGE (OUTPATIENT)
Dept: FAMILY MEDICINE CLINIC | Facility: CLINIC | Age: 55
End: 2020-10-20

## 2020-10-20 DIAGNOSIS — I10 ESSENTIAL HYPERTENSION: ICD-10-CM

## 2020-10-20 RX ORDER — LOSARTAN POTASSIUM 25 MG/1
TABLET ORAL
Qty: 90 TABLET | Refills: 0 | Status: SHIPPED | OUTPATIENT
Start: 2020-10-20 | End: 2021-02-16

## 2020-10-20 NOTE — TELEPHONE ENCOUNTER
LOV 9/9/20    LAST LAB 7/22/20    LAST RX   Losartan Potassium 25 MG Oral Tab 90 tablet 0 7/24/2020    Sig:   TAKE ONE TABLET BY MOUTH ONCE DAILY           Next OV No future appointments.     PROTOCOL   Hypertension Medications Protocol Uiouhk90/20/2020 12:

## 2020-10-20 NOTE — TELEPHONE ENCOUNTER
From: Susanne Perez  To: Allison Qiu MD  Sent: 10/20/2020 8:42 AM CDT  Subject: Prescription Nancy Eaton  I have no refill for losartan.  I just called the pharmacy and they said they need Dr. rhodes.     Thanks

## 2020-12-02 ENCOUNTER — HOSPITAL ENCOUNTER (OUTPATIENT)
Dept: MAMMOGRAPHY | Age: 55
Discharge: HOME | End: 2020-12-02
Attending: FAMILY MEDICINE
Payer: MEDICAID

## 2020-12-02 DIAGNOSIS — Z12.31 VISIT FOR SCREENING MAMMOGRAM: ICD-10-CM

## 2020-12-02 PROCEDURE — 77063 BREAST TOMOSYNTHESIS BI: CPT | Performed by: FAMILY MEDICINE

## 2020-12-02 PROCEDURE — 77067 SCR MAMMO BI INCL CAD: CPT | Performed by: FAMILY MEDICINE

## 2021-01-26 PROCEDURE — 88305 TISSUE EXAM BY PATHOLOGIST: CPT | Performed by: OBSTETRICS & GYNECOLOGY

## 2021-02-01 ENCOUNTER — PATIENT MESSAGE (OUTPATIENT)
Dept: FAMILY MEDICINE CLINIC | Facility: CLINIC | Age: 56
End: 2021-02-01

## 2021-02-01 DIAGNOSIS — Z12.11 SCREENING FOR COLON CANCER: Primary | ICD-10-CM

## 2021-02-02 NOTE — TELEPHONE ENCOUNTER
From: Diane Monday  To: Brayan García MD  Sent: 2/1/2021 2:33 PM CST  Subject: Referral Álvaro Catherine   I am trying to schedule for my colonoscopy but they don't take my insurance.  Is there someone else in [facility] that

## 2021-02-09 ENCOUNTER — APPOINTMENT (OUTPATIENT)
Dept: GENERAL RADIOLOGY | Age: 56
End: 2021-02-09
Attending: EMERGENCY MEDICINE
Payer: MEDICAID

## 2021-02-09 ENCOUNTER — TELEPHONE (OUTPATIENT)
Dept: FAMILY MEDICINE CLINIC | Facility: CLINIC | Age: 56
End: 2021-02-09

## 2021-02-09 ENCOUNTER — HOSPITAL ENCOUNTER (EMERGENCY)
Age: 56
Discharge: HOME | End: 2021-02-09
Attending: EMERGENCY MEDICINE
Payer: MEDICAID

## 2021-02-09 VITALS
RESPIRATION RATE: 18 BRPM | WEIGHT: 135 LBS | SYSTOLIC BLOOD PRESSURE: 140 MMHG | HEART RATE: 68 BPM | TEMPERATURE: 98 F | DIASTOLIC BLOOD PRESSURE: 83 MMHG | BODY MASS INDEX: 23.92 KG/M2 | HEIGHT: 63 IN | OXYGEN SATURATION: 100 %

## 2021-02-09 DIAGNOSIS — Z23 NEED FOR VACCINATION: ICD-10-CM

## 2021-02-09 DIAGNOSIS — R07.89 CHEST PAIN, MUSCULOSKELETAL: ICD-10-CM

## 2021-02-09 DIAGNOSIS — R00.2 PALPITATIONS: Primary | ICD-10-CM

## 2021-02-09 LAB
ALBUMIN SERPL-MCNC: 4.1 G/DL (ref 3.4–5)
ALBUMIN/GLOB SERPL: 1.2 {RATIO} (ref 1–2)
ALP LIVER SERPL-CCNC: 62 U/L
ALT SERPL-CCNC: 19 U/L
ANION GAP SERPL CALC-SCNC: 2 MMOL/L (ref 0–18)
AST SERPL-CCNC: 9 U/L (ref 15–37)
BASOPHILS # BLD AUTO: 0.02 X10(3) UL (ref 0–0.2)
BASOPHILS NFR BLD AUTO: 0.3 %
BILIRUB SERPL-MCNC: 0.3 MG/DL (ref 0.1–2)
BUN BLD-MCNC: 9 MG/DL (ref 7–18)
BUN/CREAT SERPL: 12.9 (ref 10–20)
CALCIUM BLD-MCNC: 9.3 MG/DL (ref 8.5–10.1)
CHLORIDE SERPL-SCNC: 105 MMOL/L (ref 98–112)
CO2 SERPL-SCNC: 31 MMOL/L (ref 21–32)
CREAT BLD-MCNC: 0.7 MG/DL
DEPRECATED RDW RBC AUTO: 42.8 FL (ref 35.1–46.3)
EOSINOPHIL # BLD AUTO: 0.05 X10(3) UL (ref 0–0.7)
EOSINOPHIL NFR BLD AUTO: 0.8 %
ERYTHROCYTE [DISTWIDTH] IN BLOOD BY AUTOMATED COUNT: 13.1 % (ref 11–15)
GLOBULIN PLAS-MCNC: 3.5 G/DL (ref 2.8–4.4)
GLUCOSE BLD-MCNC: 97 MG/DL (ref 70–99)
HCT VFR BLD AUTO: 42.3 %
HGB BLD-MCNC: 13.7 G/DL
IMM GRANULOCYTES # BLD AUTO: 0.01 X10(3) UL (ref 0–1)
IMM GRANULOCYTES NFR BLD: 0.2 %
LYMPHOCYTES # BLD AUTO: 2.36 X10(3) UL (ref 1–4)
LYMPHOCYTES NFR BLD AUTO: 36.1 %
M PROTEIN MFR SERPL ELPH: 7.6 G/DL (ref 6.4–8.2)
MCH RBC QN AUTO: 29.2 PG (ref 26–34)
MCHC RBC AUTO-ENTMCNC: 32.4 G/DL (ref 31–37)
MCV RBC AUTO: 90.2 FL
MONOCYTES # BLD AUTO: 0.56 X10(3) UL (ref 0.1–1)
MONOCYTES NFR BLD AUTO: 8.6 %
NEUTROPHILS # BLD AUTO: 3.54 X10 (3) UL (ref 1.5–7.7)
NEUTROPHILS # BLD AUTO: 3.54 X10(3) UL (ref 1.5–7.7)
NEUTROPHILS NFR BLD AUTO: 54 %
OSMOLALITY SERPL CALC.SUM OF ELEC: 285 MOSM/KG (ref 275–295)
PLATELET # BLD AUTO: 227 10(3)UL (ref 150–450)
POTASSIUM SERPL-SCNC: 3.8 MMOL/L (ref 3.5–5.1)
RBC # BLD AUTO: 4.69 X10(6)UL
SODIUM SERPL-SCNC: 138 MMOL/L (ref 136–145)
TROPONIN I SERPL-MCNC: <0.045 NG/ML (ref ?–0.04)
TSI SER-ACNC: 1.43 MIU/ML (ref 0.36–3.74)
WBC # BLD AUTO: 6.5 X10(3) UL (ref 4–11)

## 2021-02-09 PROCEDURE — 71045 X-RAY EXAM CHEST 1 VIEW: CPT | Performed by: EMERGENCY MEDICINE

## 2021-02-09 PROCEDURE — 93005 ELECTROCARDIOGRAM TRACING: CPT

## 2021-02-09 PROCEDURE — 80053 COMPREHEN METABOLIC PANEL: CPT | Performed by: EMERGENCY MEDICINE

## 2021-02-09 PROCEDURE — 99285 EMERGENCY DEPT VISIT HI MDM: CPT

## 2021-02-09 PROCEDURE — 99284 EMERGENCY DEPT VISIT MOD MDM: CPT

## 2021-02-09 PROCEDURE — 84443 ASSAY THYROID STIM HORMONE: CPT | Performed by: EMERGENCY MEDICINE

## 2021-02-09 PROCEDURE — 36415 COLL VENOUS BLD VENIPUNCTURE: CPT

## 2021-02-09 PROCEDURE — 84484 ASSAY OF TROPONIN QUANT: CPT | Performed by: EMERGENCY MEDICINE

## 2021-02-09 PROCEDURE — 85025 COMPLETE CBC W/AUTO DIFF WBC: CPT | Performed by: EMERGENCY MEDICINE

## 2021-02-09 PROCEDURE — 93010 ELECTROCARDIOGRAM REPORT: CPT

## 2021-02-09 NOTE — TELEPHONE ENCOUNTER
Called patient who states she only had the one episode of chest pain while shoveling. Now has neck pain that radiates to left shoulder.  thinks it's muscle strain due to carrying shopping bags.  Advised she has hx of abnormal EKG 3 years ago and did

## 2021-02-09 NOTE — ED INITIAL ASSESSMENT (HPI)
Pt c/o palpitations after shoveling snow on Friday last week, states symptoms went away the next day. Monday pt states she started to c/o left shoulder pain. Pt has no complaints of pain, palpitations or SOB at this time.  Pt called PCP and was referred to

## 2021-02-10 NOTE — TELEPHONE ENCOUNTER
Scheduled f/u     Future Appointments   Date Time Provider Abi Snowden   2/16/2021 12:20 PM Rosie Kulkarni MD [facility]

## 2021-02-10 NOTE — TELEPHONE ENCOUNTER
Noted patient seen In emergency department and symptoms thought to be musculoskeletal.  Noted blood pressure was elevated in ED and some nonspecific EKG changes which may need further work-up. Please schedule follow-up appointment. (0) independent

## 2021-02-11 LAB
ATRIAL RATE: 79 BPM
P AXIS: 47 DEGREES
P-R INTERVAL: 136 MS
Q-T INTERVAL: 362 MS
QRS DURATION: 96 MS
QTC CALCULATION (BEZET): 415 MS
R AXIS: -33 DEGREES
T AXIS: 38 DEGREES
VENTRICULAR RATE: 79 BPM

## 2021-02-16 ENCOUNTER — OFFICE VISIT (OUTPATIENT)
Dept: FAMILY MEDICINE CLINIC | Facility: CLINIC | Age: 56
End: 2021-02-16
Payer: MEDICAID

## 2021-02-16 VITALS
SYSTOLIC BLOOD PRESSURE: 128 MMHG | HEART RATE: 84 BPM | OXYGEN SATURATION: 97 % | BODY MASS INDEX: 23.92 KG/M2 | RESPIRATION RATE: 16 BRPM | TEMPERATURE: 97 F | DIASTOLIC BLOOD PRESSURE: 64 MMHG | HEIGHT: 63 IN | WEIGHT: 135 LBS

## 2021-02-16 DIAGNOSIS — R94.31 NONSPECIFIC ABNORMAL ELECTROCARDIOGRAM (ECG) (EKG): ICD-10-CM

## 2021-02-16 DIAGNOSIS — R07.89 OTHER CHEST PAIN: ICD-10-CM

## 2021-02-16 DIAGNOSIS — N92.0 MENORRHAGIA WITH REGULAR CYCLE: ICD-10-CM

## 2021-02-16 DIAGNOSIS — R00.2 PALPITATIONS: Primary | ICD-10-CM

## 2021-02-16 DIAGNOSIS — Z20.822 ENCOUNTER FOR LABORATORY TESTING FOR COVID-19 VIRUS: ICD-10-CM

## 2021-02-16 DIAGNOSIS — I10 ESSENTIAL HYPERTENSION: ICD-10-CM

## 2021-02-16 DIAGNOSIS — R79.0 LOW FERRITIN: ICD-10-CM

## 2021-02-16 PROCEDURE — 3074F SYST BP LT 130 MM HG: CPT | Performed by: FAMILY MEDICINE

## 2021-02-16 PROCEDURE — 3008F BODY MASS INDEX DOCD: CPT | Performed by: FAMILY MEDICINE

## 2021-02-16 PROCEDURE — 3078F DIAST BP <80 MM HG: CPT | Performed by: FAMILY MEDICINE

## 2021-02-16 PROCEDURE — 99214 OFFICE O/P EST MOD 30 MIN: CPT | Performed by: FAMILY MEDICINE

## 2021-02-16 RX ORDER — LOSARTAN POTASSIUM 25 MG/1
TABLET ORAL
Qty: 90 TABLET | Refills: 2 | Status: SHIPPED | OUTPATIENT
Start: 2021-02-16

## 2021-02-16 NOTE — PROGRESS NOTES
Cortes Lynn is a 54year old female. HPI:  Pt is here for f/u after recent ER visit on 2/9/2021. Was shoveling snow and noted palpitations and then noted generalized weakness in body/arms/legs.  No dizziness  Rested and felt better  3-4  days later no HEALTH: feels well otherwise, mood is good  SKIN: denies any unusual skin lesions or rashes  RESPIRATORY: denies shortness of breath with exertion, chronic, intermittent cough. Overall much improved compared to previous. Has seen specialists.   CARDIOVASC Continue current medication. Lytes normal.   - Losartan Potassium 25 MG Oral Tab; TAKE ONE TABLET BY MOUTH ONCE DAILY  Dispense: 90 tablet; Refill: 2  - CARD ECHO STRESS ECHO/REST AND STRESS(CPT=93350/22026 [facility] [address]); Future    5.  Menorrhagia with regular

## 2021-02-19 ENCOUNTER — LAB ENCOUNTER (OUTPATIENT)
Dept: LAB | Age: 56
End: 2021-02-19
Attending: FAMILY MEDICINE
Payer: MEDICAID

## 2021-02-19 DIAGNOSIS — Z20.822 ENCOUNTER FOR LABORATORY TESTING FOR COVID-19 VIRUS: ICD-10-CM

## 2021-02-20 LAB — SARS-COV-2 RNA RESP QL NAA+PROBE: NOT DETECTED

## 2021-02-22 ENCOUNTER — HOSPITAL ENCOUNTER (OUTPATIENT)
Dept: CV DIAGNOSTICS | Facility: HOSPITAL | Age: 56
Discharge: HOME | End: 2021-02-22
Attending: FAMILY MEDICINE
Payer: MEDICAID

## 2021-02-22 ENCOUNTER — HOSPITAL ENCOUNTER (OUTPATIENT)
Dept: CV DIAGNOSTICS | Age: 56
Discharge: HOME | End: 2021-02-22
Attending: FAMILY MEDICINE
Payer: MEDICAID

## 2021-02-22 DIAGNOSIS — R94.31 NONSPECIFIC ABNORMAL ELECTROCARDIOGRAM (ECG) (EKG): ICD-10-CM

## 2021-02-22 DIAGNOSIS — I10 ESSENTIAL HYPERTENSION: ICD-10-CM

## 2021-02-22 DIAGNOSIS — R07.89 OTHER CHEST PAIN: ICD-10-CM

## 2021-02-22 DIAGNOSIS — R00.2 PALPITATIONS: ICD-10-CM

## 2021-02-22 PROCEDURE — 93271 ECG/MONITORING AND ANALYSIS: CPT | Performed by: FAMILY MEDICINE

## 2021-02-22 PROCEDURE — 93350 STRESS TTE ONLY: CPT | Performed by: FAMILY MEDICINE

## 2021-02-22 PROCEDURE — 93017 CV STRESS TEST TRACING ONLY: CPT | Performed by: FAMILY MEDICINE

## 2021-02-22 PROCEDURE — 93018 CV STRESS TEST I&R ONLY: CPT | Performed by: FAMILY MEDICINE

## 2021-02-22 PROCEDURE — 93228 REMOTE 30 DAY ECG REV/REPORT: CPT | Performed by: FAMILY MEDICINE

## 2021-03-06 ENCOUNTER — IMMUNIZATION (OUTPATIENT)
Dept: LAB | Facility: HOSPITAL | Age: 56
End: 2021-03-06
Attending: HOSPITALIST
Payer: MEDICAID

## 2021-03-06 DIAGNOSIS — Z23 NEED FOR VACCINATION: Primary | ICD-10-CM

## 2021-03-06 PROCEDURE — 0011A SARSCOV2 VAC 100MCG/0.5ML IM: CPT

## 2021-04-03 ENCOUNTER — IMMUNIZATION (OUTPATIENT)
Dept: LAB | Facility: HOSPITAL | Age: 56
End: 2021-04-03
Attending: EMERGENCY MEDICINE
Payer: MEDICAID

## 2021-04-03 DIAGNOSIS — Z23 NEED FOR VACCINATION: Primary | ICD-10-CM

## 2021-04-03 PROCEDURE — 0012A SARSCOV2 VAC 100MCG/0.5ML IM: CPT

## 2021-05-06 DIAGNOSIS — I10 ESSENTIAL HYPERTENSION: ICD-10-CM

## 2021-05-06 RX ORDER — LOSARTAN POTASSIUM 25 MG/1
TABLET ORAL
Qty: 90 TABLET | Refills: 0 | OUTPATIENT
Start: 2021-05-06

## 2021-10-24 NOTE — TELEPHONE ENCOUNTER
HOSPITALIST SERVICE ADMISSION NOTE     Patient: Princess Godinez Date: 10/23/2021   YOB: 1996 Admission Date: 10/23/2021   MRN: [id number] Attending: David Szymanski MD     PMD: Bebeto Mcgovern MD    Chief Complaint:   Chief Complaint   Patient presents with   • Shortness of Breath        HISTORY AND PHYSICAL     HPI:  Princess Godinez is a 25 year old female with a PMH (past medical history) significant for morbid obesity and asthma this time presented with shortness of breath. Patient has worsening shortness of breath, cough producing some phlegm, wheezing and chest tightness which started last Friday, a week back.  Patient tried her nebulizer at home with no improvement after which she came to the ER.  Patient denied any fever or chills.  No chest pain or palpitation. Patient has persistent asthma and used take her inhalers every day in the past. She has last hospital admission the previous year due to asth,ma exacerbation. Denied any nausea or vomiting.  No nasal stuffiness or running nose or throat ache. No urinary complaints.    At th ER, noted to have elevated blood pressure. Her lab workup and chest x ray were not remarkable.   Patient was given hour long treatment and prednisone however she was not improving after which she was admitted for further care. Covid test pending. .    Past Medical History:   Diagnosis Date   • RAD (reactive airway disease)      Past surgical history: Reviewed, noting pertinent  Family history:  Reviewed, noting pertinent    ALLERGIES:   Allergen Reactions   • Pollen Runny Nose, SHORTNESS OF BREATH and THROAT SWELLING     SOCIAL HISTORY:  Social History     Tobacco Use   • Smoking status: Never Smoker   • Smokeless tobacco: Never Used   Substance Use Topics   • Alcohol use: No   • Drug use: No         REVIEW OF SYSTEMS     GEN: Denies headache, fever, chills, fatigue.  HEENT: Denies change in vision, rhinorrhea.  CV: Denies chest pain. Has VILLEDA (dyspnea on exertion). No  Dr. Lulú Hernandez,  Please advise    LOV  9/27/19  Work note has lifting restrictions. Does this patient need to be seen by Dr. Taisha Bruno for follow up or can note be given to return to work without restrictions. lower extremity edema, syncope, palpitation  RESP: Has cough, SOB (shortness of breath), wheezing.  GI: Denies nausea, vomiting, diarrhea, constipation.   : Denies dysuria, frequency.  MSKT: Denies myalgia, arthralgia.  HEME: Denies bleeding and easy bruisability.  SKIN: Denies rash.  NEURO: Denies loss of consciousness, sensory and motor deficits.  PSYCH: Denies chemical dependency/abuse.     PHYSICAL EXAM     Vital Signs (most recent):   Visit Vitals  BP (!) 161/81   Pulse (!) 121   Temp 98.5 °F (36.9 °C) (Oral)   Resp 20   LMP 05/24/2021 (Exact Date)   SpO2 93%       General - Patient is in no acute distress. Patient is conversing freely in full sentences.   HEENT - Pupils equally round and reactive to light. Sclerae are anicteric. Oropharyngeal MM (mucous membranes) are moist. Neck is soft and supple.   CV - Regular rate and rhythm without additional heart sounds. No carotid bruits. Dorsalis pedis and femoral  pulses are 2+ and symmetric.   Pulm - moderate wheezing on both sides of the lung, minimal use of accessory muscles  Abd - Soft, nontender and nondistended. Bowel sounds are normoactive. No guarding or rebound tenderness.   LETICIA - No CVA (costovertebral angle) tenderness.  Ext - Warm and well perfused. No cyanosis or edema.   Skin- No rashes or lesions.   Neuro - Alert and orient to person, place and time. CNs (cranial nerves) II-XII are intact. Strength, sensation, and coordination are grossly intact.       LABS       CBC:  Lab Results   Component Value Date/Time    WBC 17.5 (H) 10/23/2021 07:25 PM    HGB 13.2 10/23/2021 07:25 PM    HCT 41.8 10/23/2021 07:25 PM     10/23/2021 07:25 PM       CMP:  Lab Results   Component Value Date/Time    SODIUM 142 10/23/2021 07:25 PM    POTASSIUM 3.4 10/23/2021 07:25 PM    CHLORIDE 105 10/23/2021 07:25 PM    CO2 26 10/23/2021 07:25 PM    BUN 14 10/23/2021 07:25 PM    CREATININE 0.68 10/23/2021 07:25 PM    GLUCOSE 98 10/23/2021 07:25 PM    CALCIUM 9.2 10/23/2021  07:25 PM     Cardiac Markers:  No results found for: MYOGLOBIN, RAPDTR, CPK, MMB, BNP    IMAGING      XR CHEST AP OR PA - PORTABLE   Final Result   IMPRESSION: No acute cardiopulmonary disease. No interval change.                ASSESSMENT AND PLAN     Princess Godinez is a 25 year old female presenting with SOB. We will admit the patient for further evaluation and work up as follows:      · Acute asthma exacerbation:  in patient with moderate persistent asthma. Symptoms since the last 1 week, patient tried her nebulizer at home with no improvement.  Started with Breo, DuoNeb, albuterol, prednisone. Patient saturating normal, chest x-ray not remarkable, covid test pending.    · Leukocytosis:  No signs of infection so far, monitor CBC   · Morbid obesity: BMI of 42, lifestyle management   · Diet: Regular  · DVT prophylaxis: Anamaria Szymanski MD  [facility] Hospitalist  Pager: [phone]  From 7PM to 7AM, please call the hospitalist on call: [phone] ([phone])

## 2021-11-01 ENCOUNTER — OFFICE VISIT (OUTPATIENT)
Dept: FAMILY MEDICINE CLINIC | Facility: CLINIC | Age: 56
End: 2021-11-01
Payer: MEDICAID

## 2021-11-01 VITALS
RESPIRATION RATE: 16 BRPM | HEIGHT: 64.5 IN | DIASTOLIC BLOOD PRESSURE: 78 MMHG | HEART RATE: 68 BPM | BODY MASS INDEX: 22.94 KG/M2 | SYSTOLIC BLOOD PRESSURE: 124 MMHG | WEIGHT: 136 LBS | OXYGEN SATURATION: 98 % | TEMPERATURE: 97 F

## 2021-11-01 DIAGNOSIS — L84 CALLUS OF FOOT: Primary | ICD-10-CM

## 2021-11-01 DIAGNOSIS — I10 ESSENTIAL HYPERTENSION: ICD-10-CM

## 2021-11-01 PROCEDURE — 3008F BODY MASS INDEX DOCD: CPT | Performed by: FAMILY MEDICINE

## 2021-11-01 PROCEDURE — 11055 PARING/CUTG B9 HYPRKER LES 1: CPT | Performed by: FAMILY MEDICINE

## 2021-11-01 PROCEDURE — 3074F SYST BP LT 130 MM HG: CPT | Performed by: FAMILY MEDICINE

## 2021-11-01 PROCEDURE — 3078F DIAST BP <80 MM HG: CPT | Performed by: FAMILY MEDICINE

## 2021-11-01 PROCEDURE — 99213 OFFICE O/P EST LOW 20 MIN: CPT | Performed by: FAMILY MEDICINE

## 2021-11-01 NOTE — PROGRESS NOTES
Rosalina Cool is a 54year old female. HPI:  Has a wart or callus on R foot for over 10 years that patient has had treatment for on multiple occasions, but then recurs.   Saw Podiatrist and had surgical removal about 2 years ago, but this keeps recurring Use      Vaping Use: Never used    Alcohol use: No      Alcohol/week: 0.0 standard drinks    Drug use: No                  REVIEW OF SYSTEMS:  GENERAL HEALTH: feels well otherwise, mood is good  SKIN: As above  RESPIRATORY: denies shortness of breat well  Notes some decrease in sxs after tx. Keep area c/d   advised area may blister after cryo treatment. F/u in 2-3 weeks for retreatment if persists    2. Essential hypertension  Blood pressure well controlled on low-dose losartan.   Reviewed diet and e

## 2021-11-01 NOTE — PATIENT INSTRUCTIONS
What Are Corns and Calluses? Corns and calluses are your body’s response to friction or pressure against the skin. If your foot rubs the inside of your shoe, the affected area of skin thickens.  Or, if a bone is not in the normal position, skin caught need. In more severe cases, treating tissue buildup may require your doctor’s care. Sometimes custom-made shoe inserts (orthotics) or special pads are prescribed to reduce friction and pressure.    Change shoes  If you have corns, your doctor may suggest we

## 2021-11-19 ENCOUNTER — OFFICE VISIT (OUTPATIENT)
Dept: FAMILY MEDICINE CLINIC | Facility: CLINIC | Age: 56
End: 2021-11-19
Payer: MEDICAID

## 2021-11-19 VITALS
WEIGHT: 135 LBS | TEMPERATURE: 97 F | RESPIRATION RATE: 16 BRPM | HEIGHT: 64.5 IN | BODY MASS INDEX: 22.77 KG/M2 | OXYGEN SATURATION: 98 % | HEART RATE: 70 BPM | SYSTOLIC BLOOD PRESSURE: 122 MMHG | DIASTOLIC BLOOD PRESSURE: 68 MMHG

## 2021-11-19 DIAGNOSIS — Z00.00 LABORATORY EXAMINATION ORDERED AS PART OF A COMPLETE PHYSICAL EXAMINATION: ICD-10-CM

## 2021-11-19 DIAGNOSIS — E55.9 VITAMIN D DEFICIENCY: ICD-10-CM

## 2021-11-19 DIAGNOSIS — D50.8 OTHER IRON DEFICIENCY ANEMIA: ICD-10-CM

## 2021-11-19 DIAGNOSIS — L84 CALLUS OF FOOT: Primary | ICD-10-CM

## 2021-11-19 DIAGNOSIS — I10 ESSENTIAL HYPERTENSION: ICD-10-CM

## 2021-11-19 DIAGNOSIS — Z13.21 ENCOUNTER FOR VITAMIN DEFICIENCY SCREENING: ICD-10-CM

## 2021-11-19 DIAGNOSIS — H93.8X3 EAR POPPING, BILATERAL: ICD-10-CM

## 2021-11-19 PROCEDURE — 11055 PARING/CUTG B9 HYPRKER LES 1: CPT | Performed by: FAMILY MEDICINE

## 2021-11-19 PROCEDURE — 99213 OFFICE O/P EST LOW 20 MIN: CPT | Performed by: FAMILY MEDICINE

## 2021-11-19 PROCEDURE — 3078F DIAST BP <80 MM HG: CPT | Performed by: FAMILY MEDICINE

## 2021-11-19 PROCEDURE — 3074F SYST BP LT 130 MM HG: CPT | Performed by: FAMILY MEDICINE

## 2021-11-19 PROCEDURE — 3008F BODY MASS INDEX DOCD: CPT | Performed by: FAMILY MEDICINE

## 2021-11-19 NOTE — PROGRESS NOTES
Deb Lowry is a 64year old female. HPI:  Patient continues to have painful skin lesion on right plantar foot. Noted transient improvement after treatment last office visit about 3 weeks ago with trimming and cryo.   Did not have any blistering after (Approximate)   SpO2 98%   BMI 22.81 kg/m²   Wt Readings from Last 6 Encounters:  12/10/21 : 133 lb (60.3 kg)  11/19/21 : 135 lb (61.2 kg)  11/01/21 : 136 lb (61.7 kg)  03/15/21 : 135 lb 9.6 oz (61.5 kg)  03/05/21 : 137 lb (62.1 kg)  02/16/21 : 135 lb (61. DIFFERENTIAL WITH PLATELET  - LIPID PANEL  - TSH W REFLEX TO FREE T4  - HEMOGLOBIN A1C  - VITAMIN D, 25-HYDROXY    4. Other iron deficiency anemia  Increase iron in diet.   Monitor.  - CBC WITH DIFFERENTIAL WITH PLATELET  - FERRITIN    5. Vitamin D deficien

## 2021-12-10 ENCOUNTER — OFFICE VISIT (OUTPATIENT)
Dept: FAMILY MEDICINE CLINIC | Facility: CLINIC | Age: 56
End: 2021-12-10
Payer: MEDICAID

## 2021-12-10 VITALS
SYSTOLIC BLOOD PRESSURE: 124 MMHG | HEIGHT: 64.5 IN | OXYGEN SATURATION: 98 % | WEIGHT: 133 LBS | RESPIRATION RATE: 16 BRPM | HEART RATE: 83 BPM | DIASTOLIC BLOOD PRESSURE: 66 MMHG | BODY MASS INDEX: 22.43 KG/M2 | TEMPERATURE: 97 F

## 2021-12-10 DIAGNOSIS — E55.9 VITAMIN D DEFICIENCY: ICD-10-CM

## 2021-12-10 DIAGNOSIS — Z12.31 VISIT FOR SCREENING MAMMOGRAM: ICD-10-CM

## 2021-12-10 DIAGNOSIS — D72.818 OTHER DECREASED WHITE BLOOD CELL (WBC) COUNT: ICD-10-CM

## 2021-12-10 DIAGNOSIS — E78.49 OTHER HYPERLIPIDEMIA: ICD-10-CM

## 2021-12-10 DIAGNOSIS — Z51.81 ENCOUNTER FOR MEDICATION MONITORING: ICD-10-CM

## 2021-12-10 DIAGNOSIS — I10 ESSENTIAL HYPERTENSION: ICD-10-CM

## 2021-12-10 DIAGNOSIS — L84 CALLUS OF FOOT: Primary | ICD-10-CM

## 2021-12-10 DIAGNOSIS — R00.2 PALPITATIONS: ICD-10-CM

## 2021-12-10 PROCEDURE — 3008F BODY MASS INDEX DOCD: CPT | Performed by: FAMILY MEDICINE

## 2021-12-10 PROCEDURE — 11055 PARING/CUTG B9 HYPRKER LES 1: CPT | Performed by: FAMILY MEDICINE

## 2021-12-10 PROCEDURE — 99214 OFFICE O/P EST MOD 30 MIN: CPT | Performed by: FAMILY MEDICINE

## 2021-12-10 PROCEDURE — 3078F DIAST BP <80 MM HG: CPT | Performed by: FAMILY MEDICINE

## 2021-12-10 PROCEDURE — 3074F SYST BP LT 130 MM HG: CPT | Performed by: FAMILY MEDICINE

## 2021-12-10 RX ORDER — ERGOCALCIFEROL 1.25 MG/1
50000 CAPSULE ORAL WEEKLY
Qty: 12 CAPSULE | Refills: 0 | Status: SHIPPED | OUTPATIENT
Start: 2021-12-10 | End: 2022-01-09

## 2021-12-10 NOTE — PROGRESS NOTES
Kirill Dobbs is a 64year old female. HPI:  Patient is here for follow-up on right foot lesion and to review recent labs. Still having recurrence of R foot callus. Painful. Hard to bear weight for too long.  In-office treatments do help for a few days used    Alcohol use: No      Alcohol/week: 0.0 standard drinks    Drug use: No  .                  REVIEW OF SYSTEMS:  GENERAL HEALTH: feels well otherwise, mood is good  SKIN:as above  RESPIRATORY: denies shortness of breath with exertion, no cough medication. 3. Other hyperlipidemia  Reviewed lipid panel with normal HDL and triglycerides. LDL elevated, higher than previous. Reviewed low-fat/low-cholesterol diet and regular exercise. Monitor with TLC. Plan recheck in about 6 months.     4. Palp

## 2021-12-23 ENCOUNTER — HOSPITAL ENCOUNTER (OUTPATIENT)
Dept: ULTRASOUND IMAGING | Age: 56
Discharge: HOME | End: 2021-12-23
Attending: OBSTETRICS & GYNECOLOGY
Payer: MEDICAID

## 2021-12-23 DIAGNOSIS — N95.1 PERIMENOPAUSE: ICD-10-CM

## 2021-12-23 PROCEDURE — 76830 TRANSVAGINAL US NON-OB: CPT | Performed by: OBSTETRICS & GYNECOLOGY

## 2021-12-23 PROCEDURE — 76856 US EXAM PELVIC COMPLETE: CPT | Performed by: OBSTETRICS & GYNECOLOGY

## 2022-01-12 ENCOUNTER — OFFICE VISIT (OUTPATIENT)
Dept: ORTHOPEDICS CLINIC | Facility: CLINIC | Age: 57
End: 2022-01-12
Payer: MEDICAID

## 2022-01-12 VITALS — WEIGHT: 134 LBS | BODY MASS INDEX: 23.74 KG/M2 | HEIGHT: 63 IN

## 2022-01-12 DIAGNOSIS — M79.671 RIGHT FOOT PAIN: ICD-10-CM

## 2022-01-12 DIAGNOSIS — R26.9 GAIT ABNORMALITY: ICD-10-CM

## 2022-01-12 DIAGNOSIS — M35.7 BENIGN JOINT HYPERMOBILITY: ICD-10-CM

## 2022-01-12 DIAGNOSIS — Q82.8 POROKERATOSIS: Primary | ICD-10-CM

## 2022-01-12 PROCEDURE — 99203 OFFICE O/P NEW LOW 30 MIN: CPT | Performed by: PODIATRIST

## 2022-01-12 PROCEDURE — 3008F BODY MASS INDEX DOCD: CPT | Performed by: PODIATRIST

## 2022-01-12 NOTE — PROGRESS NOTES
[facility] New Patient Note    CC: Patient presents with:  Callus: right foot      HPI: The patient is a 64year old female who presents today with complaints of callus on the bottom of her right foot that has been present for years, several tr hypermobility and general tenderness across the metatarsal heads but especially at the second MP joint where she has a porokeratosis or a clogged sweat gland type of lesion. Mild shear callus across this area as well.           Assessment/Diagnoses:  Diagn

## 2022-01-17 ENCOUNTER — HOSPITAL ENCOUNTER (OUTPATIENT)
Dept: MAMMOGRAPHY | Age: 57
Discharge: HOME | End: 2022-01-17
Attending: FAMILY MEDICINE
Payer: MEDICAID

## 2022-01-17 DIAGNOSIS — Z12.31 VISIT FOR SCREENING MAMMOGRAM: ICD-10-CM

## 2022-01-17 PROCEDURE — 77063 BREAST TOMOSYNTHESIS BI: CPT | Performed by: FAMILY MEDICINE

## 2022-01-17 PROCEDURE — 77067 SCR MAMMO BI INCL CAD: CPT | Performed by: FAMILY MEDICINE

## 2022-01-24 ENCOUNTER — TELEPHONE (OUTPATIENT)
Dept: ORTHOPEDICS CLINIC | Facility: CLINIC | Age: 57
End: 2022-01-24

## 2022-01-24 NOTE — TELEPHONE ENCOUNTER
Reviewed with [employer] employee's patient's insurance is out of network with them. Patient was informed out of pocket cost would be $200 for orthotics if ordered through them. Message routed to Dr. Dobbs Deal to review. Please advise where you like patient to go for orthotics that might be in network with her insurance.

## 2022-01-24 NOTE — TELEPHONE ENCOUNTER
Patient would like to know what other places to get the Orthotics done besides the one she went to because insurance is not covering them. Please advise.     Patient can be reached at [phone]

## 2022-01-25 NOTE — TELEPHONE ENCOUNTER
Thanks for the info, spoke with patient will wait until the she calls the other that was recommended, and will follow-up next time.

## 2022-01-25 NOTE — TELEPHONE ENCOUNTER
Contacted patient, not able to get a hold of her. Left message with Dr. Flash Rios recommendations. Advised in message for patient to contact out office to schedule a follow up apt with Dr. Basia Sabillon if she decides to purchase shoes through any of her recommendations. Message routed to  staff as Joe Jayla.

## 2022-07-05 ENCOUNTER — OFFICE VISIT (OUTPATIENT)
Dept: FAMILY MEDICINE CLINIC | Facility: CLINIC | Age: 57
End: 2022-07-05
Payer: MEDICAID

## 2022-07-05 VITALS
OXYGEN SATURATION: 97 % | BODY MASS INDEX: 22.29 KG/M2 | TEMPERATURE: 97 F | SYSTOLIC BLOOD PRESSURE: 120 MMHG | RESPIRATION RATE: 16 BRPM | DIASTOLIC BLOOD PRESSURE: 82 MMHG | HEIGHT: 63.74 IN | WEIGHT: 129 LBS | HEART RATE: 84 BPM

## 2022-07-05 DIAGNOSIS — I10 ESSENTIAL HYPERTENSION: ICD-10-CM

## 2022-07-05 DIAGNOSIS — Z97.5 IUD (INTRAUTERINE DEVICE) IN PLACE: ICD-10-CM

## 2022-07-05 DIAGNOSIS — Z12.31 VISIT FOR SCREENING MAMMOGRAM: ICD-10-CM

## 2022-07-05 DIAGNOSIS — H53.10 SUBJECTIVE VISION DISTURBANCE: ICD-10-CM

## 2022-07-05 DIAGNOSIS — E55.9 VITAMIN D DEFICIENCY: ICD-10-CM

## 2022-07-05 DIAGNOSIS — N92.6 IRREGULAR MENSTRUATION: ICD-10-CM

## 2022-07-05 DIAGNOSIS — Z12.11 SCREEN FOR COLON CANCER: ICD-10-CM

## 2022-07-05 DIAGNOSIS — Z01.419 WELL WOMAN EXAM WITH ROUTINE GYNECOLOGICAL EXAM: Primary | ICD-10-CM

## 2022-07-05 DIAGNOSIS — R05.9 COUGH: ICD-10-CM

## 2022-07-05 DIAGNOSIS — N92.0 SPOTTING: ICD-10-CM

## 2022-07-05 DIAGNOSIS — Z51.81 ENCOUNTER FOR MEDICATION MONITORING: ICD-10-CM

## 2022-07-05 DIAGNOSIS — Z00.00 LABORATORY EXAMINATION ORDERED AS PART OF A COMPLETE PHYSICAL EXAMINATION: ICD-10-CM

## 2022-07-05 PROCEDURE — 3008F BODY MASS INDEX DOCD: CPT | Performed by: FAMILY MEDICINE

## 2022-07-05 PROCEDURE — 3079F DIAST BP 80-89 MM HG: CPT | Performed by: FAMILY MEDICINE

## 2022-07-05 PROCEDURE — 87624 HPV HI-RISK TYP POOLED RSLT: CPT | Performed by: FAMILY MEDICINE

## 2022-07-05 PROCEDURE — 99396 PREV VISIT EST AGE 40-64: CPT | Performed by: FAMILY MEDICINE

## 2022-07-05 PROCEDURE — 3074F SYST BP LT 130 MM HG: CPT | Performed by: FAMILY MEDICINE

## 2022-07-05 RX ORDER — LOSARTAN POTASSIUM 25 MG/1
TABLET ORAL
Qty: 90 TABLET | Refills: 3 | Status: SHIPPED | OUTPATIENT
Start: 2022-07-05

## 2022-07-06 LAB — HPV I/H RISK 1 DNA SPEC QL NAA+PROBE: NEGATIVE

## 2022-07-09 LAB
ABSOLUTE BASOPHILS: 10 CELLS/UL (ref 0–200)
ABSOLUTE EOSINOPHILS: 61 CELLS/UL (ref 15–500)
ABSOLUTE LYMPHOCYTES: 1455 CELLS/UL (ref 850–3900)
ABSOLUTE MONOCYTES: 350 CELLS/UL (ref 200–950)
ABSOLUTE NEUTROPHILS: 1523 CELLS/UL (ref 1500–7800)
ALBUMIN/GLOBULIN RATIO: 1.8 (CALC) (ref 1–2.5)
ALBUMIN: 4.2 G/DL (ref 3.6–5.1)
ALKALINE PHOSPHATASE: 38 U/L (ref 37–153)
ALT: 9 U/L (ref 6–29)
AST: 13 U/L (ref 10–35)
BASOPHILS: 0.3 %
BILIRUBIN, TOTAL: 0.6 MG/DL (ref 0.2–1.2)
BUN: 9 MG/DL (ref 7–25)
CALCIUM: 9.1 MG/DL (ref 8.6–10.4)
CARBON DIOXIDE: 28 MMOL/L (ref 20–32)
CHLORIDE: 104 MMOL/L (ref 98–110)
CHOL/HDLC RATIO: 4.2 (CALC)
CHOLESTEROL, TOTAL: 208 MG/DL
CREATININE: 0.6 MG/DL (ref 0.5–1.05)
EGFR IF AFRICN AM: 118 ML/MIN/1.73M2
EGFR IF NONAFRICN AM: 102 ML/MIN/1.73M2
EOSINOPHILS: 1.8 %
GLOBULIN: 2.3 G/DL (CALC) (ref 1.9–3.7)
GLUCOSE: 85 MG/DL (ref 65–99)
HDL CHOLESTEROL: 50 MG/DL
HEMATOCRIT: 42.1 % (ref 35–45)
HEMOGLOBIN A1C: 5.2 % OF TOTAL HGB
HEMOGLOBIN: 14.1 G/DL (ref 11.7–15.5)
LDL-CHOLESTEROL: 139 MG/DL (CALC)
LYMPHOCYTES: 42.8 %
MCH: 30.1 PG (ref 27–33)
MCHC: 33.5 G/DL (ref 32–36)
MCV: 90 FL (ref 80–100)
MONOCYTES: 10.3 %
MPV: 11.8 FL (ref 7.5–12.5)
NEUTROPHILS: 44.8 %
NON-HDL CHOLESTEROL: 158 MG/DL (CALC)
PLATELET COUNT: 203 THOUSAND/UL (ref 140–400)
POTASSIUM: 4.5 MMOL/L (ref 3.5–5.3)
PROTEIN, TOTAL: 6.5 G/DL (ref 6.1–8.1)
RDW: 12.7 % (ref 11–15)
RED BLOOD CELL COUNT: 4.68 MILLION/UL (ref 3.8–5.1)
SODIUM: 136 MMOL/L (ref 135–146)
TRIGLYCERIDES: 88 MG/DL
TSH W/REFLEX TO FT4: 1.22 MIU/L (ref 0.4–4.5)
VITAMIN D, 25-OH, TOTAL: 34 NG/ML (ref 30–100)
WHITE BLOOD CELL COUNT: 3.4 THOUSAND/UL (ref 3.8–10.8)

## 2022-10-03 ENCOUNTER — OFFICE VISIT (OUTPATIENT)
Dept: FAMILY MEDICINE CLINIC | Facility: CLINIC | Age: 57
End: 2022-10-03
Payer: MEDICAID

## 2022-10-03 VITALS
BODY MASS INDEX: 22.17 KG/M2 | HEIGHT: 63.78 IN | SYSTOLIC BLOOD PRESSURE: 112 MMHG | HEART RATE: 106 BPM | WEIGHT: 128.25 LBS | TEMPERATURE: 97 F | DIASTOLIC BLOOD PRESSURE: 64 MMHG | OXYGEN SATURATION: 98 % | RESPIRATION RATE: 16 BRPM

## 2022-10-03 DIAGNOSIS — R63.4 WEIGHT LOSS: ICD-10-CM

## 2022-10-03 DIAGNOSIS — I10 ESSENTIAL HYPERTENSION: ICD-10-CM

## 2022-10-03 DIAGNOSIS — H81.10 BENIGN PAROXYSMAL POSITIONAL VERTIGO, UNSPECIFIED LATERALITY: ICD-10-CM

## 2022-10-03 DIAGNOSIS — K29.00 OTHER ACUTE GASTRITIS WITHOUT HEMORRHAGE: Primary | ICD-10-CM

## 2022-10-03 PROCEDURE — 3078F DIAST BP <80 MM HG: CPT | Performed by: FAMILY MEDICINE

## 2022-10-03 PROCEDURE — 3008F BODY MASS INDEX DOCD: CPT | Performed by: FAMILY MEDICINE

## 2022-10-03 PROCEDURE — 3074F SYST BP LT 130 MM HG: CPT | Performed by: FAMILY MEDICINE

## 2022-10-03 PROCEDURE — 99214 OFFICE O/P EST MOD 30 MIN: CPT | Performed by: FAMILY MEDICINE

## 2022-10-03 RX ORDER — OMEPRAZOLE 40 MG/1
40 CAPSULE, DELAYED RELEASE ORAL DAILY
Qty: 30 CAPSULE | Refills: 2 | Status: SHIPPED | OUTPATIENT
Start: 2022-10-03 | End: 2023-09-28

## 2022-12-23 ENCOUNTER — TELEPHONE (OUTPATIENT)
Dept: FAMILY MEDICINE CLINIC | Facility: CLINIC | Age: 57
End: 2022-12-23

## 2022-12-23 NOTE — TELEPHONE ENCOUNTER
Patient called saying she tested positive for covid this morning. Has asthma and high b/p. Her symptoms are loss of taste and smell, sore throat, cough, body aches and she had a fever yesterday. Is taking Tylenol and Motrin. Please triage and advise.

## 2022-12-23 NOTE — TELEPHONE ENCOUNTER
Spoke with patient - name and  verified. Patient denies SOB or difficulty breathing. Increased cough and covid positive today. She was advised on home symptoms management of staying hydrated, warm tea with honey and lemon, lozenges, cough drops, OTC cough syrup, Advil and tylenol alternatively. If her symptoms worsen or persistent to go to [facility] or ER. She verbalized understanding.

## 2022-12-29 ENCOUNTER — APPOINTMENT (OUTPATIENT)
Dept: GENERAL RADIOLOGY | Age: 57
End: 2022-12-29
Attending: PHYSICIAN ASSISTANT
Payer: MEDICAID

## 2022-12-29 ENCOUNTER — HOSPITAL ENCOUNTER (OUTPATIENT)
Age: 57
Discharge: HOME | End: 2022-12-29
Payer: MEDICAID

## 2022-12-29 VITALS
WEIGHT: 130 LBS | HEART RATE: 83 BPM | DIASTOLIC BLOOD PRESSURE: 80 MMHG | TEMPERATURE: 100 F | RESPIRATION RATE: 16 BRPM | OXYGEN SATURATION: 100 % | BODY MASS INDEX: 23.04 KG/M2 | HEIGHT: 63 IN | SYSTOLIC BLOOD PRESSURE: 141 MMHG

## 2022-12-29 DIAGNOSIS — U07.1 COVID: Primary | ICD-10-CM

## 2022-12-29 DIAGNOSIS — J40 BRONCHITIS: ICD-10-CM

## 2022-12-29 PROCEDURE — 99214 OFFICE O/P EST MOD 30 MIN: CPT

## 2022-12-29 PROCEDURE — 71046 X-RAY EXAM CHEST 2 VIEWS: CPT | Performed by: PHYSICIAN ASSISTANT

## 2022-12-29 PROCEDURE — 94640 AIRWAY INHALATION TREATMENT: CPT

## 2022-12-29 RX ORDER — BENZONATATE 100 MG/1
CAPSULE ORAL 3 TIMES DAILY PRN
Qty: 30 CAPSULE | Refills: 0 | Status: SHIPPED | OUTPATIENT
Start: 2022-12-29 | End: 2023-01-28

## 2022-12-29 RX ORDER — ALBUTEROL SULFATE 90 UG/1
8 AEROSOL, METERED RESPIRATORY (INHALATION) ONCE
Status: COMPLETED | OUTPATIENT
Start: 2022-12-29 | End: 2022-12-29

## 2022-12-30 NOTE — DISCHARGE INSTRUCTIONS
Use the inhaler as needed for chest tightness, wheezing or bronchospasm. Take 1 to 2 puffs every 4-6 hours as needed. Tessalon Perles for cough management. Stay away from people for 5 full days starting from your positive test result. Strict indoor masking for the 5 days following quarantine. Over-the-counter cough and cold medication as needed for symptom management. Monitor your oxygen levels with a pulse oximeter. If your oxygen levels are less than 95% persistently go to the ER. If you develop fever that lasts longer than 5 days, chest pain or shortness of breath go to the ER. Follow up with your primary doctor. If you have new, changing or worsening symptoms, please go directly to the ER.

## 2023-01-10 ENCOUNTER — TELEPHONE (OUTPATIENT)
Dept: FAMILY MEDICINE CLINIC | Facility: CLINIC | Age: 58
End: 2023-01-10

## 2023-01-10 NOTE — TELEPHONE ENCOUNTER
Called patient who states she tested positive for covid on 12/23/22. Initially had dry cough then if became more congested. Continues to have cough mainly at night and early in the am when she gets up. Sometimes will have a spasm of coughing during the day. Was seen at [facility] on 12/29/22 and was given rx for inhaler and benzonatate. States pills didn't help with the cough that much and she is out of them. Uses inhaler if she feels any wheezing. Doesn't drink that much water. Advised to push fluids, warm tea w honey/lemon, HOB elevated at night with bedside humidifier, try Delsym cough syrup. Appt scheduled with Dr. Emma Schmitz tomorrow for cough only.     Future Appointments   Date Time Provider Abi Isi   1/11/2023 10:45 AM Cristiano Lisa MD [facility]   1/23/2023 10:40 AM Sulaiman Carter MD [facility]

## 2023-01-10 NOTE — TELEPHONE ENCOUNTER
Pt was Dx with Covid 3 weeks ago  , still has bad cough . Wanted in person visit sooner . Next available is not till 1/23. Please advice .

## 2023-01-11 ENCOUNTER — OFFICE VISIT (OUTPATIENT)
Dept: FAMILY MEDICINE CLINIC | Facility: CLINIC | Age: 58
End: 2023-01-11
Payer: MEDICAID

## 2023-01-11 VITALS
HEIGHT: 63 IN | SYSTOLIC BLOOD PRESSURE: 122 MMHG | TEMPERATURE: 98 F | WEIGHT: 129 LBS | DIASTOLIC BLOOD PRESSURE: 68 MMHG | HEART RATE: 94 BPM | OXYGEN SATURATION: 99 % | BODY MASS INDEX: 22.86 KG/M2 | RESPIRATION RATE: 18 BRPM

## 2023-01-11 DIAGNOSIS — J30.9 ALLERGIC RHINITIS, UNSPECIFIED SEASONALITY, UNSPECIFIED TRIGGER: Primary | ICD-10-CM

## 2023-01-11 PROCEDURE — 3074F SYST BP LT 130 MM HG: CPT | Performed by: FAMILY MEDICINE

## 2023-01-11 PROCEDURE — 99213 OFFICE O/P EST LOW 20 MIN: CPT | Performed by: FAMILY MEDICINE

## 2023-01-11 PROCEDURE — 3008F BODY MASS INDEX DOCD: CPT | Performed by: FAMILY MEDICINE

## 2023-01-11 PROCEDURE — 3078F DIAST BP <80 MM HG: CPT | Performed by: FAMILY MEDICINE

## 2023-01-11 RX ORDER — ALBUTEROL SULFATE 90 UG/1
2 AEROSOL, METERED RESPIRATORY (INHALATION) EVERY 4 HOURS PRN
COMMUNITY
Start: 2023-01-03

## 2023-01-11 RX ORDER — LORATADINE 10 MG/1
10 TABLET ORAL DAILY
Qty: 30 TABLET | Refills: 3 | Status: SHIPPED | OUTPATIENT
Start: 2023-01-11

## 2023-01-11 RX ORDER — FLUTICASONE PROPIONATE 50 MCG
2 SPRAY, SUSPENSION (ML) NASAL DAILY
Qty: 1 EACH | Refills: 3 | Status: SHIPPED | OUTPATIENT
Start: 2023-01-11

## 2023-03-19 ENCOUNTER — PATIENT MESSAGE (OUTPATIENT)
Dept: FAMILY MEDICINE CLINIC | Facility: CLINIC | Age: 58
End: 2023-03-19

## 2023-03-20 NOTE — TELEPHONE ENCOUNTER
From: Jose Katz  To: Remy Hudson MD  Sent: 3/19/2023 10:52 AM CDT  Subject: Switch pharmacy    Since [address] on [address] closed, I would like to switch my pharmacy to [address] [facility] [address] in [place].  Thank you

## 2023-04-28 ENCOUNTER — PATIENT MESSAGE (OUTPATIENT)
Dept: FAMILY MEDICINE CLINIC | Facility: CLINIC | Age: 58
End: 2023-04-28

## 2023-05-01 NOTE — TELEPHONE ENCOUNTER
Gout is a clinical diagnosis. Lab work is not needed for acute symptoms. Please schedule appointment with any available provider for evaluation and treatment. For now, patient can take ibuprofen 600 mg twice daily as needed, local ice. Elevate.   To urgent care if notes any worsening of symptoms

## 2023-08-02 ENCOUNTER — OFFICE VISIT (OUTPATIENT)
Dept: FAMILY MEDICINE CLINIC | Facility: CLINIC | Age: 58
End: 2023-08-02
Payer: MEDICAID

## 2023-08-02 VITALS
TEMPERATURE: 98 F | BODY MASS INDEX: 23.74 KG/M2 | RESPIRATION RATE: 18 BRPM | WEIGHT: 134 LBS | HEIGHT: 63 IN | HEART RATE: 71 BPM | SYSTOLIC BLOOD PRESSURE: 98 MMHG | OXYGEN SATURATION: 99 % | DIASTOLIC BLOOD PRESSURE: 60 MMHG

## 2023-08-02 DIAGNOSIS — L50.9 URTICARIA: ICD-10-CM

## 2023-08-02 DIAGNOSIS — K21.9 LARYNGOPHARYNGEAL REFLUX (LPR): ICD-10-CM

## 2023-08-02 DIAGNOSIS — Z00.00 ROUTINE PHYSICAL EXAMINATION: Primary | ICD-10-CM

## 2023-08-02 DIAGNOSIS — Z12.11 ENCOUNTER FOR SCREENING COLONOSCOPY: ICD-10-CM

## 2023-08-02 DIAGNOSIS — D72.818 OTHER DECREASED WHITE BLOOD CELL (WBC) COUNT: ICD-10-CM

## 2023-08-02 DIAGNOSIS — N60.11 FIBROCYSTIC CHANGE OF BREAST, RIGHT: ICD-10-CM

## 2023-08-02 DIAGNOSIS — N64.4 BREAST TENDERNESS IN FEMALE: ICD-10-CM

## 2023-08-02 DIAGNOSIS — Z83.71 FAMILY HISTORY OF POLYPS IN THE COLON: ICD-10-CM

## 2023-08-02 DIAGNOSIS — E78.49 OTHER HYPERLIPIDEMIA: ICD-10-CM

## 2023-08-02 DIAGNOSIS — R19.4 CHANGE IN BOWEL HABITS: ICD-10-CM

## 2023-08-02 DIAGNOSIS — I10 ESSENTIAL HYPERTENSION: ICD-10-CM

## 2023-08-02 DIAGNOSIS — Z51.81 ENCOUNTER FOR MEDICATION MONITORING: ICD-10-CM

## 2023-08-02 PROCEDURE — 3074F SYST BP LT 130 MM HG: CPT | Performed by: FAMILY MEDICINE

## 2023-08-02 PROCEDURE — 99213 OFFICE O/P EST LOW 20 MIN: CPT | Performed by: FAMILY MEDICINE

## 2023-08-02 PROCEDURE — 3078F DIAST BP <80 MM HG: CPT | Performed by: FAMILY MEDICINE

## 2023-08-02 PROCEDURE — 99396 PREV VISIT EST AGE 40-64: CPT | Performed by: FAMILY MEDICINE

## 2023-08-02 PROCEDURE — 3008F BODY MASS INDEX DOCD: CPT | Performed by: FAMILY MEDICINE

## 2023-08-02 RX ORDER — NICOTINE POLACRILEX 4 MG/1
20 GUM, CHEWING ORAL DAILY
Qty: 30 TABLET | Refills: 2 | Status: SHIPPED | OUTPATIENT
Start: 2023-08-02 | End: 2023-09-01

## 2023-08-02 NOTE — PATIENT INSTRUCTIONS
Decrease losartan to every other day and monitor blood pressures.   If consistent elevations greater than 135/85, resume daily    Vitamin D3 1000 units daily    FIber supplement (Benefiber or Metamucil) once daily    Ck labs in 4 months

## 2023-08-28 ENCOUNTER — HOSPITAL ENCOUNTER (OUTPATIENT)
Dept: MAMMOGRAPHY | Age: 58
Discharge: HOME | End: 2023-08-28
Attending: FAMILY MEDICINE
Payer: MEDICAID

## 2023-08-28 ENCOUNTER — HOSPITAL ENCOUNTER (OUTPATIENT)
Dept: ULTRASOUND IMAGING | Age: 58
Discharge: HOME | End: 2023-08-28
Attending: FAMILY MEDICINE
Payer: MEDICAID

## 2023-08-28 DIAGNOSIS — N60.11 FIBROCYSTIC CHANGE OF BREAST, RIGHT: ICD-10-CM

## 2023-08-28 DIAGNOSIS — N64.4 BREAST TENDERNESS IN FEMALE: ICD-10-CM

## 2023-08-28 PROCEDURE — 76642 ULTRASOUND BREAST LIMITED: CPT | Performed by: FAMILY MEDICINE

## 2023-08-28 PROCEDURE — 77062 BREAST TOMOSYNTHESIS BI: CPT | Performed by: FAMILY MEDICINE

## 2023-08-28 PROCEDURE — 77066 DX MAMMO INCL CAD BI: CPT | Performed by: FAMILY MEDICINE

## 2023-08-30 ENCOUNTER — OFFICE VISIT (OUTPATIENT)
Facility: LOCATION | Age: 58
End: 2023-08-30
Payer: MEDICAID

## 2023-08-30 DIAGNOSIS — Z80.0 ENCOUNTER FOR COLONOSCOPY IN PATIENT WITH FAMILY HISTORY OF COLON CANCER: ICD-10-CM

## 2023-08-30 DIAGNOSIS — R19.4 FREQUENT BOWEL MOVEMENTS: Primary | ICD-10-CM

## 2023-08-30 DIAGNOSIS — Z12.11 ENCOUNTER FOR COLONOSCOPY IN PATIENT WITH FAMILY HISTORY OF COLON CANCER: ICD-10-CM

## 2023-08-30 DIAGNOSIS — Z83.71 ENCOUNTER FOR COLONOSCOPY IN PATIENT WITH FAMILY HISTORY OF COLON POLYPS: ICD-10-CM

## 2023-08-30 DIAGNOSIS — Z12.11 ENCOUNTER FOR COLONOSCOPY IN PATIENT WITH FAMILY HISTORY OF COLON POLYPS: ICD-10-CM

## 2023-08-30 PROCEDURE — 99243 OFF/OP CNSLTJ NEW/EST LOW 30: CPT | Performed by: SURGERY

## 2023-08-30 RX ORDER — POLYETHYLENE GLYCOL 3350, SODIUM CHLORIDE, SODIUM BICARBONATE, POTASSIUM CHLORIDE 420; 11.2; 5.72; 1.48 G/4L; G/4L; G/4L; G/4L
POWDER, FOR SOLUTION ORAL
Qty: 1 EACH | Refills: 0 | Status: SHIPPED | OUTPATIENT
Start: 2023-08-30

## 2023-09-24 RX ORDER — OMEPRAZOLE 20 MG/1
20 CAPSULE, DELAYED RELEASE ORAL DAILY
COMMUNITY
Start: 2023-08-02

## 2023-10-04 LAB
ABSOLUTE BASOPHILS: 19 CELLS/UL (ref 0–200)
ABSOLUTE EOSINOPHILS: 90 CELLS/UL (ref 15–500)
ABSOLUTE LYMPHOCYTES: 1555 CELLS/UL (ref 850–3900)
ABSOLUTE MONOCYTES: 272 CELLS/UL (ref 200–950)
ABSOLUTE NEUTROPHILS: 1264 CELLS/UL (ref 1500–7800)
BASOPHILS: 0.6 %
BUN: 12 MG/DL (ref 7–25)
CALCIUM: 9.5 MG/DL (ref 8.6–10.4)
CARBON DIOXIDE: 29 MMOL/L (ref 20–32)
CHLORIDE: 104 MMOL/L (ref 98–110)
CHOL/HDLC RATIO: 3.5 (CALC)
CHOLESTEROL, TOTAL: 211 MG/DL
CREATININE: 0.75 MG/DL (ref 0.5–1.03)
EGFR: 93 ML/MIN/1.73M2
EOSINOPHILS: 2.8 %
GLUCOSE: 92 MG/DL (ref 65–99)
HDL CHOLESTEROL: 61 MG/DL
HEMATOCRIT: 42.9 % (ref 35–45)
HEMOGLOBIN: 14.3 G/DL (ref 11.7–15.5)
LDL-CHOLESTEROL: 128 MG/DL (CALC)
LYMPHOCYTES: 48.6 %
MCH: 29.9 PG (ref 27–33)
MCHC: 33.3 G/DL (ref 32–36)
MCV: 89.6 FL (ref 80–100)
MONOCYTES: 8.5 %
MPV: 11.7 FL (ref 7.5–12.5)
NEUTROPHILS: 39.5 %
NON-HDL CHOLESTEROL: 150 MG/DL (CALC)
PLATELET COUNT: 178 THOUSAND/UL (ref 140–400)
POTASSIUM: 4.6 MMOL/L (ref 3.5–5.3)
RDW: 12.4 % (ref 11–15)
RED BLOOD CELL COUNT: 4.79 MILLION/UL (ref 3.8–5.1)
SODIUM: 139 MMOL/L (ref 135–146)
TRIGLYCERIDES: 110 MG/DL
WHITE BLOOD CELL COUNT: 3.2 THOUSAND/UL (ref 3.8–10.8)

## 2023-10-06 ENCOUNTER — OFFICE VISIT (OUTPATIENT)
Dept: FAMILY MEDICINE CLINIC | Facility: CLINIC | Age: 58
End: 2023-10-06
Payer: MEDICAID

## 2023-10-06 VITALS
HEART RATE: 71 BPM | RESPIRATION RATE: 18 BRPM | DIASTOLIC BLOOD PRESSURE: 70 MMHG | TEMPERATURE: 97 F | WEIGHT: 136 LBS | SYSTOLIC BLOOD PRESSURE: 120 MMHG | OXYGEN SATURATION: 98 % | BODY MASS INDEX: 24.1 KG/M2 | HEIGHT: 63 IN

## 2023-10-06 DIAGNOSIS — K21.9 LARYNGOPHARYNGEAL REFLUX (LPR): ICD-10-CM

## 2023-10-06 DIAGNOSIS — E78.49 OTHER HYPERLIPIDEMIA: ICD-10-CM

## 2023-10-06 DIAGNOSIS — I10 ESSENTIAL HYPERTENSION: Primary | ICD-10-CM

## 2023-10-06 DIAGNOSIS — D70.8 OTHER NEUTROPENIA (HCC): ICD-10-CM

## 2023-10-06 DIAGNOSIS — M62.838 CERVICAL PARASPINAL MUSCLE SPASM: ICD-10-CM

## 2023-10-06 DIAGNOSIS — R19.4 CHANGE IN BOWEL HABITS: ICD-10-CM

## 2023-10-06 DIAGNOSIS — K62.5 RECTAL BLEEDING: ICD-10-CM

## 2023-10-06 DIAGNOSIS — R21 RASH: ICD-10-CM

## 2023-10-06 PROCEDURE — 99214 OFFICE O/P EST MOD 30 MIN: CPT | Performed by: FAMILY MEDICINE

## 2023-10-06 PROCEDURE — 3008F BODY MASS INDEX DOCD: CPT | Performed by: FAMILY MEDICINE

## 2023-10-06 PROCEDURE — 3078F DIAST BP <80 MM HG: CPT | Performed by: FAMILY MEDICINE

## 2023-10-06 PROCEDURE — 3074F SYST BP LT 130 MM HG: CPT | Performed by: FAMILY MEDICINE

## 2023-10-06 RX ORDER — TRIAMCINOLONE ACETONIDE 1 MG/G
CREAM TOPICAL
Qty: 30 G | Refills: 0 | Status: SHIPPED | OUTPATIENT
Start: 2023-10-06

## 2023-10-06 NOTE — PATIENT INSTRUCTIONS
Claritin 10 mg daily for 2 weeks, then daily as needed    Metamucil 2 tablespoons in 4 oz of fluid daily

## 2023-10-07 ENCOUNTER — HOSPITAL ENCOUNTER (EMERGENCY)
Age: 58
Discharge: HOME | End: 2023-10-07
Attending: EMERGENCY MEDICINE

## 2023-10-07 VITALS
RESPIRATION RATE: 17 BRPM | HEIGHT: 63 IN | TEMPERATURE: 97 F | SYSTOLIC BLOOD PRESSURE: 157 MMHG | OXYGEN SATURATION: 99 % | HEART RATE: 89 BPM | BODY MASS INDEX: 24.1 KG/M2 | WEIGHT: 136 LBS | DIASTOLIC BLOOD PRESSURE: 102 MMHG

## 2023-10-07 DIAGNOSIS — K62.5 RECTAL BLEEDING: Primary | ICD-10-CM

## 2023-10-07 LAB
ALBUMIN SERPL-MCNC: 3.9 G/DL (ref 3.4–5)
ALBUMIN/GLOB SERPL: 1.1 {RATIO} (ref 1–2)
ALP LIVER SERPL-CCNC: 57 U/L
ALT SERPL-CCNC: 24 U/L
ANION GAP SERPL CALC-SCNC: 4 MMOL/L (ref 0–18)
AST SERPL-CCNC: 12 U/L (ref 15–37)
BASOPHILS # BLD AUTO: 0.02 X10(3) UL (ref 0–0.2)
BASOPHILS NFR BLD AUTO: 0.5 %
BILIRUB SERPL-MCNC: 0.4 MG/DL (ref 0.1–2)
BUN BLD-MCNC: 14 MG/DL (ref 7–18)
CALCIUM BLD-MCNC: 9.1 MG/DL (ref 8.5–10.1)
CHLORIDE SERPL-SCNC: 108 MMOL/L (ref 98–112)
CO2 SERPL-SCNC: 27 MMOL/L (ref 21–32)
CREAT BLD-MCNC: 0.71 MG/DL
EGFRCR SERPLBLD CKD-EPI 2021: 99 ML/MIN/1.73M2 (ref 60–?)
EOSINOPHIL # BLD AUTO: 0.08 X10(3) UL (ref 0–0.7)
EOSINOPHIL NFR BLD AUTO: 1.9 %
ERYTHROCYTE [DISTWIDTH] IN BLOOD BY AUTOMATED COUNT: 12.9 %
GLOBULIN PLAS-MCNC: 3.5 G/DL (ref 2.8–4.4)
GLUCOSE BLD-MCNC: 109 MG/DL (ref 70–99)
HCT VFR BLD AUTO: 43.6 %
HGB BLD-MCNC: 14.4 G/DL
IMM GRANULOCYTES # BLD AUTO: 0.01 X10(3) UL (ref 0–1)
IMM GRANULOCYTES NFR BLD: 0.2 %
LYMPHOCYTES # BLD AUTO: 1.77 X10(3) UL (ref 1–4)
LYMPHOCYTES NFR BLD AUTO: 41 %
MCH RBC QN AUTO: 29.4 PG (ref 26–34)
MCHC RBC AUTO-ENTMCNC: 33 G/DL (ref 31–37)
MCV RBC AUTO: 89.2 FL
MONOCYTES # BLD AUTO: 0.44 X10(3) UL (ref 0.1–1)
MONOCYTES NFR BLD AUTO: 10.2 %
NEUTROPHILS # BLD AUTO: 2 X10 (3) UL (ref 1.5–7.7)
NEUTROPHILS # BLD AUTO: 2 X10(3) UL (ref 1.5–7.7)
NEUTROPHILS NFR BLD AUTO: 46.2 %
OSMOLALITY SERPL CALC.SUM OF ELEC: 289 MOSM/KG (ref 275–295)
PLATELET # BLD AUTO: 191 10(3)UL (ref 150–450)
POTASSIUM SERPL-SCNC: 3.8 MMOL/L (ref 3.5–5.1)
PROT SERPL-MCNC: 7.4 G/DL (ref 6.4–8.2)
RBC # BLD AUTO: 4.89 X10(6)UL
SODIUM SERPL-SCNC: 139 MMOL/L (ref 136–145)
WBC # BLD AUTO: 4.3 X10(3) UL (ref 4–11)

## 2023-10-07 PROCEDURE — 36415 COLL VENOUS BLD VENIPUNCTURE: CPT

## 2023-10-07 PROCEDURE — 80053 COMPREHEN METABOLIC PANEL: CPT | Performed by: PHYSICIAN ASSISTANT

## 2023-10-07 PROCEDURE — 99283 EMERGENCY DEPT VISIT LOW MDM: CPT

## 2023-10-07 PROCEDURE — 85025 COMPLETE CBC W/AUTO DIFF WBC: CPT | Performed by: PHYSICIAN ASSISTANT

## 2023-10-07 NOTE — ED PROVIDER NOTES
Patient tells me that for about a month now, after having a bowel movement, she would notice some bright red blood on the tissue paper. It had always been just a small spot of bright red blood. The stool is not black. There is no blood mixed in with the stool. She had spoken to her primary care doctor about this. She was referred to Dr. Kwesi Mccollum. She has a colonoscopy scheduled for February. Patient tells me that she had a bowel movement today, again without black or grossly bloody stool but this was followed by quite a bit of bright red blood. No abdominal pain. No urinary symptoms or flank pain. Patient notes that she had been having bowel movements once or twice a day but recently her bowel movements have been about 4 times a day  No bleeding issues elsewhere    On examination, this is a very pleasant adult woman who appears in no extraordinary distress  Eyes sclera white  Abdomen is completely benign, soft, nondistended, nontender  Rectal examination per physician assistant    CBC shows normal white count and hemoglobin 14.4 with adequate platelets    I suspect with the bright red blood surrounding tissue paper that the patient's GI bleed is lower  Hemorrhoids, polyps, fissures all include the differential  Certainly, investigation with colonoscopy is reasonable to confirm the source of the bleeding. With her hemoglobin being normal, I do not believe this is emergent      I recommend she contact Dr. Kwesi Mccollum on Monday    I recommend a light bland diet with plenty of fluids. I recommend patient increase fiber in her diet with a fiber supplement like Metamucil or Citrucel as well as a stool softener like Colace to ensure effortless bowel movements. If she were to have any extraordinary symptoms such as more heavy bleeding, lightheadedness, chest pain, shortness of breath, palpitations, would recommend immediate reassessment.   Patient agreed with the plan    I provided a substantive portion of care for this patient. I personally performed the medical decision making for this encounter.

## 2023-10-07 NOTE — ED INITIAL ASSESSMENT (HPI)
Pt reports when she has a BM she notices small amounts of blood when she wipes. Pt states this has been going on for a month. Pt states the doctor told her to start taking metamucil but she has not. Unsure if she has hemorrhoids or not. Has colonoscopy scheduled for February.

## 2023-10-09 ENCOUNTER — TELEPHONE (OUTPATIENT)
Dept: FAMILY MEDICINE CLINIC | Facility: CLINIC | Age: 58
End: 2023-10-09

## 2023-10-09 ENCOUNTER — TELEPHONE (OUTPATIENT)
Facility: LOCATION | Age: 58
End: 2023-10-09

## 2023-10-09 NOTE — TELEPHONE ENCOUNTER
Zeenat from Dr Hand's office is calling because the patient is experiencing increased rectal bleeding. She went to the ER, it's not acute. She seems stable per the ER notes. She's concerned and is trying to get a sooner cscope procedure date, but ES is booked out and [facility] doesn't take her insurance.   The nurse is wondering if someone can call and reassure her about her rectal bleeding.     Please advise  Best callback number is [phone]  Best callback for Nurse Epperson is [phone]

## 2023-10-09 NOTE — TELEPHONE ENCOUNTER
Patient went to Er 10/7/23 for rectal bleeding   was told to give our office a call and check  if staff can schedule colonoscopy sooner  2/8/24 . Patient prefers female provider .

## 2023-10-09 NOTE — TELEPHONE ENCOUNTER
Called Dr. Hernandez Sick office to see if patient's colonoscopy with Dr. Isaac Hernandez can be moved up any sooner than 02/2024. They verified with surgery schedulers the patient can only have it done at [facility] due to her insurance. Only sooner appts are at [facility] and they do not accept patient's insurance. Requested Dr. Mary Azevedo nurse contact patient to discuss increase in rectal bleeding and recent ED visit.  will give message to Dr. Mary Azevedo nurse. Triage number given for any additional questions. Dr. Sascha Kyle  Is appt tomorrow or Wednesday OK for F/U    MDM:  Patient remained stable and well-appearing throughout visit in the emergency department. Lab work reassuring. No evidence of emergent process today, hemoglobin stable, patient well-appearing, patient safe for discharge home today with close follow-up with primary care and advised to return to the ER for new or worsening symptoms. Given information for conservative care at home. Would be beneficial to move up her GI appointment and she will discuss this with her doctor on Monday. Patient agreeable to treatment plan and follow-up, all questions answered prior to discharge. Diagnosis:      ICD-10-CM     1.  Rectal bleeding  K62.5        llow Up with:  Maria E Schwartz MD     Follow up  Follow-up with primary care in 2 to 3 days and return to ER for new or worsening symptoms

## 2023-10-10 NOTE — TELEPHONE ENCOUNTER
S/w pt.  She states she has been very stressed out and concerned about the bleeding she had when she went to the ER because \"it was way to much\" .  She denies any other symptoms.  Having regular bowel movements.  Is concerned d/t family hx and increased bleeding.  Reviewed internal hemorrhoids with pt and bleeding.  She verbalized understanding however is asking for any further advise since there is no cscope openings until after 1st of year.  Pt is very anxious and says now she's having problems with her b/p because of it.    Please advise. Thank  you

## 2023-10-11 ENCOUNTER — TELEPHONE (OUTPATIENT)
Facility: LOCATION | Age: 58
End: 2023-10-11

## 2023-10-11 DIAGNOSIS — Z12.11 ENCOUNTER FOR COLONOSCOPY IN PATIENT WITH FAMILY HISTORY OF COLON POLYPS: Primary | ICD-10-CM

## 2023-10-11 DIAGNOSIS — Z83.719 ENCOUNTER FOR COLONOSCOPY IN PATIENT WITH FAMILY HISTORY OF COLON POLYPS: Primary | ICD-10-CM

## 2023-10-11 NOTE — TELEPHONE ENCOUNTER
S/w patient about moving colonoscopy date to 10/19/23. Pt agreeable to change. Reviewed prep instructions. Verbalized understanding.

## 2023-10-12 ENCOUNTER — OFFICE VISIT (OUTPATIENT)
Dept: HEMATOLOGY/ONCOLOGY | Facility: HOSPITAL | Age: 58
End: 2023-10-12
Attending: INTERNAL MEDICINE
Payer: MEDICAID

## 2023-10-12 VITALS
HEART RATE: 83 BPM | SYSTOLIC BLOOD PRESSURE: 129 MMHG | DIASTOLIC BLOOD PRESSURE: 82 MMHG | OXYGEN SATURATION: 99 % | BODY MASS INDEX: 24 KG/M2 | WEIGHT: 136.38 LBS | TEMPERATURE: 98 F

## 2023-10-12 DIAGNOSIS — R79.89 ABNORMAL CBC: Primary | ICD-10-CM

## 2023-10-12 LAB
FOLATE SERPL-MCNC: 19.7 NG/ML (ref 8.7–?)
VIT B12 SERPL-MCNC: 320 PG/ML (ref 193–986)

## 2023-10-12 PROCEDURE — 99242 OFF/OP CONSLTJ NEW/EST SF 20: CPT | Performed by: INTERNAL MEDICINE

## 2023-10-12 NOTE — PROGRESS NOTES
Education Record    Learner:  Patient    Disease / Isai Silvano labs     Barriers / Limitations:  None   Comments:    Method:  Discussion   Comments:    General Topics:  Plan of care reviewed   Comments:    Outcome:  Shows understanding   Comments:    Patient here as a new consult. Energy levels are low. States she had some rectal bleeding which is being managed by GI. Having generalized muscle and joint aches. Denies any fevers, nausea, night sweats, or unintentional weight loss. Cimetidine Pregnancy And Lactation Text: This medication is Pregnancy Category B and is considered safe during pregnancy. It is also excreted in breast milk and breast feeding isn't recommended.

## 2023-10-13 DIAGNOSIS — I10 ESSENTIAL HYPERTENSION: ICD-10-CM

## 2023-10-13 LAB
DSDNA IGG SERPL IA-ACNC: 0.8 IU/ML
ENA AB SER QL IA: 0.3 UG/L
ENA AB SER QL IA: NEGATIVE

## 2023-10-16 RX ORDER — LOSARTAN POTASSIUM 25 MG/1
TABLET ORAL
Qty: 90 TABLET | Refills: 0 | Status: SHIPPED | OUTPATIENT
Start: 2023-10-16

## 2023-10-16 NOTE — TELEPHONE ENCOUNTER
Hypertension Medications Protocol Kupool34/13/2023 01:01 PM   Protocol Details CMP or BMP in past 12 months    Last serum creatinine< 2.0    Appointment in past 6 or next 3 months        LOV  10/6/23      LAST LAB 10/12/23     LAST RX  7/11/23 90     Next OV No future appointments.       PROTOCOL pass

## 2023-10-18 ENCOUNTER — ANESTHESIA EVENT (OUTPATIENT)
Dept: ENDOSCOPY | Facility: HOSPITAL | Age: 58
End: 2023-10-18
Payer: MEDICAID

## 2023-10-19 ENCOUNTER — HOSPITAL ENCOUNTER (OUTPATIENT)
Facility: HOSPITAL | Age: 58
Setting detail: HOSPITAL OUTPATIENT SURGERY
Discharge: HOME | End: 2023-10-19
Attending: SURGERY | Admitting: SURGERY
Payer: MEDICAID

## 2023-10-19 ENCOUNTER — TELEPHONE (OUTPATIENT)
Facility: LOCATION | Age: 58
End: 2023-10-19

## 2023-10-19 ENCOUNTER — PATIENT OUTREACH (OUTPATIENT)
Facility: LOCATION | Age: 58
End: 2023-10-19

## 2023-10-19 ENCOUNTER — ANESTHESIA (OUTPATIENT)
Dept: ENDOSCOPY | Facility: HOSPITAL | Age: 58
End: 2023-10-19
Payer: MEDICAID

## 2023-10-19 VITALS
OXYGEN SATURATION: 100 % | DIASTOLIC BLOOD PRESSURE: 85 MMHG | TEMPERATURE: 98 F | SYSTOLIC BLOOD PRESSURE: 142 MMHG | HEIGHT: 63 IN | HEART RATE: 71 BPM | WEIGHT: 136 LBS | RESPIRATION RATE: 18 BRPM | BODY MASS INDEX: 24.1 KG/M2

## 2023-10-19 DIAGNOSIS — Z12.11 ENCOUNTER FOR COLONOSCOPY IN PATIENT WITH FAMILY HISTORY OF COLON CANCER: ICD-10-CM

## 2023-10-19 DIAGNOSIS — Z80.0 ENCOUNTER FOR COLONOSCOPY IN PATIENT WITH FAMILY HISTORY OF COLON CANCER: ICD-10-CM

## 2023-10-19 DIAGNOSIS — Z83.719 ENCOUNTER FOR COLONOSCOPY IN PATIENT WITH FAMILY HISTORY OF COLON POLYPS: ICD-10-CM

## 2023-10-19 DIAGNOSIS — Z12.11 ENCOUNTER FOR COLONOSCOPY IN PATIENT WITH FAMILY HISTORY OF COLON POLYPS: ICD-10-CM

## 2023-10-19 PROCEDURE — 0DJD8ZZ INSPECTION OF LOWER INTESTINAL TRACT, VIA NATURAL OR ARTIFICIAL OPENING ENDOSCOPIC: ICD-10-PCS | Performed by: SURGERY

## 2023-10-19 RX ORDER — SODIUM CHLORIDE, SODIUM LACTATE, POTASSIUM CHLORIDE, CALCIUM CHLORIDE 600; 310; 30; 20 MG/100ML; MG/100ML; MG/100ML; MG/100ML
INJECTION, SOLUTION INTRAVENOUS CONTINUOUS
Status: DISCONTINUED | OUTPATIENT
Start: 2023-10-19 | End: 2023-10-19

## 2023-10-19 RX ORDER — MIDAZOLAM HYDROCHLORIDE 1 MG/ML
INJECTION INTRAMUSCULAR; INTRAVENOUS AS NEEDED
Status: DISCONTINUED | OUTPATIENT
Start: 2023-10-19 | End: 2023-10-19 | Stop reason: SURG

## 2023-10-19 RX ORDER — ONDANSETRON 2 MG/ML
4 INJECTION INTRAMUSCULAR; INTRAVENOUS EVERY 6 HOURS PRN
Status: DISCONTINUED | OUTPATIENT
Start: 2023-10-19 | End: 2023-10-19

## 2023-10-19 RX ORDER — LIDOCAINE HYDROCHLORIDE 10 MG/ML
INJECTION, SOLUTION EPIDURAL; INFILTRATION; INTRACAUDAL; PERINEURAL AS NEEDED
Status: DISCONTINUED | OUTPATIENT
Start: 2023-10-19 | End: 2023-10-19 | Stop reason: SURG

## 2023-10-19 RX ORDER — PROCHLORPERAZINE EDISYLATE 5 MG/ML
5 INJECTION INTRAMUSCULAR; INTRAVENOUS EVERY 8 HOURS PRN
Status: DISCONTINUED | OUTPATIENT
Start: 2023-10-19 | End: 2023-10-19

## 2023-10-19 RX ADMIN — MIDAZOLAM HYDROCHLORIDE 2 MG: 1 INJECTION INTRAMUSCULAR; INTRAVENOUS at 07:18:00

## 2023-10-19 RX ADMIN — LIDOCAINE HYDROCHLORIDE 50 MG: 10 INJECTION, SOLUTION EPIDURAL; INFILTRATION; INTRACAUDAL; PERINEURAL at 07:13:00

## 2023-10-19 RX ADMIN — SODIUM CHLORIDE, SODIUM LACTATE, POTASSIUM CHLORIDE, CALCIUM CHLORIDE: 600; 310; 30; 20 INJECTION, SOLUTION INTRAVENOUS at 07:09:00

## 2023-10-19 RX ADMIN — SODIUM CHLORIDE, SODIUM LACTATE, POTASSIUM CHLORIDE, CALCIUM CHLORIDE: 600; 310; 30; 20 INJECTION, SOLUTION INTRAVENOUS at 07:37:00

## 2023-10-19 NOTE — ANESTHESIA POSTPROCEDURE EVALUATION
Nithya Lanza 79 Patient Status:  Hospital Outpatient Surgery   Age/Gender 62year old female MRN [id number]   Location [address] [address] Attending Josph Baumgarten, MD   [facility] Day # 0 PCP Jorge Vilchis MD       Anesthesia Post-op Note    COLONOSCOPY    Procedure Summary       Date: 10/19/23 Room / Location: [facility] ENDOSCOPY 03 / [facility] ENDOSCOPY    Anesthesia Start: 0709 Anesthesia Stop: 9989    Procedure: COLONOSCOPY Diagnosis:       Encounter for colonoscopy in patient with family history of colon polyps      Encounter for colonoscopy in patient with family history of colon cancer      (internal hemorrhoids, diverticulosis)    Surgeons: Josph Baumgarten, MD Anesthesiologist: Matt Quintanilla MD    Anesthesia Type: MAC ASA Status: 2            Anesthesia Type: MAC    Vitals Value Taken Time   BP 93/70 10/19/23 0734   Temp 97.5 10/19/23 0737   Pulse 82 10/19/23 0737   Resp 14 10/19/23 0737   SpO2 98 % 10/19/23 0737   Vitals shown include unfiled device data. Patient Location: Endoscopy    Anesthesia Type: MAC    Airway Patency: patent    Postop Pain Control: adequate    Mental Status: preanesthetic baseline    Nausea/Vomiting: none    Cardiopulmonary/Hydration status: stable euvolemic    Complications: no apparent anesthesia related complications    Postop vital signs: stable    Dental Exam: Unchanged from Preop    Patient to be discharged from PACU when criteria met.

## 2023-10-19 NOTE — TELEPHONE ENCOUNTER
Transaction ID: [id number] ID: [id number] Date: 2023-10-19  Natty Dailey Patient  Member ID  [id number]    Date of Birth  1965-11-09    Gender  NA    Transaction Type  Outpatient Authorization    Organization  [address] [place] [address]    [address] logo  Certificate Information  Certification Number  [id number]    Status  CERTIFIED IN TOTAL    Service Information  Service Type  2 - Surgical    Place of Service  25 - [facility]    Service From - To Date  2023-10-19 - 2023-12-31    Diagnosis Code 1  O285 - Family history of malignant neoplasm of digestive organs    Diagnosis Code 2  V71801 - Family history of colon polyps unspecified    Procedure Code 1 (CPT/HCPCS)  25977 - DIAGNOSTIC COLONOSCOPY    Quantity  1 Units    Status  CERTIFIED IN TOTAL    Rendering Providers     Provider 1  Name  Nicki Flynn    NPI  [id number]    Provider Role  Attending    Address  [address]    Provider 2  [facility]    NPI  [id number]    Provider Role  Provider Organization    Address  [address] [place] [address]

## 2023-10-19 NOTE — H&P
History & Physical Examination    Patient Name: Soni Ford  MRN: [id number]  [facility]: [id number]  YOB: 1965    Diagnosis: Frequent BM, family history of colon polyps and colon cancer    Present Illness: The patient is a 80-year-old female seen at the request of her primary care physician regarding the need for colonoscopy. The patient states she she does not have issues with constipation. She does have issues with frequent bowel movements. She was to move her bowels once a day, after drinking coffee. Now, she moves her bowels after she eats, approximately 4 times per day. She also has noticed increasing flatulence from her abdomen. She feels her stomach is more pretreatment. Her only recent change was that she started menopause. She denies any change in her diet or change in her medications. She also has a family history of abdominal cancer (possibly colon), in her paternal aunt and a female paternal cousin, who are both in their late 46s at the time of diagnosis. Her mother had polyps. losartan 25 MG Oral Tab, TAKE 1 TABLET BY MOUTH EVERY DAY, Disp: 90 tablet, Rfl: 0, 10/18/2023  Ergocalciferol (VITAMIN D OR), Take by mouth daily. , Disp: , Rfl: , Past Week  PEG 3350-KCl-Na Bicarb-NaCl (TRILYTE) 420 g Oral Recon Soln, Starting at 4:00 pm the night before procedure, drink 8 ounces of the prep every 15-20 minutes until finished, Disp: 1 each, Rfl: 0, 10/18/2023  triamcinolone 0.1 % External Cream, Apply to aa bid x 1 week, then stop. May repeat after one week as needed as directed (Patient not taking: Reported on 10/12/2023), Disp: 30 g, Rfl: 0, More than a month  omeprazole 20 MG Oral Capsule Delayed Release, Take 1 capsule (20 mg total) by mouth daily. (Patient not taking: Reported on 10/7/2023), Disp: , Rfl: , More than a month  albuterol 108 (90 Base) MCG/ACT Inhalation Aero Soln, Inhale 2 puffs into the lungs every 4 (four) hours as needed.  (Patient not taking: Reported on 10/12/2023), Disp: , Rfl: , More than a month  loratadine (CLARITIN) 10 MG Oral Tab, Take 1 tablet (10 mg total) by mouth daily. (Patient not taking: Reported on 10/12/2023), Disp: 30 tablet, Rfl: 3, More than a month  fluticasone propionate 50 MCG/ACT Nasal Suspension, 2 sprays by Each Nare route daily. (Patient not taking: Reported on 10/7/2023), Disp: 1 each, Rfl: 3, More than a month      lactated ringers infusion, , Intravenous, Continuous        Allergies: No Known Allergies    Past Medical History:   Diagnosis Date    Chronic cough     Encounter for IUD insertion 2021    Removed 10/2022    Essential hypertension     High blood pressure     Hx of motion sickness     Vertigo      Past Surgical History:   Procedure Laterality Date    ENDOMETRIAL BIOPSY - JAR(S): 2  2021    Benign    MIRENA, IUD, 5 YEAR  2021    NEEDLE BIOPSY RIGHT  2009    stereo bx dense tissue          TONSILLECTOMY       Family History   Problem Relation Age of Onset    Colon Polyps Mother     Renal Disease Father          from CRF    Other (kidney failure) Father     Colon Cancer Paternal Aunt 62    Colon Cancer Paternal Cousin Female 62     Social History    Tobacco Use      Smoking status: Never      Smokeless tobacco: Never    Alcohol use: No      Alcohol/week: 0.0 standard drinks of alcohol      SYSTEM Check if Review is Normal Check if Physical Exam is Normal If not normal, please explain:   HEENT [x ] [x ]    NECK & BACK [x ] [ x]    HEART [x ] [ x]    LUNGS [x ] [ x]    ABDOMEN [ ] [x ]    UROGENITAL [ x] [x ]    EXTREMITIES [ x] [x ]    OTHER        [ x ] I have discussed the risks and benefits and alternatives with the patient/family. They understand and agree to proceed with plan of care. [ x ] I have reviewed the History and Physical done within the last 30 days. Any changes noted above.     Yumi Vick MD  10/19/2023  7:10 AM

## 2023-10-19 NOTE — OPERATIVE REPORT
[facility]                                                                                              Colonoscopy Operative Report    Frances Charlton Patient Status:  Hospital Outpatient Surgery    1965 MRN [id number]   Location [address] [address] Attending Gabriella Bolanos MD   Hosp Day #   0 PCP Celia Andre MD     Date of Operation:  10/19/2023     Pre-Operative Diagnosis: Encounter for colonoscopy in patient with family history of colon polyps [Z12.11, Z83.71]  Encounter for colonoscopy in patient with family history of colon cancer [Z12.11, Z80.0]    Post-Operative Diagnosis: Diverticulosis and internal hemorrhoids    Procedure Performed: Procedure(s):  COLONOSCOPY    Informed Consent: Informed consent for both the procedure and sedation were obtained from the patient. The potentially life-threatening complications of sedation, bleeding,  perforation, transfusion or repeat endoscopy  were reviewed along with the possible need for hospitalization, surgical management, transfusion or repeat endoscopy should one of these complications arise. The patient understands and is agreeable to proceed. Sedation Type: MAC-Patient received sedation with monitored anesthesia provided by an anesthesiologist  Date of previous colonoscopy: No known previous exam    Procedure Description: The patient was placed in the left lateral decubitus position. After careful digital rectal examination, the Adult colonoscope was inserted into the rectum and advanced to the level of the cecum under direct visualization. The cecum was identified by landmarks, including the appendiceal orifice and ileoceccal valve. Careful examination of the entire colon was performed during withdrawal of the endoscope. The scope was withdrawn to the rectum and retroflexion was performed.   The patient tolerated the procedure well with no immediate complications. The patient was transferred to the recovery area in stable condition. Quality of Preparation: Adequate  Huddy Bowel Prep Score:        Right Colon: 2        Transverse Colon: 3        Left Colon: 3  Total Score:  8    Findings: The patient had a tortuous colon. She has scattered diverticulum in the sigmoid colon. She had moderate internal hemorrhoids on retroflexion. She had no polyps or other lesions. Recommendations: Due to her family history of colon cancer, I would recommend a repeat colonoscopy in 5 years.   Jitendra Camejo MD  10/19/2023  7:35 AM

## 2023-10-20 LAB — COPPER: 104 UG/DL

## 2023-11-13 ENCOUNTER — PATIENT MESSAGE (OUTPATIENT)
Dept: FAMILY MEDICINE CLINIC | Facility: CLINIC | Age: 58
End: 2023-11-13

## 2023-11-13 DIAGNOSIS — M25.512 CHRONIC LEFT SHOULDER PAIN: ICD-10-CM

## 2023-11-13 DIAGNOSIS — M62.838 CERVICAL PARASPINAL MUSCLE SPASM: ICD-10-CM

## 2023-11-13 DIAGNOSIS — G89.29 CHRONIC LEFT SHOULDER PAIN: ICD-10-CM

## 2023-11-13 DIAGNOSIS — M54.2 NECK PAIN: Primary | ICD-10-CM

## 2023-11-14 NOTE — TELEPHONE ENCOUNTER
From: Eliseo Conner  To: Dany Orourke  Sent: 11/13/2023 1:41 PM CST  Subject: PT for my shoulder    Salam Dr Kamilla Gray  In my physical exam I mentioned that my neck and shoulder are hurting me and we thought maybe because of stress or a tight muscle. My shoulder and upper arm are hurting every time I move it a certain way. We discussed physical therapy but I wanted to deal with my colonoscopy first. Now that this is out of the way, I want to treat my shoulder. Please advise to what is the next step. Thanks!

## 2023-11-16 ENCOUNTER — TELEPHONE (OUTPATIENT)
Dept: FAMILY MEDICINE CLINIC | Facility: CLINIC | Age: 58
End: 2023-11-16

## 2023-11-16 NOTE — TELEPHONE ENCOUNTER
Pt called asking for the phone # of Physical Therapy & asked what the order stated. It shows concentration on the Spine. Pt said that is wrong it should be Neck, Collar Bone, shoulder, arm. Pt does not want to schedule PT until she understand what it should be for.

## 2023-11-17 NOTE — TELEPHONE ENCOUNTER
Please explain to pt neck is considered part of the spine. I entered all dx pertaining to sxs including her shoulder pain. Also noted Referral status is OPEN.  Pls make sure it gets AUTH

## 2023-11-20 NOTE — TELEPHONE ENCOUNTER
Called patient and explained that for PT \"SPINE\" is more a general category indicating where the problem may be originating. The diagnoses attached are more specific to the patient's symptoms. Verbalizes understanding. States the PT department called to schedule appts and explained this also.     Future Appointments   Date Time Provider Abi Snowden   12/14/2023  2:45 PM Jamila Franco, PT [facility]PT [facility]   12/21/2023  1:00 PM Jamila Franco, PT [facility]PT [facility]   12/26/2023 10:00 AM Jamila Franco PT [facility]   12/28/2023 10:15 AM Joselin Justin, PT SNPT [facility]   1/2/2024 11:00 AM Joselin Justin, PT [facility]

## 2023-12-03 DIAGNOSIS — I10 ESSENTIAL HYPERTENSION: ICD-10-CM

## 2023-12-04 RX ORDER — LOSARTAN POTASSIUM 25 MG/1
TABLET ORAL
Qty: 90 TABLET | Refills: 0 | OUTPATIENT
Start: 2023-12-04

## 2023-12-07 DIAGNOSIS — I10 ESSENTIAL HYPERTENSION: ICD-10-CM

## 2023-12-08 RX ORDER — LOSARTAN POTASSIUM 25 MG/1
TABLET ORAL
Qty: 90 TABLET | Refills: 0 | OUTPATIENT
Start: 2023-12-08

## 2023-12-12 ENCOUNTER — PATIENT MESSAGE (OUTPATIENT)
Dept: FAMILY MEDICINE CLINIC | Facility: CLINIC | Age: 58
End: 2023-12-12

## 2023-12-13 DIAGNOSIS — I10 ESSENTIAL HYPERTENSION: ICD-10-CM

## 2023-12-13 RX ORDER — LOSARTAN POTASSIUM 25 MG/1
TABLET ORAL
Qty: 90 TABLET | Refills: 0 | Status: SHIPPED | OUTPATIENT
Start: 2023-12-13

## 2023-12-13 NOTE — TELEPHONE ENCOUNTER
Hypertension Medications Protocol Rxmygc8912/13/2023 11:13 AM   Protocol Details CMP or BMP in past 12 months    Last serum creatinine< 2.0    Appointment in past 6 or next 3 months        Future Appointments   Date Time Provider Abi Snowden   12/14/2023  2:45 PM France Munoz, PT SNPT [facility]   12/21/2023  1:00 PM Yoni Carver [facility] Lizbeth   12/26/2023 10:00 AM France Munoz, PT [facility]    12/28/2023 10:15 AM Tresia Radha, PT SNPT [facility]   1/2/2024 11:00 AM Tresia Radha, PT SNPT [facility]     Patient is traveling to [place] in January .  90 days sent to [facility]

## 2023-12-14 ENCOUNTER — OFFICE VISIT (OUTPATIENT)
Dept: PHYSICAL THERAPY | Age: 58
End: 2023-12-14
Attending: FAMILY MEDICINE
Payer: MEDICAID

## 2023-12-14 DIAGNOSIS — G89.29 CHRONIC LEFT SHOULDER PAIN: ICD-10-CM

## 2023-12-14 DIAGNOSIS — M54.2 NECK PAIN: Primary | ICD-10-CM

## 2023-12-14 DIAGNOSIS — M25.512 CHRONIC LEFT SHOULDER PAIN: ICD-10-CM

## 2023-12-14 DIAGNOSIS — M62.838 CERVICAL PARASPINAL MUSCLE SPASM: ICD-10-CM

## 2023-12-14 PROCEDURE — 97161 PT EVAL LOW COMPLEX 20 MIN: CPT

## 2023-12-14 PROCEDURE — 97110 THERAPEUTIC EXERCISES: CPT

## 2023-12-14 NOTE — PATIENT INSTRUCTIONS
Access Code: [email or web address]  URL: [email or web address]  Date: 12/14/2023  Prepared by: Venecia Miller    Exercises  - Corner Pec Major Stretch  - 2-3 x daily - 7 x weekly - 1 sets - 3 reps - 10 sec hold  - Standing Shoulder Row with Anchored Resistance  - 2-3 x daily - 7 x weekly - 1 sets - 15 reps - 3 sec hold  - Wall Push Up  - 2-3 x daily - 7 x weekly - 1 sets - 10 reps - 2 sec hold  - Supine Cross Body Shoulder Stretch  - 2-3 x daily - 7 x weekly - 1 sets - 3 reps - 10 sec hold

## 2023-12-21 ENCOUNTER — OFFICE VISIT (OUTPATIENT)
Dept: PHYSICAL THERAPY | Age: 58
End: 2023-12-21
Attending: FAMILY MEDICINE
Payer: MEDICAID

## 2023-12-21 PROCEDURE — 97140 MANUAL THERAPY 1/> REGIONS: CPT

## 2023-12-21 PROCEDURE — 97110 THERAPEUTIC EXERCISES: CPT

## 2023-12-21 NOTE — PROGRESS NOTES
Diagnosis:   Neck pain (M54.2)  Cervical paraspinal muscle spasm (S87.688)  Chronic left shoulder pain (M25.512,G89.29)      Referring Provider: Nuvia Cortez  Date of Evaluation:    12/14/2023    Precautions:  None Next MD visit:   none scheduled  Date of Surgery: n/a   Insurance Primary/Secondary: BLUE CROSS MEDICAID / N/A     # Auth Visits: 6 visits            Subjective: no new problems. States that she is working on her HEP but feels she is having a little more pain in the shoulder. The exercises don't bother her but feels she has more pain when she moves her left arm since last visit. Pain: 0/10      Objective:   cervical AROM: (* denotes performed with pain)  Flexion: 45 deg  Extension: 45 deg  Sidebending: R 35 deg; L 35 deg  Rotation: R 70 deg; L 70 deg     AROM: (* denotes performed with pain)  Shoulder  Elbow   Flexion: R 170 deg; L 170 deg  Abduction: R 180 deg; L 170 deg  ER: R 90 deg; L 48 deg*  IR: R T7; L T8* WNL    Shoulder PROM: full all planes    Assessment: completed todays treatment without c/o increased pain or discomfort following treatment. Reviewed HEP and pt required verbal and tactile cues for proper performance of wall press ups and scapular rows. Performs correctly following cues. Shoulder pain reproduced with Shoulder ER in neutral and ABD which improved following GHJ mobilizations. Worked on scapular stabilization with good tolerance. Pain with ER improved following treatment.  Advised pt to use cold pack 2-3x/day      Goals:    (to be met in 8 visits)     Pt will improve shoulder abduction AROM to 180 degrees to improve ability to don deodorant, don/doff shirts, and wash hair   Pt will increase shoulder AROM ER to 90 deg to be able to reach and fasten seatbelt   Pt will increase shoulder AROM IR to T7 pain free to be able to reach in back pocket, tuck in shirt, and turn steering wheel without pain  Pt will improve shoulder strength throughout to 5/5 to improve function with carrying groceries   Pt will demonstrate increased mid/low trap strength to 4+/5 to promote improved shoulder mechanics and stabilization with lifting and reaching   Pt will be independent and compliant with comprehensive HEP to maintain progress achieved in PT    Plan: continue POC progress as tolerated  Date: 12/21/2023  TX#: 2/6 Date:                 TX#: 3/ Date:                 TX#: 4/ Date:                 TX#: 5/ Date:    Tx#: 6/   UBE x5 min       OH pulleys: flexion, scaption, IR x10 ea       Wall flexion slides with towel x10       Wall press ups x10       RTB scapular rows x15       IASTM left UT x5 min       Self thoracic ext on FR 2x10       GHJ mobilizations GR 3 AP, inferior 6 x10 sec bouts  L ACJ mobilizations R 3-4 AP and inferior glides 5 x 10 sec bouts       Left cross body stretch 3x10 sec       Supine sh flexion to 90 deg YTB rhythmic stabilization 30sec x 3       R s/l: Left sh ER AROM x10  CP x10 min       HEP: exercises added to HEP will be bolded in flow sheet the day they are added and remain in the flowsheet bolded and unbolded if removed from HEP      Charges: Ex 2 MT 1       Total Timed Treatment: 45 min  Total Treatment Time: 55 min

## 2023-12-26 ENCOUNTER — OFFICE VISIT (OUTPATIENT)
Dept: PHYSICAL THERAPY | Age: 58
End: 2023-12-26
Attending: FAMILY MEDICINE
Payer: MEDICAID

## 2023-12-26 PROCEDURE — 97140 MANUAL THERAPY 1/> REGIONS: CPT

## 2023-12-26 PROCEDURE — 97110 THERAPEUTIC EXERCISES: CPT

## 2023-12-26 NOTE — PROGRESS NOTES
Diagnosis:   Neck pain (M54.2)  Cervical paraspinal muscle spasm (V52.760)  Chronic left shoulder pain (M25.512,G89.29)      Referring Provider: Lizett Pagan  Date of Evaluation:    12/14/2023    Precautions:  None Next MD visit:   none scheduled  Date of Surgery: n/a   Insurance Primary/Secondary: BLUE CROSS MEDICAID / N/A     # Auth Visits: 6 visits            Subjective: no new problems. States that she is feeling better with her shoulder pain since last session. States that she does still have pain with certain movements and quick movements of her left shoulder. Pain: 0/10      Objective:   cervical AROM: (* denotes performed with pain)  Flexion: 45 deg  Extension: 45 deg  Sidebending: R 35 deg; L 35 deg  Rotation: R 70 deg; L 70 deg     AROM: (* denotes performed with pain)  Shoulder  Elbow   Flexion: R 170 deg; L 170 deg  Abduction: R 180 deg; L 170 deg  ER: R 90 deg; L 48 deg*  IR: R T7; L T8* WNL    Shoulder PROM: full all planes    Assessment: completed todays treatment without c/o increased pain or discomfort following treatment. Performs correctly today. Good tolerance for HEP progressions today and added to HEP. Shoulder AROM is progressing and less pain with sh ER today during treatment. Denies pain post treatment.      Goals:    (to be met in 8 visits)     Pt will improve shoulder abduction AROM to 180 degrees to improve ability to don deodorant, don/doff shirts, and wash hair   Pt will increase shoulder AROM ER to 90 deg to be able to reach and fasten seatbelt   Pt will increase shoulder AROM IR to T7 pain free to be able to reach in back pocket, tuck in shirt, and turn steering wheel without pain  Pt will improve shoulder strength throughout to 5/5 to improve function with carrying groceries   Pt will demonstrate increased mid/low trap strength to 4+/5 to promote improved shoulder mechanics and stabilization with lifting and reaching   Pt will be independent and compliant with comprehensive HEP to maintain progress achieved in PT    Plan: continue POC progress as tolerated  Date: 12/21/2023  TX#: 2/6 Date: 12/26/2023                TX#: 3/6 Date:                 TX#: 4/ Date:                 TX#: 5/ Date: Tx#: 6/   UBE x5 min UBE x5 min       Corner pectoral stretch 3x10 sec      OH pulleys: flexion, scaption, IR x10 ea OH pulleys: flexion, scaption, IR x10 ea      Wall flexion slides with towel x10 Wall flexion slides with towel x10      Wall press ups x10 Wall press ups x10      RTB scapular rows x15 RTB scapular rows x20  RTB sh IR/ER x10 ea       TRX shoulder AAROM flexion walk out x10      IASTM left UT x5 min IASTM left UT x5 min      Self thoracic ext on FR 2x10 Self thoracic ext on FR 2x10      GHJ mobilizations GR 3 AP, inferior 6 x10 sec bouts  L ACJ mobilizations R 3-4 AP and inferior glides 5 x 10 sec bouts GHJ mobilizations GR 3 AP, inferior 6 x10 sec bouts  L ACJ mobilizations R 3-4 AP and inferior glides 5 x 10 sec bouts      Left cross body stretch 3x10 sec Left cross body stretch 3x10 sec      Supine sh flexion to 90 deg YTB rhythmic stabilization 30sec x 3 Supine sh flexion to 90 deg YTB rhythmic stabilization 30sec x 3      R s/l: Left sh ER AROM x10  CP x10 min R s/l: Left sh ER AROM x10  CP x10 min      HEP: exercises added to HEP will be bolded in flow sheet the day they are added and remain in the flowsheet bolded and unbolded if removed from HEP  Access Code: [email or web address]  URL: [email or web address]  Date: 12/26/2023  Prepared by: Talha Bonner    Exercises  - Corner Pec Major Stretch  - 2-3 x daily - 7 x weekly - 1 sets - 3 reps - 10 sec hold  - Standing Shoulder Row with Anchored Resistance  - 2-3 x daily - 7 x weekly - 1 sets - 15 reps - 3 sec hold  - Wall Push Up  - 2-3 x daily - 7 x weekly - 1 sets - 10 reps - 2 sec hold  - Supine Cross Body Shoulder Stretch  - 2-3 x daily - 7 x weekly - 1 sets - 3 reps - 10 sec hold  - Standing shoulder flexion wall slides  - 2-3 x daily - 7 x weekly - 1 sets - 10 reps - 2 sec hold  - Shoulder External Rotation with Anchored Resistance  - 2-3 x daily - 7 x weekly - 1 sets - 10 reps  - Shoulder Internal Rotation with Resistance  - 2-3 x daily - 7 x weekly - 1 sets - 10 reps  - Sleeper Stretch  - 2-3 x daily - 7 x weekly - 1 sets - 3 reps - 10 sec hold  Charges: Ex 2 MT 1       Total Timed Treatment: 45 min  Total Treatment Time: 55 min

## 2023-12-28 ENCOUNTER — APPOINTMENT (OUTPATIENT)
Dept: PHYSICAL THERAPY | Age: 58
End: 2023-12-28
Attending: FAMILY MEDICINE
Payer: MEDICAID

## 2023-12-28 ENCOUNTER — OFFICE VISIT (OUTPATIENT)
Dept: PHYSICAL THERAPY | Age: 58
End: 2023-12-28
Attending: FAMILY MEDICINE
Payer: MEDICAID

## 2023-12-28 PROCEDURE — 97140 MANUAL THERAPY 1/> REGIONS: CPT

## 2023-12-28 PROCEDURE — 97110 THERAPEUTIC EXERCISES: CPT

## 2023-12-28 NOTE — PROGRESS NOTES
Diagnosis:   Neck pain (M54.2)  Cervical paraspinal muscle spasm (C53.934)  Chronic left shoulder pain (M25.512,G89.29)      Referring Provider: Stanford Damon  Date of Evaluation:    12/14/2023    Precautions:  None Next MD visit:   none scheduled  Date of Surgery: n/a   Insurance Primary/Secondary: BLUE CROSS MEDICAID / N/A     # Auth Visits: 6 visits            Subjective: no new problems. States that she feels better with her ROM and pain. States no sharp pains since last session. Able to don bra behind back with improved ease. Pain: 0/10      Objective:   cervical AROM: (* denotes performed with pain)  Flexion: 45 deg  Extension: 45 deg  Sidebending: R 35 deg; L 35 deg  Rotation: R 70 deg; L 70 deg     AROM: (* denotes performed with pain)  Shoulder  Elbow   Flexion: R 170 deg; L 170 deg  Abduction: R 180 deg; L 170 deg  ER: R 90 deg; L 48 deg*  IR: R T7; L T8* WNL    Shoulder PROM: full all planes    Assessment: completed todays treatment without c/o increased pain or discomfort following treatment. Making good progress with ROM, strength and function. Pt is travelling to [facility] for 1.5 months and will continue with HEP and return back to PT in February. Goals:    (to be met in 8 visits)     Pt will improve shoulder abduction AROM to 180 degrees to improve ability to don deodorant, don/doff shirts, and wash hair   Pt will increase shoulder AROM ER to 90 deg to be able to reach and fasten seatbelt   Pt will increase shoulder AROM IR to T7 pain free to be able to reach in back pocket, tuck in shirt, and turn steering wheel without pain  Pt will improve shoulder strength throughout to 5/5 to improve function with carrying groceries   Pt will demonstrate increased mid/low trap strength to 4+/5 to promote improved shoulder mechanics and stabilization with lifting and reaching   Pt will be independent and compliant with comprehensive HEP to maintain progress achieved in PT    Plan: continue POC progress as tolerated. Pt is travelling to [facility] for 1.5 months and will continue with HEP and return back to  in February. Date: 12/21/2023  TX#: 2/6 Date: 12/26/2023                TX#: 3/6 Date:  12/28/2023               TX#: 4/6 Date:                 TX#: 5/ Date:    Tx#: 6/   UBE x5 min UBE x5 min UBE x5 min      Corner pectoral stretch 3x10 sec Doorway pectoral stretch 3x10 sec     OH pulleys: flexion, scaption, IR x10 ea OH pulleys: flexion, scaption, IR x10 ea OH pulleys: flexion, scaption, IR x10 ea     Wall flexion slides with towel x10 Wall flexion slides with towel x10 Wall flexion slides with towel x15     Wall press ups x10 Wall press ups x10 Wall press ups x15     RTB scapular rows x15 RTB scapular rows x20  RTB sh IR/ER x10 ea RTB scapular rows x25  RTB sh IR/ER x15 ea      TRX shoulder AAROM flexion walk out x10 TRX shoulder AAROM flexion walk out x10     IASTM left UT x5 min IASTM left UT x5 min IASTM left UT x5 min     Self thoracic ext on FR 2x10 Self thoracic ext on FR 2x10 STM left shoulder x5 min     GHJ mobilizations GR 3 AP, inferior 6 x10 sec bouts  L ACJ mobilizations R 3-4 AP and inferior glides 5 x 10 sec bouts GHJ mobilizations GR 3 AP, inferior 6 x10 sec bouts  L ACJ mobilizations R 3-4 AP and inferior glides 5 x 10 sec bouts GHJ mobilizations GR 3 AP, inferior 6 x10 sec bouts  L ACJ mobilizations R 3-4 AP and inferior glides 5 x 10 sec bouts     Left cross body stretch 3x10 sec Left cross body stretch 3x10 sec Left cross body stretch 3x10 sec     Supine sh flexion to 90 deg YTB rhythmic stabilization 30sec x 3 Supine sh flexion to 90 deg YTB rhythmic stabilization 30sec x 3 Supine sh flexion to 90 deg YTB rhythmic stabilization 30sec x 3     R s/l: Left sh ER AROM x10  CP x10 min R s/l: Left sh ER AROM x10  CP x10 min R s/l: Left sh ER AROM 2x10  CP x10 min     HEP: exercises added to HEP will be bolded in flow sheet the day they are added and remain in the flowsheet bolded and unbolded if removed from HEP  Access Code: [email or web address]  URL: [email or web address]  Date: 12/26/2023  Prepared by: Talha Bonner    Exercises  - Corner Pec Major Stretch  - 2-3 x daily - 7 x weekly - 1 sets - 3 reps - 10 sec hold  - Standing Shoulder Row with Anchored Resistance  - 2-3 x daily - 7 x weekly - 1 sets - 15 reps - 3 sec hold  - Wall Push Up  - 2-3 x daily - 7 x weekly - 1 sets - 10 reps - 2 sec hold  - Supine Cross Body Shoulder Stretch  - 2-3 x daily - 7 x weekly - 1 sets - 3 reps - 10 sec hold  - Standing shoulder flexion wall slides  - 2-3 x daily - 7 x weekly - 1 sets - 10 reps - 2 sec hold  - Shoulder External Rotation with Anchored Resistance  - 2-3 x daily - 7 x weekly - 1 sets - 10 reps  - Shoulder Internal Rotation with Resistance  - 2-3 x daily - 7 x weekly - 1 sets - 10 reps  - Sleeper Stretch  - 2-3 x daily - 7 x weekly - 1 sets - 3 reps - 10 sec hold  Charges: Ex 2 (35) MT 1 (10)      Total Timed Treatment: 45 min  Total Treatment Time: 55 min

## 2024-01-02 ENCOUNTER — APPOINTMENT (OUTPATIENT)
Dept: PHYSICAL THERAPY | Age: 59
End: 2024-01-02
Attending: FAMILY MEDICINE
Payer: MEDICAID

## 2024-01-29 ENCOUNTER — TELEPHONE (OUTPATIENT)
Facility: LOCATION | Age: 59
End: 2024-01-29

## 2024-01-29 NOTE — TELEPHONE ENCOUNTER
Transaction ID: [id number] ID: [id number] Date: 2024-01-29  SERENA MCKEON Patient  Member ID  [id number]    Date of Birth  1965-11-09    Gender  Female    Transaction Type  Outpatient Authorization    Organization  [facility]    Payer  [facility] logo     Certificate Information  Reference Number  [id number]    Status  NO ACTION REQUIRED    Message  Requested Service does not require preauthorization. We would strongly encourage you to check benefits for this service.    Member Information  Patient Name  SERENA MCKEON    Patient Date of Birth  1965-11-09    Patient Gender  Female    Member ID  [id number]    Relationship to Subscriber  Self    Subscriber Name  SERENA MCKEON    Requesting Provider     Name  KUMAR SEPULVEDA    NPI  [id number]    Tax Id  [id number]    Specialty  [id number]  Provider Role  Provider    Address  [address] [place] [address]    Phone  [phone]  Fax  [phone]    Contact Name  GOLD HAGAN    Service Information  Service Type  2 - Surgical    Place of Service  22 - [facility]    Service From - To Date  2024-02-08 - 2024-03-27    Level of Service  Elective    Diagnosis Code 1   - Encounter for screening for malignant neoplasm of colon    Procedure Code 1 (CPT/HCPCS)  93963 - DIAGNOSTIC COLONOSCOPY    Quantity  1 Units    Status  NO ACTION REQUIRED    Rendering Provider/Facility     Provider 1  Name  KUMAR SEPULVEDA    NPI  [id number]    Specialty  [id number]  Provider Role  Attending    Address  [address] [place] [address]    Provider 2  Name  [facility]    NPI  [id number]    Provider Role  Facility    Address  [address] [place] [address]

## 2024-02-16 ENCOUNTER — APPOINTMENT (OUTPATIENT)
Dept: PHYSICAL THERAPY | Age: 59
End: 2024-02-16
Attending: FAMILY MEDICINE
Payer: MEDICAID

## 2024-02-23 ENCOUNTER — OFFICE VISIT (OUTPATIENT)
Dept: PHYSICAL THERAPY | Age: 59
End: 2024-02-23
Attending: FAMILY MEDICINE
Payer: MEDICAID

## 2024-02-23 PROCEDURE — 97110 THERAPEUTIC EXERCISES: CPT

## 2024-02-23 PROCEDURE — 97140 MANUAL THERAPY 1/> REGIONS: CPT

## 2024-02-23 NOTE — PROGRESS NOTES
Diagnosis:   Neck pain (M54.2)  Cervical paraspinal muscle spasm (M62.838)  Chronic left shoulder pain (M25.512,G89.29)      Referring Provider: Peace  Date of Evaluation:    12/14/2023    Precautions:  None Next MD visit:   none scheduled  Date of Surgery: n/a   Insurance Primary/Secondary: BLUE CROSS MEDICAID / N/A     # Auth Visits: 6 visits            Subjective: no new problems.states that she went to [place] for a 1.5 months and had to do a lot of moving, cleaning and organizing so her shoudler started bothering her again. She was not working on her HEP due to how busy she was. Sleeping at night is bothering her again.      Pain: 3/10      Objective:   cervical AROM: (* denotes performed with pain)  Flexion: 45 deg  Extension: 45 deg  Sidebending: R 35 deg; L 35 deg  Rotation: R 70 deg; L 70 deg     AROM: (* denotes performed with pain)  Shoulder  Elbow   Flexion: R 170 deg; L 157 deg  Abduction: R 180 deg; L 118 deg*  ER: R 90 deg; L 55 deg*  IR: R T7; L T8* WNL    Shoulder PROM: full all planes    Assessment: Pt does have decreased shoulder AROM as compared to her last session in December. She has not been working on her HEP while on vacation. We were able to improve shoulder AROM by end of session to 150 deg flexion, 130 deg abduction and 70 deg ER but all with pain limiting. PROM is WNL all planes and no capsular tightness noted. Encouraged pt to restart HEP 3x/day and use of cold pack. Pt states understanding.   I anticipate significant improvement in her ROM once she is consistent with her HEP as she was prior to her vacation.     Goals:    (to be met in 8 visits)    Pt will improve shoulder abduction AROM to 180 degrees to improve ability to don deodorant, don/doff shirts, and wash hair   Pt will increase shoulder AROM ER to 90 deg to be able to reach and fasten seatbelt   Pt will increase shoulder AROM IR to T7 pain free to be able to reach in back pocket, tuck in shirt, and turn steering wheel without  pain  Pt will improve shoulder strength throughout to 5/5 to improve function with carrying groceries   Pt will demonstrate increased mid/low trap strength to 4+/5 to promote improved shoulder mechanics and stabilization with lifting and reaching   Pt will be independent and compliant with comprehensive HEP to maintain progress achieved in PT    Plan: continue POC progress as tolerated.  Pt is travelling to [place] for 1.5 months and will continue with HEP and return back to PT in February.  Date: 12/21/2023  TX#: 2/6 Date: 12/26/2023                TX#: 3/6 Date:  12/28/2023               TX#: 4/6 Date:  2/23/2024              TX#: 5/6 Date:   Tx#: 6/   UBE x5 min UBE x5 min UBE x5 min UBE x5 min     Corner pectoral stretch 3x10 sec Doorway pectoral stretch 3x10 sec Doorway pectoral stretch 3x10 sec    OH pulleys: flexion, scaption, IR x10 ea OH pulleys: flexion, scaption, IR x10 ea OH pulleys: flexion, scaption, IR x10 ea OH pulleys: flexion, scaption, IR x10 ea    Wall flexion slides with towel x10 Wall flexion slides with towel x10 Wall flexion slides with towel x15 Wall flexion slides with towel x15    Wall press ups x10 Wall press ups x10 Wall press ups x15 Wall press ups x15    RTB scapular rows x15 RTB scapular rows x20  RTB sh IR/ER x10 ea RTB scapular rows x25  RTB sh IR/ER x15 ea RTB scapular rows x25  RTB sh IR/ER x10 ea     TRX shoulder AAROM flexion walk out x10 TRX shoulder AAROM flexion walk out x10 --    IASTM left UT x5 min IASTM left UT x5 min IASTM left UT x5 min --    Self thoracic ext on FR 2x10 Self thoracic ext on FR 2x10 STM left shoulder x5 min STM left shoulder x5 min    GHJ mobilizations GR 3 AP, inferior 6 x10 sec bouts  L ACJ mobilizations R 3-4 AP and inferior glides 5 x 10 sec bouts GHJ mobilizations GR 3 AP, inferior 6 x10 sec bouts  L ACJ mobilizations R 3-4 AP and inferior glides 5 x 10 sec bouts GHJ mobilizations GR 3 AP, inferior 6 x10 sec bouts  L ACJ mobilizations R 3-4 AP and  inferior glides 5 x 10 sec bouts GHJ mobilizations GR 3 AP, inferior 6 x10 sec bouts  L ACJ mobilizations R 3-4 AP and inferior glides 5 x 10 sec bouts    Left cross body stretch 3x10 sec Left cross body stretch 3x10 sec Left cross body stretch 3x10 sec Left cross body stretch 3x10 sec  L s/l: sleeper stretch 3x10 sec    Supine sh flexion to 90 deg YTB rhythmic stabilization 30sec x 3 Supine sh flexion to 90 deg YTB rhythmic stabilization 30sec x 3 Supine sh flexion to 90 deg YTB rhythmic stabilization 30sec x 3 Supine sh flexion to 90 deg YTB rhythmic stabilization 30sec x 3    R s/l: Left sh ER AROM x10  CP x10 min R s/l: Left sh ER AROM x10  CP x10 min R s/l: Left sh ER AROM 2x10  CP x10 min R s/l: Left sh ER AROM x10  CP x10 min    HEP: exercises added to HEP will be bolded in flow sheet the day they are added and remain in the flowsheet bolded and unbolded if removed from HEP  Access Code: [email or web address]  URL: [email or web address]  Date: 12/26/2023  Prepared by: Berto Carver    Exercises  - Corner Pec Major Stretch  - 2-3 x daily - 7 x weekly - 1 sets - 3 reps - 10 sec hold  - Standing Shoulder Row with Anchored Resistance  - 2-3 x daily - 7 x weekly - 1 sets - 15 reps - 3 sec hold  - Wall Push Up  - 2-3 x daily - 7 x weekly - 1 sets - 10 reps - 2 sec hold  - Supine Cross Body Shoulder Stretch  - 2-3 x daily - 7 x weekly - 1 sets - 3 reps - 10 sec hold  - Standing shoulder flexion wall slides  - 2-3 x daily - 7 x weekly - 1 sets - 10 reps - 2 sec hold  - Shoulder External Rotation with Anchored Resistance  - 2-3 x daily - 7 x weekly - 1 sets - 10 reps  - Shoulder Internal Rotation with Resistance  - 2-3 x daily - 7 x weekly - 1 sets - 10 reps  - Sleeper Stretch  - 2-3 x daily - 7 x weekly - 1 sets - 3 reps - 10 sec hold  Charges: Ex 2 (35) MT 1 (10)      Total Timed Treatment: 45 min  Total Treatment Time: 55 min

## 2024-02-27 ENCOUNTER — TELEPHONE (OUTPATIENT)
Dept: FAMILY MEDICINE CLINIC | Facility: CLINIC | Age: 59
End: 2024-02-27

## 2024-02-27 ENCOUNTER — OFFICE VISIT (OUTPATIENT)
Dept: PHYSICAL THERAPY | Age: 59
End: 2024-02-27
Attending: FAMILY MEDICINE
Payer: MEDICAID

## 2024-02-27 PROCEDURE — 97110 THERAPEUTIC EXERCISES: CPT

## 2024-02-27 PROCEDURE — 97140 MANUAL THERAPY 1/> REGIONS: CPT

## 2024-02-27 NOTE — TELEPHONE ENCOUNTER
Pt called wanting an checo.  Last seen 10/2023.   gave her orders for Phy Therapy at that time.  Pt has been doing it.    Pt does not thinks it's helping.  Did Phy Therapy today, came out of the checo crying in pain.  Physical Therapist told her to call her PCP to ask for something for inflammation.

## 2024-02-27 NOTE — PROGRESS NOTES
Diagnosis:   Neck pain (M54.2)  Cervical paraspinal muscle spasm (M62.838)  Chronic left shoulder pain (M25.512,G89.29)      Referring Provider: Peace  Date of Evaluation:    12/14/2023    Precautions:  None Next MD visit:   none scheduled  Date of Surgery: n/a   Insurance Primary/Secondary: BLUE CROSS MEDICAID / N/A     # Auth Visits: 6 visits            Progress Summary  Pt has attended 6  visits in Physical Therapy.  Silvia reports she was feeling 50% better prior to her trip to [place] and since being back she feels back to square 1 with her shoulder pain and function. She reports she still has difficulty with reaching overhead, out to side, behind her back , carrying groceries and sleeping on her left side. She was doing very well with ROM and strength during her first 4 visits of PT but has declined with the gap in care over the past 1.5 months plus all the strenuous activity she had to complete while in [place]. Her shoulder AROM and strength has declined since evaluation. While in [place] she had to do a lot cleaning, lifting and carrying which aggravated her shoulder pain. She is showing signs of rotator cuff tendonitis and impingement syndrome.  She will benefit from continued skilled PT to achieve goals still in progress.       Objective:   Neck Disability Index Score  Score: 20 % (12/12/2023  4:45 PM)  Score: 30 % 2/27/2024    cervical AROM: (* denotes performed with pain)  Flexion: 45 deg  Extension: 45 deg  Sidebending: R 35 deg; L 35 deg  Rotation: R 70 deg; L 70 deg     AROM: (* denotes performed with pain)  Shoulder 2/27/2024   Flexion: R 170 deg; L 150 deg  Abduction: R 180 deg; L 110 deg*  ER: R 90 deg; L 45 deg*  IR: R T7; L T7*     Accessory motion: thoracic spine: hypomobility. Unable to assess cervical joint mobility due to pt unable to lye flat prone due to chronic positional vertigo  Palpation: no significant tenderness noted on palpation of left UT or rotator cuff     Strength: (* denotes  performed with pain)  UE/Scapular   Shoulder Flex: R 5/5, L 4+/5*  Shoulder ABD (C5): R 5/5, L 4+/5*  Shoulder ER: R 5/5, L 4+/5  Shoulder IR: R 5/5, L 5/5  Biceps (C6): R 55/5, L 5/5     Rhomboids: R 4-/5, L 4-/5  Mid trap: R 4-/5; L 4-/5         Goals:    (to be met in 8 visits)     Pt will improve shoulder abduction AROM to 180 degrees to improve ability to don deodorant, don/doff shirts, and wash hair. In Progress  Pt will increase shoulder AROM ER to 90 deg to be able to reach and fasten seatbelt. In Progress  Pt will increase shoulder AROM IR to T7 pain free to be able to reach in back pocket, tuck in shirt, and turn steering wheel without pain. Part met  Pt will improve shoulder strength throughout to 5/5 to improve function with carrying groceries, In progress  Pt will demonstrate increased mid/low trap strength to 4+/5 to promote improved shoulder mechanics and stabilization with lifting and reaching. In progress  Pt will be independent and compliant with comprehensive HEP to maintain progress achieved in PT.in Progress    Rehab Potential: good    Plan: Continue skilled Physical Therapy POC 2 x/week or a total of 8 more visits over a 90 day period.        Patient/Family/Caregiver was advised of these findings, precautions, and treatment options and has agreed to actively participate in planning and for this course of care.    Thank you for your referral. If you have any questions, please contact me at Dept: [phone].    Sincerely,  Electronically signed by therapist: Berto Carver, PT    Physician's certification required: Yes  Please co-sign or sign and return this letter via fax as soon as possible to [phone].   I certify the need for these services furnished under this plan of treatment and while under my care.    X___________________________________________________ Date____________________    Certification From: 2/27/2024  To:5/27/2024        Subjective: no new problems.states that she went to  [place] for a 1.5 months and had to do a lot of moving, cleaning and organizing so her shoudler started bothering her again. She was not working on her HEP due to how busy she was. Sleeping at night is bothering her again.      Pain: 7/10  At worst 9/10      Objective:   See PN      Assessment: See PN     Goals:   See PN  Plan: Continue POC with MD and insurance approval.  Date:  12/28/2023               TX#: 4/6 Date:  2/23/2024              TX#: 5/6 Date: 2/27/2024  Tx#: 6/6   UBE x5 min UBE x5 min UBE x5 min   Doorway pectoral stretch 3x10 sec Doorway pectoral stretch 3x10 sec Doorway pectoral stretch 3x10 sec   OH pulleys: flexion, scaption, IR x10 ea OH pulleys: flexion, scaption, IR x10 ea OH pulleys: flexion, scaption, IR x10 ea   Wall flexion slides with towel x15 Wall flexion slides with towel x15 Wall flexion slides with towel x10   Wall press ups x15 Wall press ups x15 Wall press ups x20   RTB scapular rows x25  RTB sh IR/ER x15 ea RTB scapular rows x25  RTB sh IR/ER x10 ea RTB scapular rows x25  RTB sh IR/ER x10 ea   TRX shoulder AAROM flexion walk out x10 -- Supine wand flexion x10   IASTM left UT x5 min --    STM left shoulder x5 min STM left shoulder x5 min STM left shoulder x5 min   GHJ mobilizations GR 3 AP, inferior 6 x10 sec bouts  L ACJ mobilizations R 3-4 AP and inferior glides 5 x 10 sec bouts GHJ mobilizations GR 3 AP, inferior 6 x10 sec bouts  L ACJ mobilizations R 3-4 AP and inferior glides 5 x 10 sec bouts GHJ mobilizations GR 3 AP, inferior 6 x10 sec bouts  L ACJ mobilizations R 3-4 AP and inferior glides 5 x 10 sec bouts   Left cross body stretch 3x10 sec Left cross body stretch 3x10 sec  L s/l: sleeper stretch 3x10 sec Left cross body stretch 3x10 sec  L s/l: sleeper stretch 3x10 sec   Supine sh flexion to 90 deg YTB rhythmic stabilization 30sec x 3 Supine sh flexion to 90 deg YTB rhythmic stabilization 30sec x 3 --   R s/l: Left sh ER AROM 2x10  CP x10 min R s/l: Left sh ER AROM x10  CP  x10 min Re-assess  CP x10 min   HEP: exercises added to HEP will be bolded in flow sheet the day they are added and remain in the flowsheet bolded and unbolded if removed from HEP  Access Code: [email or web address]  URL: [email or web address]  Date: 12/26/2023  Prepared by: Berto Carver    Exercises  - Corner Pec Major Stretch  - 2-3 x daily - 7 x weekly - 1 sets - 3 reps - 10 sec hold  - Standing Shoulder Row with Anchored Resistance  - 2-3 x daily - 7 x weekly - 1 sets - 15 reps - 3 sec hold  - Wall Push Up  - 2-3 x daily - 7 x weekly - 1 sets - 10 reps - 2 sec hold  - Supine Cross Body Shoulder Stretch  - 2-3 x daily - 7 x weekly - 1 sets - 3 reps - 10 sec hold  - Standing shoulder flexion wall slides  - 2-3 x daily - 7 x weekly - 1 sets - 10 reps - 2 sec hold  - Shoulder External Rotation with Anchored Resistance  - 2-3 x daily - 7 x weekly - 1 sets - 10 reps  - Shoulder Internal Rotation with Resistance  - 2-3 x daily - 7 x weekly - 1 sets - 10 reps  - Sleeper Stretch  - 2-3 x daily - 7 x weekly - 1 sets - 3 reps - 10 sec hold  2/27/2024   Supine Shoulder Flexion Extension AAROM with Dowel  - 3 x daily - 7 x weekly - 1 sets - 10 reps - 2-3 sec hold  - Seated Shoulder External Rotation AAROM with Cane and Hand in Neutral  - 3 x daily - 7 x weekly - 1 sets - 10 reps - 2-3 sec hold  - Standing Shoulder Abduction ROM with Dowel  - 3 x daily - 7 x weekly - 1 sets - 10 reps - 2-3 sec hold  Charges: Ex 2 (35) MT 1 (10)      Total Timed Treatment: 45 min  Total Treatment Time: 55 min

## 2024-02-28 RX ORDER — MELOXICAM 15 MG/1
TABLET ORAL
Qty: 20 TABLET | Refills: 0 | Status: SHIPPED | OUTPATIENT
Start: 2024-02-28

## 2024-02-28 RX ORDER — OMEPRAZOLE 20 MG/1
CAPSULE, DELAYED RELEASE ORAL
Qty: 30 CAPSULE | Refills: 0 | Status: SHIPPED | OUTPATIENT
Start: 2024-02-28

## 2024-02-28 NOTE — TELEPHONE ENCOUNTER
Dr. Hand,  please advise if OV needed.  Your next open appt is next Tuesday.  Patient asking about antiinflammatory.    Per PT note today:    Progress Summary  Pt has attended 6  visits in Physical Therapy.  Silvia reports she was feeling 50% better prior to her trip to [place] and since being back she feels back to square 1 with her shoulder pain and function. She reports she still has difficulty with reaching overhead, out to side, behind her back , carrying groceries and sleeping on her left side. She was doing very well with ROM and strength during her first 4 visits of PT but has declined with the gap in care over the past 1.5 months plus all the strenuous activity she had to complete while in [place]. Her shoulder AROM and strength has declined since evaluation. While in [place] she had to do a lot cleaning, lifting and carrying which aggravated her shoulder pain. She is showing signs of rotator cuff tendonitis and impingement syndrome.  She will benefit from continued skilled PT to achieve goals still in progress.     Subjective: no new problems.states that she went to [place] for a 1.5 months and had to do a lot of moving, cleaning and organizing so her shoudler started bothering her again. She was not working on her HEP due to how busy she was. Sleeping at night is bothering her again.    Pain: 7/10  At worst 9/10

## 2024-02-29 RX ORDER — OMEPRAZOLE 20 MG/1
CAPSULE, DELAYED RELEASE ORAL
Qty: 90 CAPSULE | Refills: 0 | OUTPATIENT
Start: 2024-02-29

## 2024-02-29 NOTE — TELEPHONE ENCOUNTER
Last read by Silvia Chi at  7:59 PM on 2/28/2024.     No future appts.    Front- please call pt to schedule f/u visit to for further eval and recommendations as per PCP. Thank you.

## 2024-02-29 NOTE — TELEPHONE ENCOUNTER
Prescription for anti-inflammatory sent to pharmacy on file.  [employer] message also sent to patient with details.  Please make sure it is viewed and patient aware of plan and also recommend office visit.

## 2024-02-29 NOTE — TELEPHONE ENCOUNTER
Pt called back and scheduled    Future Appointments   Date Time Provider Department Center   3/5/2024 12:20 PM Skyler Hand MD [facility]   3/7/2024 10:00 AM Berto Carver, PT SN[facility]   3/20/2024  9:00 AM Berto Carver, PT [facility]

## 2024-03-05 ENCOUNTER — HOSPITAL ENCOUNTER (OUTPATIENT)
Dept: GENERAL RADIOLOGY | Age: 59
Discharge: HOME | End: 2024-03-05
Attending: FAMILY MEDICINE
Payer: MEDICAID

## 2024-03-05 ENCOUNTER — OFFICE VISIT (OUTPATIENT)
Dept: FAMILY MEDICINE CLINIC | Facility: CLINIC | Age: 59
End: 2024-03-05
Payer: MEDICAID

## 2024-03-05 VITALS
RESPIRATION RATE: 16 BRPM | HEART RATE: 65 BPM | TEMPERATURE: 97 F | BODY MASS INDEX: 24.72 KG/M2 | SYSTOLIC BLOOD PRESSURE: 120 MMHG | OXYGEN SATURATION: 96 % | DIASTOLIC BLOOD PRESSURE: 70 MMHG | HEIGHT: 63.39 IN | WEIGHT: 141.25 LBS

## 2024-03-05 DIAGNOSIS — M25.512 CHRONIC LEFT SHOULDER PAIN: ICD-10-CM

## 2024-03-05 DIAGNOSIS — F41.8 SITUATIONAL ANXIETY: ICD-10-CM

## 2024-03-05 DIAGNOSIS — Q82.8 POROKERATOSIS: ICD-10-CM

## 2024-03-05 DIAGNOSIS — K21.9 GERD WITHOUT ESOPHAGITIS: ICD-10-CM

## 2024-03-05 DIAGNOSIS — M77.8 LEFT SHOULDER TENDINITIS: Primary | ICD-10-CM

## 2024-03-05 DIAGNOSIS — G89.29 CHRONIC LEFT SHOULDER PAIN: ICD-10-CM

## 2024-03-05 DIAGNOSIS — M62.838 CERVICAL PARASPINAL MUSCLE SPASM: ICD-10-CM

## 2024-03-05 DIAGNOSIS — R21 RASH: ICD-10-CM

## 2024-03-05 DIAGNOSIS — L81.9 DISCOLORATION OF SKIN OF FINGER: ICD-10-CM

## 2024-03-05 DIAGNOSIS — R07.89 OTHER CHEST PAIN: ICD-10-CM

## 2024-03-05 DIAGNOSIS — D70.8 OTHER NEUTROPENIA (HCC): ICD-10-CM

## 2024-03-05 DIAGNOSIS — M77.8 LEFT SHOULDER TENDINITIS: ICD-10-CM

## 2024-03-05 PROCEDURE — 73030 X-RAY EXAM OF SHOULDER: CPT | Performed by: FAMILY MEDICINE

## 2024-03-05 PROCEDURE — 99215 OFFICE O/P EST HI 40 MIN: CPT | Performed by: FAMILY MEDICINE

## 2024-03-05 RX ORDER — TIZANIDINE 4 MG/1
TABLET ORAL
Qty: 30 TABLET | Refills: 0 | Status: SHIPPED | OUTPATIENT
Start: 2024-03-05 | End: 2024-03-18 | Stop reason: ALTCHOICE

## 2024-03-05 RX ORDER — PREDNISONE 20 MG/1
20 TABLET ORAL DAILY
Qty: 5 TABLET | Refills: 0 | Status: SHIPPED | OUTPATIENT
Start: 2024-03-05 | End: 2024-03-18

## 2024-03-05 NOTE — PROGRESS NOTES
Silvia Chi is a 58 year old female.  HPI:  Patient having significant flare of left shoulder pain.  Was in [place] for one month recently.  Did about 4 sessions of physical therapy before her travel and did seem to help with improvement prior to her trip.   While away was doing a lot of physical activity/moving things and some strenuous housework with cleaning, lifting, carrying that seemed to flare up pain.   [place] 3 [place] ago and restarted PT, but after treatment symptoms seem to be much worse.  Meloxicam did not help.  Very difficult to sleep on left side.    No upper extremity numbness or tingling.    Very difficult to do activities with back reaching and pain worse with abduction.  Also, has been having some other random, nonspecific symptoms.  On 2/22/2024, noted sudden numbness, discomfort and white discoloration of R middle finger.. Started rubbing it and massaging it and then was normal. No recurrence.   While in [place], on 2/1/2024 under R eye with swelling and redness/dryness and used some ointment and lasted for a few days, then resolved.  Had rash acutely on 2/13/2024 in [place].  back of knees initially, and upper arms shortly after that. Took Benadryl and some topical antihistamine and sxs resolved after a few days , was very itchy  Has chronic wart on R foot,   Has had multiple treamtments here and with podiatrist.  Has recurrence of lesion.  Now trimming it herself.  Chronic, intermittent L chest pain, not related to activity.  No associated shortness of breath.  Intermittent palpitations.intermittent dry cough, mild  Having some anxiety due to chronic left shoulder pain and above multiple symptoms.    Current Outpatient Medications   Medication Sig Dispense Refill    losartan 25 MG Oral Tab TAKE 1 TABLET BY MOUTH EVERY DAY 90 tablet 0    Ergocalciferol (VITAMIN D OR) Take by mouth daily.      albuterol 108 (90 Base) MCG/ACT Inhalation Aero Soln Inhale 2 puffs into the lungs  every 4 (four) hours as needed.      loratadine (CLARITIN) 10 MG Oral Tab Take 1 tablet (10 mg total) by mouth daily. (Patient taking differently: Take 1 tablet (10 mg total) by mouth as needed.) 30 tablet 3   Myeloic 15mg daily prn      Past Medical History:   Diagnosis Date    Chronic cough     Encounter for IUD insertion 2021    Removed 10/2022    Essential hypertension     High blood pressure     Hx of motion sickness     Vertigo        Past Surgical History:   Procedure Laterality Date    COLONOSCOPY N/A 10/19/2023    Procedure: COLONOSCOPY;  Surgeon: Manda Calderon MD;  Location:  ENDOSCOPY    ENDOMETRIAL BIOPSY - JAR(S): 2  2021    Benign    MIRENA, IUD, 5 YEAR  2021    NEEDLE BIOPSY RIGHT  2009    stereo bx dense tissue          TONSILLECTOMY           Social History     Socioeconomic History    Marital status:    Occupational History     Comment:    Tobacco Use    Smoking status: Never     Passive exposure: Never    Smokeless tobacco: Never   Vaping Use    Vaping Use: Never used   Substance and Sexual Activity    Alcohol use: No     Alcohol/week: 0.0 standard drinks of alcohol    Drug use: No    Sexual activity: Yes     Partners: Male     Birth control/protection: I.U.D.   Other Topics Concern    Caffeine Concern No     Comment: 1-2 cups daily    Exercise No    Seat Belt Yes                   REVIEW OF SYSTEMS:  GENERAL HEALTH: feels well otherwise, mood is good  SKIN: As above  RESPIRATORY: denies shortness of breath with exertion  CARDIOVASCULAR: denies chest pain on exertion  GI: denies abdominal pain, occ heartburn if takes NSAIDs too frequently  : normal bladder  NEURO: denies headaches, denies dizziness    EXAM:  /70   Pulse 65   Temp 97.1 °F (36.2 °C) (Temporal)   Resp 16   Ht 5' 3.39\" (1.61 m)   Wt 141 lb 4 oz (64.1 kg)   LMP  (LMP Unknown)   SpO2 96%   BMI 24.72 kg/m²   Wt Readings from Last 6 Encounters:   24 134 lb (60.8  kg)   03/16/24 140 lb (63.5 kg)   03/06/24 141 lb (64 kg)   03/05/24 141 lb 4 oz (64.1 kg)   10/11/23 136 lb (61.7 kg)   10/12/23 136 lb 6.4 oz (61.9 kg)     GENERAL: well developed, well nourished,in no apparent distress, pleasant affect  SKIN: no rashes noted today.  R hand , ring middle finger with brisk capillary refill.  N/V intact.2+ radial pulse  HEENT: atraumatic, normocephalic,  Conj clear  Oral mucosa moist, throat clear.  NECK: supple,no adenopathy,noTM  No spinal tenderness.  Left paracervical tenderness/spasm.  LUNGS: clear to auscultation  CARDIO: RRR without murmur, normal S1, S2, no ectopy, no chest wall tenderness.  EXTREMITIES: no cyanosis, clubbing or edema  Left shoulder with mild superolateral tenderness.  Limited abduction and internal rotation. N/V intact.  R distal plantar porokeratosis lesion at 2nd MTP, mild overlying tenderness, no erythema.   NEURO: A&O x3, no focal deficits  Normal gait    ASSESSMENT AND PLAN:    1. Left shoulder tendinitis  2. Chronic left shoulder pain  Cervical paraspinal muscle spasm  Patient having significant flareup of left shoulder pain.  Likely exacerbated with recent increase in activity during overseas travel.  Check imaging as ordered.  Limit activities that exacerbate pain.  Local ice/heat, alternate as needed.  Topical analgesics as needed.  No significant improvement with meloxicam.  Prednisone 20 mg daily for 5 days, take with food.  Discontinue if notes any side effects.  Tizanidine 4 mg nightly for 1 week, then nightly/as needed.  Warned of possible sedation.  Tylenol ES as needed.  Continue with PT.  Orthopedic consultation.- Ortho Referral - [facility] ([facility])  - XR Shoulder left complete (Min 2 views) - [facility] Only; Future    3. GERD without esophagitis  Avoid possible dietary triggers.  Omeprazole 20 mg daily as needed.  Limit NSAID use.    4. Rash  Uncertain etiology of rash patient had while overseas.  Responded to antihistamines.   Clinically atopic.  Moisturize regularly.  Follow-up if notes recurrence.    5. Discoloration of skin of finger  Noted transient white discoloration of right ring middle finger while overseas.  No recurrence.  Normal NV exam  SUNNY neg 10/2023  F/u if notes recurrence    6. Other chest pain  Clinically not cardiac.  Likely due to anxiety vs mskl with radiation from L shoulder/neck sxs  monitor    7. Situational anxiety  Clinically related to multiple health concerns as above.  Counseled on coping mechanisms for anxiety  Defers meds or formal counseling  Has good family support.     8.  B12 deficiency   Noted B12 level slightly below goal of greater than 400 with labs done per specialist October 2023.  vitamin B12 supplement 500-1000 mcg daily.    9.  Porokeratosis  Chronic, recurrent symptomatic lesion in right foot.  Trimming only helps transiently.  Has seen podiatrist.  Advised comfortable, cushioned footwear/orthotics.  Trimming as needed.    10.  Leukopenia/neutropenia  Noted has seen hematologist.  Clinically benign ethnic leukopenia  Autoimmune testing negative.  Recommended annual CBC and follow-up with hematology if ANC persistently less than 1000.  Monitor.

## 2024-03-06 ENCOUNTER — OFFICE VISIT (OUTPATIENT)
Dept: ORTHOPEDICS CLINIC | Facility: CLINIC | Age: 59
End: 2024-03-06
Payer: MEDICAID

## 2024-03-06 VITALS — WEIGHT: 141 LBS | BODY MASS INDEX: 24.67 KG/M2 | HEIGHT: 63.39 IN

## 2024-03-06 DIAGNOSIS — M75.02 ADHESIVE CAPSULITIS OF LEFT SHOULDER: Primary | ICD-10-CM

## 2024-03-06 PROCEDURE — 99204 OFFICE O/P NEW MOD 45 MIN: CPT | Performed by: ORTHOPAEDIC SURGERY

## 2024-03-06 PROCEDURE — 20610 DRAIN/INJ JOINT/BURSA W/O US: CPT | Performed by: ORTHOPAEDIC SURGERY

## 2024-03-06 RX ORDER — TRIAMCINOLONE ACETONIDE 40 MG/ML
40 INJECTION, SUSPENSION INTRA-ARTICULAR; INTRAMUSCULAR ONCE
Status: COMPLETED | OUTPATIENT
Start: 2024-03-06 | End: 2024-03-06

## 2024-03-06 RX ADMIN — TRIAMCINOLONE ACETONIDE 40 MG: 40 INJECTION, SUSPENSION INTRA-ARTICULAR; INTRAMUSCULAR at 11:28:00

## 2024-03-06 NOTE — PROGRESS NOTES
[facility] New Consult    Chief Complaint   Patient presents with    Shoulder Pain     CHRONIC LEFT SHOULDER PAIN, ONSET: 6 MONTHS     HPI: The patient is a 58 year old female referred for orthopaedic consultation by Dr. Skyler Hand with complaints of chronic left shoulder pain and stiffness.  Symptoms began without history of significant trauma or injury, although there may have been an exacerbation during some aggressive stretching.  The patient has experienced progressive pain, loss of motion and difficulty with reaching both overhead, behind her back and outward.  Symptoms have been ongoing for over 6 months, and are now affecting ADLs and quality of sleep.  Initial conservative care including rest, anti-inflammatories has not resulted in any improvements.  The patient denies any significant neck pain, distal radiation, catching or locking.  No swelling or discoloration is reported.    Past Medical History:   Diagnosis Date    Chronic cough     Encounter for IUD insertion 2021    Removed 10/2022    Essential hypertension     High blood pressure     Hx of motion sickness     Vertigo      Past Surgical History:   Procedure Laterality Date    COLONOSCOPY N/A 10/19/2023    Procedure: COLONOSCOPY;  Surgeon: Manda Calderon MD;  Location:  ENDOSCOPY    ENDOMETRIAL BIOPSY - JAR(S): 2  2021    Benign    MIRENA, IUD, 5 YEAR  2021    NEEDLE BIOPSY RIGHT      stereo bx dense tissue          TONSILLECTOMY       Current Outpatient Medications   Medication Sig Dispense Refill    tiZANidine 4 MG Oral Tab 1 po at bedtime x 1 week then 1 po at bedtime prn as directed 30 tablet 0    omeprazole 20 MG Oral Capsule Delayed Release 1 po daily for one week, then 1 po daily prn. 30 capsule 0    losartan 25 MG Oral Tab TAKE 1 TABLET BY MOUTH EVERY DAY 90 tablet 0    Ergocalciferol (VITAMIN D OR) Take by mouth daily.      albuterol 108 (90 Base) MCG/ACT Inhalation Aero Soln Inhale 2 puffs into  the lungs every 4 (four) hours as needed.      predniSONE 20 MG Oral Tab Take 1 tablet (20 mg total) by mouth daily for 5 days. (Patient not taking: Reported on 3/6/2024) 5 tablet 0    Meloxicam 15 MG Oral Tab 1 po daily for one week, then 1 po daily prn. Take with food (Patient not taking: Reported on 3/6/2024) 20 tablet 0    loratadine (CLARITIN) 10 MG Oral Tab Take 1 tablet (10 mg total) by mouth daily. (Patient not taking: Reported on 3/6/2024) 30 tablet 3     No Known Allergies  Family History   Problem Relation Age of Onset    Colon Polyps Mother     Renal Disease Father          from CRF    Other (kidney failure) Father     Colon Cancer Paternal Aunt 57    Colon Cancer Paternal Cousin Female 57     Social History     Occupational History     Comment:    Tobacco Use    Smoking status: Never     Passive exposure: Never    Smokeless tobacco: Never   Vaping Use    Vaping Use: Never used   Substance and Sexual Activity    Alcohol use: No     Alcohol/week: 0.0 standard drinks of alcohol    Drug use: No    Sexual activity: Yes     Partners: Male     Birth control/protection: I.U.D.        ROS:  Complete ROS reviewed by me and non-contributory to the chief complaint except as mentioned above.    Physical Exam:    Ht 5' 3.39\" (1.61 m)   Wt 141 lb (64 kg)   LMP  (LMP Unknown)   BMI 24.67 kg/m²   Constitutional: Well developed, well nourished 58 year old female  Psychological: NAD, alert and appropriate  Respiratory: Breathing comfortably on room air with RR of 10-14  Cardiac: Palpable distal pulses with pink warm extremities distally  On examination the patient is a pleasant 58 year old female in no distress.  Limited active range of motion is noted at the affected shoulder with forward elevation to 150 compared to normal on the contralateral shoulder.  Abduction is to 120, external rotation is estimated at 40 degrees compared to 60 on the right and internal rotation is to L4.  Intact 5 out of  5 motor strength is noted in all planes of the rotator cuff with significant pain and stiffness on passive rotation testing.  [place] and Speeds test are both painful.  There is no instability on sulcus or load and shift testing.  Hand, wrist and elbow range of motion is within normal limits.  Neurovascular status is intact on sensory, motor and perfusion assessment distally.    Imaging:  Multiple views of the left shoulder personally viewed, independently interpreted and radiology report read.  No acute fracture, dislocation or late glenohumeral degenerative changes are noted.    XR SHOULDER, COMPLETE (MIN 2 VIEWS), LEFT (CPT=73030)    Result Date: 3/5/2024  CONCLUSION:   Negative for fracture or malalignment.  The glenohumeral joint space is preserved.  Normal acromioclavicular joint with preservation of the subacromial interval.  The visualized bony thorax and regional soft tissues are within normal limits.   LOCATION:  [facility]   Dictated by (CST): Britany Li MD on 3/05/2024 at 2:17 PM     Finalized by (CST): Britany Li MD on 3/05/2024 at 2:18 PM        Assessment/Diagnoses:  Diagnoses and all orders for this visit:    Adhesive capsulitis of left shoulder    Plan:  I reviewed imaging and exam findings with the patient.  The diagnosis is most likely adhesive capsulitis or frozen shoulder.  I explained that the etiology is poorly understood, but the natural history and treatment options were specifically outlined including reduction of the inflammation, passive joint mobilization supplemented by a home stretching program.  I reassured the patient that symptoms typically improve with conservative management.  Formal physical therapy is therefore recommended with over-the-counter use of anti-inflammatory medications as needed.  I counseled the patient to expect some discomfort with gentle passive mobilization.  Symptoms typically improve over the course of months rather than weeks.  In light of the patient's  longstanding symptoms, we also discussed the option for corticosteroid injection.  After discussing the nature risks and benefits, the patient elects to proceed and gives written consent.  All questions were answered and the patient verbalized understanding and appreciation.  Recheck of the patient's progress and response to conservative treatment is recommended in 6 weeks.  Earlier follow-up was recommended if the patient is experiencing new symptoms or no improvement.    Shoulder Glenohumeral Injection Procedure:  After discussing risks, benefits and alternatives to injection including but not limited to needle infection, steroid flare, allergic reaction, transient elevations in blood sugar or failed improvement, the patient gave written and verbal consent to proceed.  Using meticulous sterile technique and after attempted aspiration with a 22 gauge needle, the left shoulder was injected with 40 mg of Kenalog mixed with 4 cc of 1% Xylocaine at the anterior interval triangle to minimal resistance.  The patient tolerated this well and a Band-Aid was applied.  Instructions were given to contact us if the patient experiences any adverse effects.    Patricia Boykin MD, FAAOS  Sports Medicine/Knee and Shoulder  [facility]  Department of Orthopaedics  Phone [phone]  Fax [phone]    This document was partially prepared using Dragon Medical voice recognition software.  Although every attempt is made to correct errors during dictation, discrepancies may still exist.

## 2024-03-07 ENCOUNTER — OFFICE VISIT (OUTPATIENT)
Dept: PHYSICAL THERAPY | Age: 59
End: 2024-03-07
Attending: FAMILY MEDICINE
Payer: MEDICAID

## 2024-03-07 PROCEDURE — 97110 THERAPEUTIC EXERCISES: CPT

## 2024-03-07 PROCEDURE — 97140 MANUAL THERAPY 1/> REGIONS: CPT

## 2024-03-07 NOTE — PROGRESS NOTES
Diagnosis:   Neck pain (M54.2)  Cervical paraspinal muscle spasm (M62.838)  Chronic left shoulder pain (M25.512,G89.29)      Referring Provider: Peace  Date of Evaluation:    12/14/2023    Precautions:  None Next MD visit:   none scheduled  Date of Surgery: n/a   Insurance Primary/Secondary: [employer]ICA[employer] / N/A     # Auth Visits: 6 visits            Subjective: states that she was really sore after last PT session. Saw GP who gave her anti-inflammatory which did not help. Was able to see orthopedic yesterday for evaluation and received a cortisone shot. The shot helped a little bit only. Not able to work on HEP since last session since she was so sore.      Pain: 5/10  At worst 9/10      Objective:   cervical AROM: (* denotes performed with pain)  Flexion: 45 deg  Extension: 45 deg  Sidebending: R 35 deg; L 35 deg  Rotation: R 70 deg; L 70 deg     AROM: (* denotes performed with pain)  Shoulder 2/27/2024   Flexion: R 170 deg; L 155 deg  Abduction: R 180 deg; L 145 deg*  ER: R 90 deg; L 55 deg*  IR: R T7; L T7*     Accessory motion: thoracic spine: hypomobility. Unable to assess cervical joint mobility due to pt unable to lye flat prone due to chronic positional vertigo  Palpation: no significant tenderness noted on palpation of left UT or rotator cuff     Strength: (* denotes performed with pain)  UE/Scapular   Shoulder Flex: R 5/5, L 4+/5*  Shoulder ABD (C5): R 5/5, L 4+/5*  Shoulder ER: R 5/5, L 4+/5  Shoulder IR: R 5/5, L 5/5  Biceps (C6): R 55/5, L 5/5     Rhomboids: R 4-/5, L 4-/5  Mid trap: R 4-/5; L 4-/5            Assessment: completed todays treatment within tolerance of pain. AROM has significantly improved as compared to last session but still restricted. Pain limiting ROM vs capsular tightness and symptoms consistent with tendonitis. Encouraged pt to work on AAROM with wand and wall slides to tolerance 3-4x/day. Pt states understanding.     Goals:   (to be met in 8 visits)  Pt will improve  shoulder abduction AROM to 180 degrees to improve ability to don deodorant, don/doff shirts, and wash hair. In Progress  Pt will increase shoulder AROM ER to 90 deg to be able to reach and fasten seatbelt. In Progress  Pt will increase shoulder AROM IR to T7 pain free to be able to reach in back pocket, tuck in shirt, and turn steering wheel without pain. Part met  Pt will improve shoulder strength throughout to 5/5 to improve function with carrying groceries, In progress  Pt will demonstrate increased mid/low trap strength to 4+/5 to promote improved shoulder mechanics and stabilization with lifting and reaching. In progress  Pt will be independent and compliant with comprehensive HEP to maintain progress achieved in PT.in Progress  Plan: Continue POC with MD and insurance approval.  Date:  12/28/2023               TX#: 4/6 Date:  2/23/2024              TX#: 5/6 Date: 2/27/2024  Tx#: 6/6 Date: 3/7/2024  Tx# 7/8   UBE x5 min UBE x5 min UBE x5 min UBE x5 min   Doorway pectoral stretch 3x10 sec Doorway pectoral stretch 3x10 sec Doorway pectoral stretch 3x10 sec Doorway pectoral stretch 3x10 sec   OH pulleys: flexion, scaption, IR x10 ea OH pulleys: flexion, scaption, IR x10 ea OH pulleys: flexion, scaption, IR x10 ea OH pulleys: flexion, scaption, IR x10 ea   Wall flexion slides with towel x15 Wall flexion slides with towel x15 Wall flexion slides with towel x10 Wall flexion slides with towel x10   Wall press ups x15 Wall press ups x15 Wall press ups x20 Wall press ups x20   RTB scapular rows x25  RTB sh IR/ER x15 ea RTB scapular rows x25  RTB sh IR/ER x10 ea RTB scapular rows x25  RTB sh IR/ER x10 ea RTB scapular rows x25  RTB sh IR/ER x10 ea      TRX flexion walk outs x10   TRX shoulder AAROM flexion walk out x10 -- Supine wand flexion x10 Supine wand flexion x10   IASTM left UT x5 min --  Table stretch for flexion 10x 5 sec   STM left shoulder x5 min STM left shoulder x5 min STM left shoulder x5 min STM left  shoulder x5 min   GHJ mobilizations GR 3 AP, inferior 6 x10 sec bouts  L ACJ mobilizations R 3-4 AP and inferior glides 5 x 10 sec bouts GHJ mobilizations GR 3 AP, inferior 6 x10 sec bouts  L ACJ mobilizations R 3-4 AP and inferior glides 5 x 10 sec bouts GHJ mobilizations GR 3 AP, inferior 6 x10 sec bouts  L ACJ mobilizations R 3-4 AP and inferior glides 5 x 10 sec bouts GHJ mobilizations GR 3 AP, inferior 6 x10 sec bouts  L ACJ mobilizations R 3-4 AP and inferior glides 5 x 10 sec bouts   Left cross body stretch 3x10 sec Left cross body stretch 3x10 sec  L s/l: sleeper stretch 3x10 sec Left cross body stretch 3x10 sec  L s/l: sleeper stretch 3x10 sec Left cross body stretch 3x10 sec  L s/l: sleeper stretch 3x10 sec   Supine sh flexion to 90 deg YTB rhythmic stabilization 30sec x 3 Supine sh flexion to 90 deg YTB rhythmic stabilization 30sec x 3 -- Supine: sh AROM flexion x10   R s/l: Left sh ER AROM 2x10  CP x10 min R s/l: Left sh ER AROM x10  CP x10 min Re-assess  CP x10 min CP x10 min   HEP: exercises added to HEP will be bolded in flow sheet the day they are added and remain in the flowsheet bolded and unbolded if removed from HEP  Access Code: [email or web address]  URL: [email or web address]  Date: 12/26/2023  Prepared by: Berto Carver    Exercises  - Corner Pec Major Stretch  - 2-3 x daily - 7 x weekly - 1 sets - 3 reps - 10 sec hold  - Standing Shoulder Row with Anchored Resistance  - 2-3 x daily - 7 x weekly - 1 sets - 15 reps - 3 sec hold  - Wall Push Up  - 2-3 x daily - 7 x weekly - 1 sets - 10 reps - 2 sec hold  - Supine Cross Body Shoulder Stretch  - 2-3 x daily - 7 x weekly - 1 sets - 3 reps - 10 sec hold  - Standing shoulder flexion wall slides  - 2-3 x daily - 7 x weekly - 1 sets - 10 reps - 2 sec hold  - Shoulder External Rotation with Anchored Resistance  - 2-3 x daily - 7 x weekly - 1 sets - 10 reps  - Shoulder Internal Rotation with Resistance  - 2-3 x daily - 7 x weekly - 1 sets - 10 reps  - Sleeper  Stretch  - 2-3 x daily - 7 x weekly - 1 sets - 3 reps - 10 sec hold  2/27/2024   Supine Shoulder Flexion Extension AAROM with Dowel  - 3 x daily - 7 x weekly - 1 sets - 10 reps - 2-3 sec hold  - Seated Shoulder External Rotation AAROM with Cane and Hand in Neutral  - 3 x daily - 7 x weekly - 1 sets - 10 reps - 2-3 sec hold  - Standing Shoulder Abduction ROM with Dowel  - 3 x daily - 7 x weekly - 1 sets - 10 reps - 2-3 sec hold  Charges: Ex 2 (35) MT 1 (10)      Total Timed Treatment: 45 min  Total Treatment Time: 55 min

## 2024-03-11 ENCOUNTER — OFFICE VISIT (OUTPATIENT)
Dept: PHYSICAL THERAPY | Age: 59
End: 2024-03-11
Attending: FAMILY MEDICINE
Payer: MEDICAID

## 2024-03-11 DIAGNOSIS — M25.512 CHRONIC LEFT SHOULDER PAIN: ICD-10-CM

## 2024-03-11 DIAGNOSIS — M77.8 LEFT SHOULDER TENDINITIS: Primary | ICD-10-CM

## 2024-03-11 DIAGNOSIS — G89.29 CHRONIC LEFT SHOULDER PAIN: ICD-10-CM

## 2024-03-11 PROCEDURE — 97140 MANUAL THERAPY 1/> REGIONS: CPT

## 2024-03-11 PROCEDURE — 97110 THERAPEUTIC EXERCISES: CPT

## 2024-03-11 NOTE — PROGRESS NOTES
Diagnosis:   Neck pain (M54.2)  Cervical paraspinal muscle spasm (M62.838)  Chronic left shoulder pain (M25.512,G89.29)      Referring Provider: Peace  Date of Evaluation:    12/14/2023    Precautions:  None Next MD visit:   none scheduled  Date of Surgery: n/a   Insurance Primary/Secondary: BLUE CROSS MEDICAID / N/A     # Auth Visits: 6 visits            Progress Summary  Pt has attended 8 visits in Physical Therapy. Silvia reports feeling 70% improvement in her shoulder pain and function overall.  She had a cortisone injection into the shoulder last week which really helped her with the pain and she is able to complete her exercises.  She still has difficulty with reaching OH and behind her back. She is still showing signs of rotator cuff tendonitis and impingement syndrome.  She will benefit from continued skilled PT to achieve goals still in progress      Objective:   Neck Disability Index Score  Score: 20 % (12/12/2023  4:45 PM)  Score: 30 % 2/27/2024     Quick Dash: 25/100 (3/11/2024)    cervical AROM: (* denotes performed with pain)  Flexion: 45 deg  Extension: 45 deg  Sidebending: R 35 deg; L 35 deg  Rotation: R 70 deg; L 70 deg     AROM: (* denotes performed with pain)  Shoulder 2/27/2024   Flexion: R 170 deg; L 155 deg  Abduction: R 180 deg; L 138 deg*  ER: R 90 deg; L 59 deg*  IR: R T7; L T7*      Accessory motion: thoracic spine: hypomobility. Unable to assess cervical joint mobility due to pt unable to lye flat prone due to chronic positional vertigo  Palpation: mild tenderness noted on palpation of left posterior shoulder/teres     Strength: (* denotes performed with pain)  UE/Scapular   Shoulder Flex: R 5/5, L 4+/5  Shoulder ABD (C5): R 5/5, L 4+/5*  Shoulder ER: R 5/5, L 4+/5  Shoulder IR: R 5/5, L 5/5  Biceps (C6): R 55/5, L 5/5     Rhomboids: R 4/5, L 4/5  Mid trap: R 4/5; L 4/5          Goals:   (to be met in 8 visits)     Pt will improve shoulder abduction AROM to 180 degrees to improve ability  to don deodorant, don/doff shirts, and wash hair. In Progress  Pt will increase shoulder AROM ER to 90 deg to be able to reach and fasten seatbelt. In Progress  Pt will increase shoulder AROM IR to T7 pain free to be able to reach in back pocket, tuck in shirt, and turn steering wheel without pain. Part met  Pt will improve shoulder strength throughout to 5/5 to improve function with carrying groceries, In progress  Pt will demonstrate increased mid/low trap strength to 4+/5 to promote improved shoulder mechanics and stabilization with lifting and reaching. In progress  Pt will be independent and compliant with comprehensive HEP to maintain progress achieved in PT.in Progress    Rehab Potential: good    Plan: Continue skilled Physical Therapy POC 2 x/week or a total of 8 more visits over a 90 day period.        Patient/Family/Caregiver was advised of these findings, precautions, and treatment options and has agreed to actively participate in planning and for this course of care.    Thank you for your referral. If you have any questions, please contact me at Dept: [phone].    Sincerely,  Electronically signed by therapist: Berto Carver, PT    Physician's certification required: No  Please co-sign or sign and return this letter via fax as soon as possible to [phone].   I certify the need for these services furnished under this plan of treatment and while under my care.    X___________________________________________________ Date____________________    Certification From: 3/11/2024  To:6/9/2024        Subjective: feeling better this week. The shot seemed to help     Pain: 2-3/10  At worst 5/10 since last session      Objective:   See PN        Assessment: See PN  Goals:   (See PN    Plan: Continue POC with insurance approval.  Date: 2/27/2024  Tx#: 6/6 Date: 3/7/2024  Tx# 7/8 Date: 3/11/2024  Tx# 8/8   UBE x5 min UBE x5 min UBE x5 min F/B   Doorway pectoral stretch 3x10 sec Doorway pectoral stretch 3x10 sec  Doorway pectoral stretch 3x10 sec   OH pulleys: flexion, scaption, IR x10 ea OH pulleys: flexion, scaption, IR x10 ea OH pulleys: flexion, scaption, IR x15 ea   Wall flexion slides with towel x10 Wall flexion slides with towel x10 Wall flexion slides with towel x10   Wall press ups x20 Wall press ups x20 Wall press ups x20   RTB scapular rows x25  RTB sh IR/ER x10 ea RTB scapular rows x25  RTB sh IR/ER x10 ea RTB scapular rows x20  RTB sh IR/ER x10 ea    TRX flexion walk outs x10 TRX flexion walk outs x10   Supine wand flexion x10 Supine wand flexion x10 Supine wand flexion x10    Table stretch for flexion 10x 5 sec Table stretch for flexion 10x 5 sec   STM left shoulder x5 min STM left shoulder x5 min STM left shoulder x5 min   GHJ mobilizations GR 3 AP, inferior 6 x10 sec bouts  L ACJ mobilizations R 3-4 AP and inferior glides 5 x 10 sec bouts GHJ mobilizations GR 3 AP, inferior 6 x10 sec bouts  L ACJ mobilizations R 3-4 AP and inferior glides 5 x 10 sec bouts GHJ mobilizations GR 3 AP, inferior 6 x10 sec bouts  L ACJ mobilizations R 3-4 AP and inferior glides 5 x 10 sec bouts   Left cross body stretch 3x10 sec  L s/l: sleeper stretch 3x10 sec Left cross body stretch 3x10 sec  L s/l: sleeper stretch 3x10 sec    -- Supine: sh AROM flexion x10    Re-assess  CP x10 min CP x10 min Re-assess   HEP: exercises added to HEP will be bolded in flow sheet the day they are added and remain in the flowsheet bolded and unbolded if removed from HEP  Access Code: [email or web address]  URL: [email or web address]  Date: 12/26/2023  Prepared by: Berto Carver    Exercises  - Corner Pec Major Stretch  - 2-3 x daily - 7 x weekly - 1 sets - 3 reps - 10 sec hold  - Standing Shoulder Row with Anchored Resistance  - 2-3 x daily - 7 x weekly - 1 sets - 15 reps - 3 sec hold  - Wall Push Up  - 2-3 x daily - 7 x weekly - 1 sets - 10 reps - 2 sec hold  - Supine Cross Body Shoulder Stretch  - 2-3 x daily - 7 x weekly - 1 sets - 3 reps - 10 sec hold  -  Standing shoulder flexion wall slides  - 2-3 x daily - 7 x weekly - 1 sets - 10 reps - 2 sec hold  - Shoulder External Rotation with Anchored Resistance  - 2-3 x daily - 7 x weekly - 1 sets - 10 reps  - Shoulder Internal Rotation with Resistance  - 2-3 x daily - 7 x weekly - 1 sets - 10 reps  - Sleeper Stretch  - 2-3 x daily - 7 x weekly - 1 sets - 3 reps - 10 sec hold  2/27/2024   Supine Shoulder Flexion Extension AAROM with Dowel  - 3 x daily - 7 x weekly - 1 sets - 10 reps - 2-3 sec hold  - Seated Shoulder External Rotation AAROM with Cane and Hand in Neutral  - 3 x daily - 7 x weekly - 1 sets - 10 reps - 2-3 sec hold  - Standing Shoulder Abduction ROM with Dowel  - 3 x daily - 7 x weekly - 1 sets - 10 reps - 2-3 sec hold  Charges: Ex 2 (35) MT 1 (10)      Total Timed Treatment: 45 min  Total Treatment Time: 55 min

## 2024-03-12 ENCOUNTER — TELEPHONE (OUTPATIENT)
Dept: PHYSICAL THERAPY | Facility: HOSPITAL | Age: 59
End: 2024-03-12

## 2024-03-15 ENCOUNTER — PATIENT MESSAGE (OUTPATIENT)
Dept: FAMILY MEDICINE CLINIC | Facility: CLINIC | Age: 59
End: 2024-03-15

## 2024-03-16 ENCOUNTER — HOSPITAL ENCOUNTER (EMERGENCY)
Age: 59
Discharge: HOME | End: 2024-03-16
Attending: STUDENT IN AN ORGANIZED HEALTH CARE EDUCATION/TRAINING PROGRAM
Payer: MEDICAID

## 2024-03-16 ENCOUNTER — APPOINTMENT (OUTPATIENT)
Dept: ULTRASOUND IMAGING | Age: 59
End: 2024-03-16
Attending: PHYSICIAN ASSISTANT
Payer: MEDICAID

## 2024-03-16 VITALS
BODY MASS INDEX: 24.8 KG/M2 | HEIGHT: 63 IN | HEART RATE: 80 BPM | DIASTOLIC BLOOD PRESSURE: 71 MMHG | WEIGHT: 140 LBS | SYSTOLIC BLOOD PRESSURE: 118 MMHG | TEMPERATURE: 98 F | OXYGEN SATURATION: 98 % | RESPIRATION RATE: 16 BRPM

## 2024-03-16 DIAGNOSIS — N92.1 MENORRHAGIA WITH IRREGULAR CYCLE: Primary | ICD-10-CM

## 2024-03-16 DIAGNOSIS — N83.202 CYST OF LEFT OVARY: ICD-10-CM

## 2024-03-16 LAB
ALBUMIN SERPL-MCNC: 4.3 G/DL (ref 3.4–5)
ALBUMIN/GLOB SERPL: 1.2 {RATIO} (ref 1–2)
ALP LIVER SERPL-CCNC: 64 U/L
ALT SERPL-CCNC: 21 U/L
ANION GAP SERPL CALC-SCNC: 4 MMOL/L (ref 0–18)
AST SERPL-CCNC: 11 U/L (ref 15–37)
B-HCG UR QL: NEGATIVE
BASOPHILS # BLD AUTO: 0.02 X10(3) UL (ref 0–0.2)
BASOPHILS NFR BLD AUTO: 0.3 %
BILIRUB SERPL-MCNC: 0.4 MG/DL (ref 0.1–2)
BUN BLD-MCNC: 13 MG/DL (ref 9–23)
CALCIUM BLD-MCNC: 9.3 MG/DL (ref 8.5–10.1)
CHLORIDE SERPL-SCNC: 105 MMOL/L (ref 98–112)
CO2 SERPL-SCNC: 28 MMOL/L (ref 21–32)
CREAT BLD-MCNC: 0.74 MG/DL
EGFRCR SERPLBLD CKD-EPI 2021: 94 ML/MIN/1.73M2 (ref 60–?)
EOSINOPHIL # BLD AUTO: 0.05 X10(3) UL (ref 0–0.7)
EOSINOPHIL NFR BLD AUTO: 0.8 %
ERYTHROCYTE [DISTWIDTH] IN BLOOD BY AUTOMATED COUNT: 12.8 %
GLOBULIN PLAS-MCNC: 3.5 G/DL (ref 2.8–4.4)
GLUCOSE BLD-MCNC: 92 MG/DL (ref 70–99)
HCT VFR BLD AUTO: 44 %
HGB BLD-MCNC: 14.7 G/DL
IMM GRANULOCYTES # BLD AUTO: 0.02 X10(3) UL (ref 0–1)
IMM GRANULOCYTES NFR BLD: 0.3 %
LYMPHOCYTES # BLD AUTO: 2.21 X10(3) UL (ref 1–4)
LYMPHOCYTES NFR BLD AUTO: 37.1 %
MCH RBC QN AUTO: 29.6 PG (ref 26–34)
MCHC RBC AUTO-ENTMCNC: 33.4 G/DL (ref 31–37)
MCV RBC AUTO: 88.5 FL
MONOCYTES # BLD AUTO: 0.54 X10(3) UL (ref 0.1–1)
MONOCYTES NFR BLD AUTO: 9.1 %
NEUTROPHILS # BLD AUTO: 3.12 X10 (3) UL (ref 1.5–7.7)
NEUTROPHILS # BLD AUTO: 3.12 X10(3) UL (ref 1.5–7.7)
NEUTROPHILS NFR BLD AUTO: 52.4 %
OSMOLALITY SERPL CALC.SUM OF ELEC: 284 MOSM/KG (ref 275–295)
PLATELET # BLD AUTO: 244 10(3)UL (ref 150–450)
POTASSIUM SERPL-SCNC: 3.6 MMOL/L (ref 3.5–5.1)
PROT SERPL-MCNC: 7.8 G/DL (ref 6.4–8.2)
RBC # BLD AUTO: 4.97 X10(6)UL
SODIUM SERPL-SCNC: 137 MMOL/L (ref 136–145)
WBC # BLD AUTO: 6 X10(3) UL (ref 4–11)

## 2024-03-16 PROCEDURE — 76856 US EXAM PELVIC COMPLETE: CPT | Performed by: PHYSICIAN ASSISTANT

## 2024-03-16 PROCEDURE — 36415 COLL VENOUS BLD VENIPUNCTURE: CPT

## 2024-03-16 PROCEDURE — 76830 TRANSVAGINAL US NON-OB: CPT | Performed by: PHYSICIAN ASSISTANT

## 2024-03-16 PROCEDURE — 81025 URINE PREGNANCY TEST: CPT

## 2024-03-16 PROCEDURE — 80053 COMPREHEN METABOLIC PANEL: CPT | Performed by: PHYSICIAN ASSISTANT

## 2024-03-16 PROCEDURE — 99284 EMERGENCY DEPT VISIT MOD MDM: CPT

## 2024-03-16 PROCEDURE — 93975 VASCULAR STUDY: CPT | Performed by: PHYSICIAN ASSISTANT

## 2024-03-16 PROCEDURE — 85025 COMPLETE CBC W/AUTO DIFF WBC: CPT | Performed by: PHYSICIAN ASSISTANT

## 2024-03-16 NOTE — ED INITIAL ASSESSMENT (HPI)
Patient here with vaginal bleeding over the past three days. States she is perimenopausal, gets light period every couple months. This has been more than in the past. Denies pain.

## 2024-03-17 NOTE — ED PROVIDER NOTES
HPI:  58-year-old female presenting for evaluation of vaginal bleeding for the last 3 days.    Physical exam:  Constitutional: Patient in no apparent distress  HEENT: No scleral icterus  Abdomen: Soft and nontender        MDM:  Patient was evaluated in conjunction with the [facility], please refer to their documentation for additional details on patient's history, physical examination, evaluation/treatment course, medical decision making (which was performed by myself), workup and disposition.  I personally evaluated the patient and I am in full agreement with the APPs assessment, clinical impression and treatment plan.

## 2024-03-17 NOTE — DISCHARGE INSTRUCTIONS
Follow with OB/GYN.    If you have new, changing or worsening symptoms, please go directly to the ER.

## 2024-03-17 NOTE — ED PROVIDER NOTES
Patient Seen in: [facility] [place]      History     Chief Complaint   Patient presents with    Eval-G     Stated Complaint: vaginal bleeding over the past three days    Subjective:   HPI    CHIEF COMPLAINT: Heavy vaginal bleeding for 3 days     HISTORY OF PRESENT ILLNESS: Patient is a 58-year-old female who presents for evaluation of heavy vaginal bleeding.  States 3 days ago she started with some light spotting and bleeding.  She then developed heavy bleeding where she was changing her pad at least every 2-4 hours.  Reports that her last bleeding episode was approximately 3 months ago.  She had light spotting for a couple of days.  Prior to that it had been about 6 months since her last bleeding episode.  States she has never needed a full 12 months without any bleeding, so she is not quite considered menopausal at this time.  She says back in 2020 when she had an episode of heavy vaginal bleeding that went on for several weeks.  She had an endometrial biopsy that was negative and an IUD was placed.  Bleeding resolved.  About a year later she started with some cramping so the IUD was then removed.  She is sexually active with her .  She states that they do not do anything for pregnancy prevention because she thought that she was menopausal     REVIEW OF SYSTEMS:  Constitutional: no fever, no chills  Eyes: no discharge  ENT: no sore throat  Cardiovascular: no chest pain, no palpitations  Respiratory: no cough, no shortness of breath  Gastrointestinal: no abdominal pain, no vomiting  Genitourinary: As above  Musculoskeletal: no back pain  Skin: no rashes  Neurological: no headache     Otherwise a complete review of systems was obtained and other than the HPI was negative     The patient's medication list, past medical history and social history elements is as listed in today's nurse's notes are reviewed and agree. The patient's family history is reviewed and is noncontributory to the  presenting problem, except as indicated as above.    Objective:   Past Medical History:   Diagnosis Date    Chronic cough     Encounter for IUD insertion 2021    Removed 10/2022    Essential hypertension     High blood pressure     Hx of motion sickness     Vertigo               Past Surgical History:   Procedure Laterality Date    COLONOSCOPY N/A 10/19/2023    Procedure: COLONOSCOPY;  Surgeon: Manda Calderon MD;  Location:  ENDOSCOPY    ENDOMETRIAL BIOPSY - JAR(S): 2  2021    Benign    MIRENA, IUD, 5 YEAR  2021    NEEDLE BIOPSY RIGHT  2009    stereo bx dense tissue          TONSILLECTOMY                  Social History     Socioeconomic History    Marital status:    Occupational History     Comment:    Tobacco Use    Smoking status: Never     Passive exposure: Never    Smokeless tobacco: Never   Vaping Use    Vaping Use: Never used   Substance and Sexual Activity    Alcohol use: No     Alcohol/week: 0.0 standard drinks of alcohol    Drug use: No    Sexual activity: Yes     Partners: Male     Birth control/protection: I.U.D.   Other Topics Concern    Caffeine Concern No     Comment: 1-2 cups daily    Exercise No    Seat Belt Yes              Review of Systems    Positive for stated complaint: vaginal bleeding over the past three days  Other systems are as noted in HPI.  Constitutional and vital signs reviewed.      All other systems reviewed and negative except as noted above.    Physical Exam     ED Triage Vitals [24 1710]   /80   Pulse 82   Resp 18   Temp 97.7 °F (36.5 °C)   Temp src Temporal   SpO2 99 %   O2 Device None (Room air)       Current:/80   Pulse 82   Temp 97.7 °F (36.5 °C) (Temporal)   Resp 18   Ht 160 cm (5' 3\")   Wt 63.5 kg   LMP  (LMP Unknown)   SpO2 99%   BMI 24.80 kg/m²         Physical Exam    Vital signs and nursing notes reviewed  General Appearance: No acute distress  Neurological:  A&Ox3,  Gait normal.  Psychiatric:  calm and cooperative  Respiratory: CTAB  Cardiovascular: RRR, S1/S2, no m/c/r  Musculoskeletal: Extremities are symmetrical, full range of motion  Skin:  warm and dry, no rashes.   Abdomen: Soft, nontender/nondistended.  No guarding or rebound.  No CVA tenderness bilaterally.  Pelvic exam deferred    ED Course     Labs Reviewed   COMP METABOLIC PANEL (14) - Abnormal; Notable for the following components:       Result Value    AST 11 (*)     All other components within normal limits   POCT PREGNANCY URINE - Normal   CBC WITH DIFFERENTIAL WITH PLATELET    Narrative:     The following orders were created for panel order CBC With Differential With Platelet.  Procedure                               Abnormality         Status                     ---------                               -----------         ------                     CBC W/ DIFFERENTIAL[[id number]]                              Final result                 Please view results for these tests on the individual orders.   CBC W/ DIFFERENTIAL             US PELVIS EV W DOPPLER (CPT=76856/29222/17958)    Result Date: 3/16/2024  PROCEDURE:  US PELVIS EV W DOPPLER (CPT=76856/50791/97927)  COMPARISON:  [facility], US PELVIS W EV (CPT=76856/69369), 12/23/2021, 8:13 PM.  INDICATIONS:  vaginal bleeding over the past three days  TECHNIQUE:  Ultrasound of the pelvis was performed with a transvaginal and transabdominal probe.  Doppler evaluation of the ovaries was performed of the ovarian arteries and veins.  B-mode images, Doppler color flow, and spectral waveform analysis were performed.  Transvaginal ultrasound was used to better evaluate adnexal and endometrial detail.  PATIENT STATED HISTORY: (As transcribed by Technologist)  Patient has postmenopausal bleeding for the past three days.    MEASUREMENTS:        Uterus Length:                      8.84 cm x 5.79 cm x 4.72 cm        Endometrium Thickness:      0.94 cm Right Ovary:                         < obscured by  shadowing bowel gas. Left Ovary:                           2.16 cm x 1.22 cm x 1.54 cm  FINDINGS:  PELVIS  UTERUS:  Fibroid uterus.  There is an intramural fibroid within the anterior body measuring 17 x 17 x 18 mm.  There is an intramural fibroid within the posterior uterine body measuring 25 x 24 x 27 mm.  Endometrium is thickened measuring 9 mm. RIGHT OVARY:  Obscured by shadowing bowel gas. LEFT OVARY:  There is a 16 x 10 x 11 mm left ovarian cyst.  This cyst is simple appearing.  CUL-DE-SAC:  Negative.  DOPPLER WAVE FORMS FLOW:  There is normal arterial and venous Doppler wave forms in the left ovary.  The spectral analysis is within normal limits. OTHER:  Negative.            CONCLUSION:  1. Endometrium is thickened for a postmenopausal patient measuring 9 mm.  Differential considerations would include endometrial hyperplasia, endometrial polyp, or a neoplastic process.  GYN follow-up is recommended. 2. Fibroid uterus. 3. There is a 1.6 cm left ovarian cyst.  This cyst is simple appearing. 4. Right ovary is obscured by shadowing bowel gas.   LOCATION:  [facility]     Dictated by (CST): Stromberg, LeRoy, MD on 3/16/2024 at 8:47 PM     Finalized by (CST): Stromberg, LeRoy, MD on 3/16/2024 at 8:50 PM                MDM      This is a well-appearing 58-year-old female with stable vital signs who presents for evaluation of heavy vaginal bleeding for the last 3 days.  Patient had an IV line established and blood work was performed.  CBC showed a white blood cell count of 6000.  Hemoglobin 14.7 and hematocrit 44.  Platelet count normal at 244.  CMP unremarkable.  UCG negative.  Patient was sent for a pelvic ultrasound which showed an endometrial stripe of 9 mm.  Fibroid uterus and 1.6 cm simple left ovarian cyst.  Right ovary not visualized.  Results reviewed with patient.  Needs OB/GYN follow-up for definitive management.  If there are any new, changing or worsening symptoms go to the ER.  Patient voiced understanding  to the treatment plan.  All questions answered.  This patient was seen and examined with Dr. Petersen, who agrees with the disposition and plan                                   Medical Decision Making  Amount and/or Complexity of Data Reviewed  Labs: ordered. Decision-making details documented in ED Course.  Radiology: ordered. Decision-making details documented in ED Course.        Disposition and Plan     Clinical Impression:  1. Menorrhagia with irregular cycle    2. Cyst of left ovary         Disposition:  Discharge  3/16/2024  9:04 pm    Follow-up:  Fortunato Pretty MD  [address] [place] [address]  [address]  [facility] [address] [phone]    Follow up in 3 day(s)            Medications Prescribed:  Current Discharge Medication List

## 2024-03-18 NOTE — TELEPHONE ENCOUNTER
From: Silvia Chi  To: Skyler Hand  Sent: 3/15/2024 5:53 PM CDT  Subject: Abdirizak Hand  Is it normal to have heavy periods after a long time of not having one? During the past year I only had 2 very light cycles. Out of nowhere I had one yesterday and it’s a very heavy flow like before.  Any tests I should do?     Regards,  Silvia

## 2024-03-20 NOTE — TELEPHONE ENCOUNTER
Noted pt seen in ER 3/16/24  Disposition and Plan    Clinical Impression:  1. Menorrhagia with irregular cycle    2. Cyst of left ovary      Referred to Gyne  Planned hysterectomy 3/29/24.

## 2024-03-22 ENCOUNTER — EKG ENCOUNTER (OUTPATIENT)
Dept: LAB | Age: 59
End: 2024-03-22
Attending: OBSTETRICS & GYNECOLOGY
Payer: MEDICAID

## 2024-03-22 DIAGNOSIS — Z01.818 PRE-OP TESTING: ICD-10-CM

## 2024-03-22 PROCEDURE — 93010 ELECTROCARDIOGRAM REPORT: CPT | Performed by: STUDENT IN AN ORGANIZED HEALTH CARE EDUCATION/TRAINING PROGRAM

## 2024-03-22 PROCEDURE — 93005 ELECTROCARDIOGRAM TRACING: CPT

## 2024-03-23 LAB
ATRIAL RATE: 67 BPM
P AXIS: 45 DEGREES
P-R INTERVAL: 138 MS
Q-T INTERVAL: 406 MS
QRS DURATION: 98 MS
QTC CALCULATION (BEZET): 429 MS
R AXIS: -50 DEGREES
T AXIS: 31 DEGREES
VENTRICULAR RATE: 67 BPM

## 2024-03-27 ENCOUNTER — OFFICE VISIT (OUTPATIENT)
Dept: PHYSICAL THERAPY | Age: 59
End: 2024-03-27
Attending: FAMILY MEDICINE
Payer: MEDICAID

## 2024-03-27 PROCEDURE — 97110 THERAPEUTIC EXERCISES: CPT

## 2024-03-27 PROCEDURE — 97140 MANUAL THERAPY 1/> REGIONS: CPT

## 2024-03-27 NOTE — PROGRESS NOTES
Diagnosis:   Neck pain (M54.2)  Cervical paraspinal muscle spasm (M62.838)  Chronic left shoulder pain (M25.512,G89.29)      Referring Provider: Peace  Date of Evaluation:    12/14/2023    Precautions:  None Next MD visit:   none scheduled  Date of Surgery: n/a   Insurance Primary/Secondary: BLUE CROSS MEDICAID / N/A     # Auth Visits: 6 visits              Subjective: states the pain has been better and she has been able to work on her HEP. Reaching behind her back is better. Carrying groceries and reaching really high still bothersome     Pain: 0/10  At worst 2/10 in past week      Objective:   Neck Disability Index Score  Score: 20 % (12/12/2023  4:45 PM)  Score: 30 % 2/27/2024     Quick Dash: 25/100 (3/11/2024)    cervical AROM: (* denotes performed with pain)  Flexion: 45 deg  Extension: 45 deg  Sidebending: R 35 deg; L 35 deg  Rotation: R 70 deg; L 70 deg     AROM: (* denotes performed with pain)  Shoulder 2/27/2024   Flexion: R 170 deg; L 170 deg  Abduction: R 180 deg; L 160 deg  ER: R 90 deg; L 59 deg*  IR: R T7; L T7      Accessory motion: thoracic spine: hypomobility. Unable to assess cervical joint mobility due to pt unable to lye flat prone due to chronic positional vertigo  Palpation: mild tenderness noted on palpation of left posterior shoulder/teres     Strength: (* denotes performed with pain)  UE/Scapular   Shoulder Flex: R 5/5, L 4+/5  Shoulder ABD (C5): R 5/5, L 4+/5*  Shoulder ER: R 5/5, L 4+/5  Shoulder IR: R 5/5, L 5/5  Biceps (C6): R 55/5, L 5/5     Rhomboids: R 4/5, L 4/5  Mid trap: R 4/5; L 4/5              Assessment: completed todays treatment without c/o increased pain or discomfort. Shoulder AROM WNL all planes and pain free today post treatment. Good tolerance for strength progressions and no increases in pain. Making good progress towards goals.      Goals:   (to be met in 8 visits)     Pt will improve shoulder abduction AROM to 180 degrees to improve ability to don deodorant,  don/doff shirts, and wash hair. In Progress  Pt will increase shoulder AROM ER to 90 deg to be able to reach and fasten seatbelt. In Progress  Pt will increase shoulder AROM IR to T7 pain free to be able to reach in back pocket, tuck in shirt, and turn steering wheel without pain. Part met  Pt will improve shoulder strength throughout to 5/5 to improve function with carrying groceries, In progress  Pt will demonstrate increased mid/low trap strength to 4+/5 to promote improved shoulder mechanics and stabilization with lifting and reaching. In progress  Pt will be independent and compliant with comprehensive HEP to maintain progress achieved in PT.in Progress    Plan: Continue POC with insurance approval.  Date: 2/27/2024  Tx#: 6/6 Date: 3/7/2024  Tx# 7/8 Date: 3/11/2024  Tx# 8/8 Date: 3/27/2024  Tx# 9/12   UBE x5 min UBE x5 min UBE x5 min F/B UBE x5 min F/B   Doorway pectoral stretch 3x10 sec Doorway pectoral stretch 3x10 sec Doorway pectoral stretch 3x10 sec Doorway pectoral stretch 3x10 sec   OH pulleys: flexion, scaption, IR x10 ea OH pulleys: flexion, scaption, IR x10 ea OH pulleys: flexion, scaption, IR x15 ea    Wall flexion slides with towel x10 Wall flexion slides with towel x10 Wall flexion slides with towel x10 Wall flexion slides with towel x10   Wall press ups x20 Wall press ups x20 Wall press ups x20 Wall press ups YSB x20   RTB scapular rows x25  RTB sh IR/ER x10 ea RTB scapular rows x25  RTB sh IR/ER x10 ea RTB scapular rows x20  RTB sh IR/ER x10 ea RTB scapular rows x30  RTB sh IR/ER 2x10 ea    TRX flexion walk outs x10 TRX flexion walk outs x10 TRX flexion walk outs x10  TRX low rows x10   Supine wand flexion x10 Supine wand flexion x10 Supine wand flexion x10 Supine wand flexion x10    Table stretch for flexion 10x 5 sec Table stretch for flexion 10x 5 sec Table stretch for flexion 10x 5 sec   STM left shoulder x5 min STM left shoulder x5 min STM left shoulder x5 min STM left shoulder x5 min   GHJ  mobilizations GR 3 AP, inferior 6 x10 sec bouts  L ACJ mobilizations R 3-4 AP and inferior glides 5 x 10 sec bouts GHJ mobilizations GR 3 AP, inferior 6 x10 sec bouts  L ACJ mobilizations R 3-4 AP and inferior glides 5 x 10 sec bouts GHJ mobilizations GR 3 AP, inferior 6 x10 sec bouts  L ACJ mobilizations R 3-4 AP and inferior glides 5 x 10 sec bouts GHJ mobilizations GR 3 AP, inferior 6 x10 sec bouts  L ACJ mobilizations R 3-4 AP and inferior glides 5 x 10 sec bouts   Left cross body stretch 3x10 sec  L s/l: sleeper stretch 3x10 sec Left cross body stretch 3x10 sec  L s/l: sleeper stretch 3x10 sec  Prone scapular rows, sh ext , sh h abd x10 ea   -- Supine: sh AROM flexion x10  --   Re-assess  CP x10 min CP x10 min Re-assess    HEP: exercises added to HEP will be bolded in flow sheet the day they are added and remain in the flowsheet bolded and unbolded if removed from HEP  Access Code: [email or web address]  URL: [email or web address]  Date: 12/26/2023  Prepared by: Berto Carver    Exercises  - Corner Pec Major Stretch  - 2-3 x daily - 7 x weekly - 1 sets - 3 reps - 10 sec hold  - Standing Shoulder Row with Anchored Resistance  - 2-3 x daily - 7 x weekly - 1 sets - 15 reps - 3 sec hold  - Wall Push Up  - 2-3 x daily - 7 x weekly - 1 sets - 10 reps - 2 sec hold  - Supine Cross Body Shoulder Stretch  - 2-3 x daily - 7 x weekly - 1 sets - 3 reps - 10 sec hold  - Standing shoulder flexion wall slides  - 2-3 x daily - 7 x weekly - 1 sets - 10 reps - 2 sec hold  - Shoulder External Rotation with Anchored Resistance  - 2-3 x daily - 7 x weekly - 1 sets - 10 reps  - Shoulder Internal Rotation with Resistance  - 2-3 x daily - 7 x weekly - 1 sets - 10 reps  - Sleeper Stretch  - 2-3 x daily - 7 x weekly - 1 sets - 3 reps - 10 sec hold  2/27/2024   Supine Shoulder Flexion Extension AAROM with Dowel  - 3 x daily - 7 x weekly - 1 sets - 10 reps - 2-3 sec hold  - Seated Shoulder External Rotation AAROM with Cane and Hand in Neutral  - 3  x daily - 7 x weekly - 1 sets - 10 reps - 2-3 sec hold  - Standing Shoulder Abduction ROM with Dowel  - 3 x daily - 7 x weekly - 1 sets - 10 reps - 2-3 sec hold  Charges: Ex 2 (35) MT 1 (10)      Total Timed Treatment: 45 min  Total Treatment Time: 45 min

## 2024-04-03 ENCOUNTER — OFFICE VISIT (OUTPATIENT)
Dept: PHYSICAL THERAPY | Age: 59
End: 2024-04-03
Attending: FAMILY MEDICINE
Payer: MEDICAID

## 2024-04-03 DIAGNOSIS — I10 ESSENTIAL HYPERTENSION: ICD-10-CM

## 2024-04-03 PROCEDURE — 97140 MANUAL THERAPY 1/> REGIONS: CPT

## 2024-04-03 PROCEDURE — 97110 THERAPEUTIC EXERCISES: CPT

## 2024-04-03 NOTE — PROGRESS NOTES
Diagnosis:   Neck pain (M54.2)  Cervical paraspinal muscle spasm (M62.838)  Chronic left shoulder pain (M25.512,G89.29)      Referring Provider: Peace  Date of Evaluation:    12/14/2023    Precautions:  None Next MD visit:   none scheduled  Date of Surgery: n/a   Insurance Primary/Secondary: BLUE CROSS MEDICAID / N/A     # Auth Visits: 12 visits              Subjective: continues to feels better. Feels 90% better. Reaching really high still a little bothersome. Not lifting anything heavy yet because thinks it will hurt.      Pain: 0/10  At worst 2/10 in past week      Objective:   Neck Disability Index Score  Score: 20 % (12/12/2023  4:45 PM)  Score: 30 % 2/27/2024     Quick Dash: 25/100 (3/11/2024)    cervical AROM: (* denotes performed with pain)  Flexion: 45 deg  Extension: 45 deg  Sidebending: R 35 deg; L 35 deg  Rotation: R 70 deg; L 70 deg     AROM: (* denotes performed with pain)  Shoulder 2/27/2024   Flexion: R 170 deg; L 170 deg  Abduction: R 180 deg; L 160 deg  ER: R 90 deg; L 65 deg  IR: R T7; L T7      Accessory motion: thoracic spine: hypomobility. Unable to assess cervical joint mobility due to pt unable to lye flat prone due to chronic positional vertigo  Palpation: mild tenderness noted on palpation of left posterior shoulder/teres     Strength: (* denotes performed with pain)  UE/Scapular   Shoulder Flex: R 5/5, L 4+/5  Shoulder ABD (C5): R 5/5, L 4+/5*  Shoulder ER: R 5/5, L 4+/5  Shoulder IR: R 5/5, L 5/5  Biceps (C6): R 55/5, L 5/5     Rhomboids: R 4/5, L 4/5  Mid trap: R 4/5; L 4/5              Assessment: completed todays treatment without c/o increased pain or discomfort. Shoulder AROM WNL all planes and pain free today post treatment. Shoulder flexion full and pain free today post treatment. Good tolerance for strength progression but does have fatigue with TBand exercises. Denies pain post treatment.       Goals:   (to be met in 8 visits)     Pt will improve shoulder abduction AROM to 180  degrees to improve ability to don deodorant, don/doff shirts, and wash hair. In Progress  Pt will increase shoulder AROM ER to 90 deg to be able to reach and fasten seatbelt. In Progress  Pt will increase shoulder AROM IR to T7 pain free to be able to reach in back pocket, tuck in shirt, and turn steering wheel without pain. Part met  Pt will improve shoulder strength throughout to 5/5 to improve function with carrying groceries, In progress  Pt will demonstrate increased mid/low trap strength to 4+/5 to promote improved shoulder mechanics and stabilization with lifting and reaching. In progress  Pt will be independent and compliant with comprehensive HEP to maintain progress achieved in PT.in Progress    Plan: Continue POC with insurance approval.  Date: 2/27/2024  Tx#: 6/6 Date: 3/7/2024  Tx# 7/8 Date: 3/11/2024  Tx# 8/8 Date: 3/27/2024  Tx# 9/12 Date: 4/3/2024  Tx# 10/12   UBE x5 min UBE x5 min UBE x5 min F/B UBE x5 min F/B UBE x5 min F/B   Doorway pectoral stretch 3x10 sec Doorway pectoral stretch 3x10 sec Doorway pectoral stretch 3x10 sec Doorway pectoral stretch 3x10 sec Doorway pectoral stretch 3x10 sec   OH pulleys: flexion, scaption, IR x10 ea OH pulleys: flexion, scaption, IR x10 ea OH pulleys: flexion, scaption, IR x15 ea     Wall flexion slides with towel x10 Wall flexion slides with towel x10 Wall flexion slides with towel x10 Wall flexion slides with towel x10 Wall flexion slides with towel x10   Wall press ups x20 Wall press ups x20 Wall press ups x20 Wall press ups YSB x20 Wall press ups YSB x20   RTB scapular rows x25  RTB sh IR/ER x10 ea RTB scapular rows x25  RTB sh IR/ER x10 ea RTB scapular rows x20  RTB sh IR/ER x10 ea RTB scapular rows x30  RTB sh IR/ER 2x10 ea GTB scapular rows x30  RTB sh IR/ER 2x10 ea    TRX flexion walk outs x10 TRX flexion walk outs x10 TRX flexion walk outs x10  TRX low rows x10 TRX flexion walk outs x10  TRX low rows x15   Supine wand flexion x10 Supine wand flexion x10  Supine wand flexion x10 Supine wand flexion x10 Supine wand flexion x10    Table stretch for flexion 10x 5 sec Table stretch for flexion 10x 5 sec Table stretch for flexion 10x 5 sec    STM left shoulder x5 min STM left shoulder x5 min STM left shoulder x5 min STM left shoulder x5 min STM left shoulder x5 min   GHJ mobilizations GR 3 AP, inferior 6 x10 sec bouts  L ACJ mobilizations R 3-4 AP and inferior glides 5 x 10 sec bouts GHJ mobilizations GR 3 AP, inferior 6 x10 sec bouts  L ACJ mobilizations R 3-4 AP and inferior glides 5 x 10 sec bouts GHJ mobilizations GR 3 AP, inferior 6 x10 sec bouts  L ACJ mobilizations R 3-4 AP and inferior glides 5 x 10 sec bouts GHJ mobilizations GR 3 AP, inferior 6 x10 sec bouts  L ACJ mobilizations R 3-4 AP and inferior glides 5 x 10 sec bouts GHJ mobilizations GR 3 AP, inferior 6 x10 sec bouts  L ACJ mobilizations R 3-4 AP and inferior glides 5 x 10 sec bouts   Left cross body stretch 3x10 sec  L s/l: sleeper stretch 3x10 sec Left cross body stretch 3x10 sec  L s/l: sleeper stretch 3x10 sec  Prone scapular rows, sh ext , sh h abd x10 ea Prone scapular rows, sh ext , sh h abd x10 ea   -- Supine: sh AROM flexion x10  --    Re-assess  CP x10 min CP x10 min Re-assess     HEP: exercises added to HEP will be bolded in flow sheet the day they are added and remain in the flowsheet bolded and unbolded if removed from HEP  Access Code: [email or web address]  URL: [email or web address]  Date: 12/26/2023  Prepared by: Berto Carver    Exercises  - Corner Pec Major Stretch  - 2-3 x daily - 7 x weekly - 1 sets - 3 reps - 10 sec hold  - Standing Shoulder Row with Anchored Resistance  - 2-3 x daily - 7 x weekly - 1 sets - 15 reps - 3 sec hold  - Wall Push Up  - 2-3 x daily - 7 x weekly - 1 sets - 10 reps - 2 sec hold  - Supine Cross Body Shoulder Stretch  - 2-3 x daily - 7 x weekly - 1 sets - 3 reps - 10 sec hold  - Standing shoulder flexion wall slides  - 2-3 x daily - 7 x weekly - 1 sets - 10 reps - 2  sec hold  - Shoulder External Rotation with Anchored Resistance  - 2-3 x daily - 7 x weekly - 1 sets - 10 reps  - Shoulder Internal Rotation with Resistance  - 2-3 x daily - 7 x weekly - 1 sets - 10 reps  - Sleeper Stretch  - 2-3 x daily - 7 x weekly - 1 sets - 3 reps - 10 sec hold  2/27/2024   Supine Shoulder Flexion Extension AAROM with Dowel  - 3 x daily - 7 x weekly - 1 sets - 10 reps - 2-3 sec hold  - Seated Shoulder External Rotation AAROM with Cane and Hand in Neutral  - 3 x daily - 7 x weekly - 1 sets - 10 reps - 2-3 sec hold  - Standing Shoulder Abduction ROM with Dowel  - 3 x daily - 7 x weekly - 1 sets - 10 reps - 2-3 sec hold  Charges: Ex 2 (30) MT 1 (10)      Total Timed Treatment: 40 min  Total Treatment Time: 40 min

## 2024-04-04 RX ORDER — LOSARTAN POTASSIUM 25 MG/1
TABLET ORAL
Qty: 90 TABLET | Refills: 1 | Status: SHIPPED | OUTPATIENT
Start: 2024-04-04

## 2024-04-04 NOTE — TELEPHONE ENCOUNTER
Hypertension Medications Protocol [id number]/03/2024 02:22 PM   Protocol Details CMP or BMP in past 12 months    Last BP reading less than 140/90    In person appointment or virtual visit in the past 12 mos or appointment in next 3 mos    EGFRCR or GFRNAA > 50     LOV 3/5/2024    Future Appointments   Date Time Provider Department Center   4/8/2024  2:15 PM [facility]   4/15/2024  2:15 PM [facility]       LAST LAB 3/16/2024

## 2024-04-08 ENCOUNTER — APPOINTMENT (OUTPATIENT)
Dept: PHYSICAL THERAPY | Age: 59
End: 2024-04-08
Attending: FAMILY MEDICINE
Payer: MEDICAID

## 2024-04-15 ENCOUNTER — OFFICE VISIT (OUTPATIENT)
Dept: PHYSICAL THERAPY | Age: 59
End: 2024-04-15
Attending: FAMILY MEDICINE
Payer: MEDICAID

## 2024-04-15 PROCEDURE — 97110 THERAPEUTIC EXERCISES: CPT

## 2024-04-15 PROCEDURE — 97140 MANUAL THERAPY 1/> REGIONS: CPT

## 2024-04-15 NOTE — PROGRESS NOTES
Diagnosis:   Neck pain (M54.2)  Cervical paraspinal muscle spasm (M62.838)  Chronic left shoulder pain (M25.512,G89.29)      Referring Provider: Peace  Date of Evaluation:    12/14/2023    Precautions:  None Next MD visit:   none scheduled  Date of Surgery: n/a   Insurance Primary/Secondary: BLUE CROSS MEDICAID / N/A     # Auth Visits: 12 visits            Discharge Summary  Pt has attended 11 visits in Physical Therapy. Silvia reports feeling >95% improvement in her shoulder pain and function. Pt reports that she is able to complete all her ADLs without pain or discomfort. She reports only having a slight problem/discomfort with reaching high up her back.  She has made excellent progress with her shoulder ROM and strength. She will be discharged from PT to a [facility] at this time with most goals met.     Objective:   Neck Disability Index Score  Score: 20 % (12/12/2023  4:45 PM)  Score: 30 % 2/27/2024  Score: 0% 4/15/2024       Quick Dash: 25/100 (3/11/2024)    cervical AROM: (* denotes performed with pain)  Flexion: 50 deg  Extension: 60 deg  Sidebending: R 35 deg; L 35 deg  Rotation: R 70 deg; L 70 deg     AROM: (* denotes performed with pain)  Shoulder 2/27/2024   Flexion: R 170 deg; L 170 deg  Abduction: R 180 deg; L 180 deg  ER: R 90 deg; L 80 deg  IR: R T7; L T5      Palpation: no tenderness noted on palpation of left shoulder    Strength: (* denotes performed with pain)  UE/Scapular   Shoulder Flex: R 5/5, L 5/5  Shoulder ABD (C5): R 5/5, L 5/5  Shoulder ER: R 5/5, L 5/5  Shoulder IR: R 5/5, L 5/5  Biceps (C6): R 55/5, L 5/5     Rhomboids: R 4/+5, L 4+/5  Mid trap: R 4+/5; L 4+/5          Goals:   (to be met in 12 visits)  Pt will improve shoulder abduction AROM to 180 degrees to improve ability to don deodorant, don/doff shirts, and wash hair. Met  Pt will increase shoulder AROM ER to 90 deg to be able to reach and fasten seatbelt. Not MEt but 80 deg and pain free  Pt will increase shoulder AROM IR to T7 pain  free to be able to reach in back pocket, tuck in shirt, and turn steering wheel without pain. Met   Pt will improve shoulder strength throughout to 5/5 to improve function with carrying groceries, Met   Pt will demonstrate increased mid/low trap strength to 4+/5 to promote improved shoulder mechanics and stabilization with lifting and reaching. Met   Pt will be independent and compliant with comprehensive HEP to maintain progress achieved in PT. Met       Plan: Recommend to discharge patient from PT to HEP.         Patient/Family/Caregiver was advised of these findings, precautions, and treatment options and has agreed to actively participate in planning and for this course of care.    Thank you for your referral. If you have any questions, please contact me at Dept: [phone].    Sincerely,  Electronically signed by therapist: Berto Carver, PT    Physician's certification required: No  Please co-sign or sign and return this letter via fax as soon as possible to [phone].   I certify the need for these services furnished under this plan of treatment and while under my care.    X___________________________________________________ Date____________________    Certification From: 4/15/2024  To:7/14/2024          Subjective: feels >95% better. Lifted her grand daughter yesterday and no shoulder pain    Pain: 0/10        Date: 3/27/2024  Tx# 9/12 Date: 4/3/2024  Tx# 10/12 Date: 4/15/2024  Tx# 11/12   UBE x5 min F/B UBE x5 min F/B UBE x5 min F/B   Doorway pectoral stretch 3x10 sec Doorway pectoral stretch 3x10 sec Doorway pectoral stretch 3x10 sec        Wall flexion slides with towel x10 Wall flexion slides with towel x10 Wall flexion slides with towel x10   Wall press ups YSB x20 Wall press ups YSB x20 Wall press ups YSB x20   RTB scapular rows x30  RTB sh IR/ER 2x10 ea GTB scapular rows x30  RTB sh IR/ER 2x10 ea GTB scapular rows x30  RTB sh IR/ER 2x10 ea   TRX flexion walk outs x10  TRX low rows x10 TRX flexion  walk outs x10  TRX low rows x15 TRX flexion walk outs x10  TRX low rows x15   Supine wand flexion x10 Supine wand flexion x10 --   Table stretch for flexion 10x 5 sec     STM left shoulder x5 min STM left shoulder x5 min --   GHJ mobilizations GR 3 AP, inferior 6 x10 sec bouts  L ACJ mobilizations R 3-4 AP and inferior glides 5 x 10 sec bouts GHJ mobilizations GR 3 AP, inferior 6 x10 sec bouts  L ACJ mobilizations R 3-4 AP and inferior glides 5 x 10 sec bouts GHJ mobilizations GR 3 AP, inferior 6 x10 sec bouts  L ACJ mobilizations R 3-4 AP and inferior glides 5 x 10 sec bouts   Prone scapular rows, sh ext , sh h abd x10 ea Prone scapular rows, sh ext , sh h abd x10 ea Prone scapular rows, sh ext , sh h abd x10 ea   --  Re-assess        HEP: exercises added to HEP will be bolded in flow sheet the day they are added and remain in the flowsheet bolded and unbolded if removed from HEP  Access Code: [email or web address]  URL: [email or web address]  Date: 12/26/2023  Prepared by: Berto Carver    Exercises  - Corner Pec Major Stretch  - 2-3 x daily - 7 x weekly - 1 sets - 3 reps - 10 sec hold  - Standing Shoulder Row with Anchored Resistance  - 2-3 x daily - 7 x weekly - 1 sets - 15 reps - 3 sec hold  - Wall Push Up  - 2-3 x daily - 7 x weekly - 1 sets - 10 reps - 2 sec hold  - Supine Cross Body Shoulder Stretch  - 2-3 x daily - 7 x weekly - 1 sets - 3 reps - 10 sec hold  - Standing shoulder flexion wall slides  - 2-3 x daily - 7 x weekly - 1 sets - 10 reps - 2 sec hold  - Shoulder External Rotation with Anchored Resistance  - 2-3 x daily - 7 x weekly - 1 sets - 10 reps  - Shoulder Internal Rotation with Resistance  - 2-3 x daily - 7 x weekly - 1 sets - 10 reps  - Sleeper Stretch  - 2-3 x daily - 7 x weekly - 1 sets - 3 reps - 10 sec hold  2/27/2024   Supine Shoulder Flexion Extension AAROM with Dowel  - 3 x daily - 7 x weekly - 1 sets - 10 reps - 2-3 sec hold  - Seated Shoulder External Rotation AAROM with Cane and Hand in  Neutral  - 3 x daily - 7 x weekly - 1 sets - 10 reps - 2-3 sec hold  - Standing Shoulder Abduction ROM with Dowel  - 3 x daily - 7 x weekly - 1 sets - 10 reps - 2-3 sec hold  Charges: Ex 2 (30) MT 1 (10)      Total Timed Treatment: 40 min  Total Treatment Time: 40 min

## 2024-04-17 ENCOUNTER — ANESTHESIA EVENT (OUTPATIENT)
Dept: SURGERY | Facility: HOSPITAL | Age: 59
End: 2024-04-17
Payer: MEDICAID

## 2024-04-19 ENCOUNTER — HOSPITAL ENCOUNTER (OUTPATIENT)
Age: 59
Discharge: HOME | End: 2024-04-19
Payer: MEDICAID

## 2024-04-19 VITALS
HEIGHT: 60 IN | BODY MASS INDEX: 26.5 KG/M2 | TEMPERATURE: 98 F | HEART RATE: 77 BPM | DIASTOLIC BLOOD PRESSURE: 77 MMHG | RESPIRATION RATE: 16 BRPM | OXYGEN SATURATION: 100 % | WEIGHT: 135 LBS | SYSTOLIC BLOOD PRESSURE: 114 MMHG

## 2024-04-19 DIAGNOSIS — H10.31 ACUTE CONJUNCTIVITIS OF RIGHT EYE, UNSPECIFIED ACUTE CONJUNCTIVITIS TYPE: Primary | ICD-10-CM

## 2024-04-19 PROCEDURE — 99213 OFFICE O/P EST LOW 20 MIN: CPT

## 2024-04-19 PROCEDURE — 99214 OFFICE O/P EST MOD 30 MIN: CPT

## 2024-04-19 RX ORDER — TOBRAMYCIN 3 MG/ML
1 SOLUTION/ DROPS OPHTHALMIC EVERY 4 HOURS
Qty: 1 EACH | Refills: 0 | Status: SHIPPED | OUTPATIENT
Start: 2024-04-19 | End: 2024-04-26

## 2024-04-19 NOTE — ED PROVIDER NOTES
Patient Seen in: [facility]      History     Chief Complaint   Patient presents with    Eye Visual Problem     Stated Complaint: eye issue    Subjective:   HPI  58-year-old female who is not a contact lens wearer presents complaining of right eye redness, irritation, and crusting with discharge that started yesterday.  She denies any visual changes.    Objective:   Past Medical History:    Chronic cough    Encounter for IUD insertion    Removed 10/2022    Essential hypertension    High blood pressure    Hx of motion sickness    Vertigo              Past Surgical History:   Procedure Laterality Date    Colonoscopy N/A 10/19/2023    Procedure: COLONOSCOPY;  Surgeon: Manda Calderon MD;  Location:  ENDOSCOPY    Endometrial biopsy - jar(s): 2  2021    Benign    Mirena, iud, 5 year  2021    Needle biopsy right  2009    stereo bx dense tissue          Tonsillectomy                  Social History     Socioeconomic History    Marital status:    Occupational History     Comment:    Tobacco Use    Smoking status: Never     Passive exposure: Never    Smokeless tobacco: Never   Vaping Use    Vaping status: Never Used   Substance and Sexual Activity    Alcohol use: No     Alcohol/week: 0.0 standard drinks of alcohol    Drug use: No    Sexual activity: Yes     Partners: Male     Birth control/protection: I.U.D.   Other Topics Concern    Caffeine Concern No     Comment: 1-2 cups daily    Exercise No    Seat Belt Yes              Review of Systems   All other systems reviewed and are negative.      Positive for stated complaint: eye issue  Other systems are as noted in HPI.  Constitutional and vital signs reviewed.      All other systems reviewed and negative except as noted above.    Physical Exam     ED Triage Vitals [24 1028]   /77   Pulse 77   Resp 16   Temp 98 °F (36.7 °C)   Temp src    SpO2 100 %   O2 Device None (Room air)       Current:/77    Pulse 77   Temp 98 °F (36.7 °C)   Resp 16   Ht 152.4 cm (5')   Wt 61.2 kg   LMP  (LMP Unknown)   SpO2 100%   BMI 26.37 kg/m²         Physical Exam  Vitals and nursing note reviewed.   Constitutional:       General: She is not in acute distress.     Appearance: She is well-developed. She is not ill-appearing or toxic-appearing.   Eyes:      Extraocular Movements: Extraocular movements intact.      Pupils: Pupils are equal, round, and reactive to light.      Comments: Right conjunctival injection with no proptosis or pain with extraocular movements.  No significant drainage.   Cardiovascular:      Rate and Rhythm: Normal rate.   Pulmonary:      Effort: Pulmonary effort is normal.   Skin:     General: Skin is warm and dry.   Neurological:      Mental Status: She is alert and oriented to person, place, and time.               ED Course   Labs Reviewed - No data to display                   MDM     Medical Decision Making  58-year-old female who is not a contact lens wearer presents complaining of right eye redness, irritation, and crusting with discharge that started yesterday.  She denies any visual changes.    Clinical impression: conjunctivitis    Differential diagnosis: conjunctivitis, keratitis, iritis    Appears to have conjunctivitis.  She will be given an antibiotic drops and she is getting a hysteroscopy and does not want to miss her procedure.  She was also given follow-up with ophthalmology as needed.    Problems Addressed:  Acute conjunctivitis of right eye, unspecified acute conjunctivitis type: acute illness or injury        Disposition and Plan     Clinical Impression:  1. Acute conjunctivitis of right eye, unspecified acute conjunctivitis type         Disposition:  Discharge  4/19/2024 10:41 am    Follow-up:  Brett Frankel MD  [address] [address]  [phone]    Call             Medications Prescribed:  Discharge Medication List as of 4/19/2024 10:44 AM        START  taking these medications    Details   tobramycin 0.3 % Ophthalmic Solution Place 1 drop into the right eye every 4 (four) hours for 7 days., Normal, Disp-1 each, R-0

## 2024-04-19 NOTE — ED INITIAL ASSESSMENT (HPI)
Since yesterday, c/o right eye redness, itchiness with crusty discharge. Denies eye injury and does not wear contacts.

## 2024-04-25 ENCOUNTER — ANESTHESIA (OUTPATIENT)
Dept: SURGERY | Facility: HOSPITAL | Age: 59
End: 2024-04-25
Payer: MEDICAID

## 2024-04-25 ENCOUNTER — HOSPITAL ENCOUNTER (OUTPATIENT)
Facility: HOSPITAL | Age: 59
Setting detail: HOSPITAL OUTPATIENT SURGERY
Discharge: HOME | End: 2024-04-25
Attending: OBSTETRICS & GYNECOLOGY | Admitting: OBSTETRICS & GYNECOLOGY
Payer: MEDICAID

## 2024-04-25 VITALS
HEART RATE: 55 BPM | BODY MASS INDEX: 23.51 KG/M2 | WEIGHT: 132.69 LBS | HEIGHT: 63 IN | SYSTOLIC BLOOD PRESSURE: 146 MMHG | RESPIRATION RATE: 16 BRPM | DIASTOLIC BLOOD PRESSURE: 87 MMHG | OXYGEN SATURATION: 100 % | TEMPERATURE: 98 F

## 2024-04-25 PROCEDURE — 88305 TISSUE EXAM BY PATHOLOGIST: CPT | Performed by: OBSTETRICS & GYNECOLOGY

## 2024-04-25 PROCEDURE — 0UB98ZX EXCISION OF UTERUS, VIA NATURAL OR ARTIFICIAL OPENING ENDOSCOPIC, DIAGNOSTIC: ICD-10-PCS | Performed by: OBSTETRICS & GYNECOLOGY

## 2024-04-25 RX ORDER — SODIUM CHLORIDE, SODIUM LACTATE, POTASSIUM CHLORIDE, CALCIUM CHLORIDE 600; 310; 30; 20 MG/100ML; MG/100ML; MG/100ML; MG/100ML
INJECTION, SOLUTION INTRAVENOUS CONTINUOUS
Status: DISCONTINUED | OUTPATIENT
Start: 2024-04-25 | End: 2024-04-25

## 2024-04-25 RX ORDER — ACETAMINOPHEN 500 MG
1000 TABLET ORAL ONCE
Status: DISCONTINUED | OUTPATIENT
Start: 2024-04-25 | End: 2024-04-25 | Stop reason: HOSPADM

## 2024-04-25 RX ORDER — LIDOCAINE HYDROCHLORIDE 10 MG/ML
INJECTION, SOLUTION EPIDURAL; INFILTRATION; INTRACAUDAL; PERINEURAL AS NEEDED
Status: DISCONTINUED | OUTPATIENT
Start: 2024-04-25 | End: 2024-04-25 | Stop reason: HOSPADM

## 2024-04-25 RX ORDER — LIDOCAINE HYDROCHLORIDE 10 MG/ML
INJECTION, SOLUTION EPIDURAL; INFILTRATION; INTRACAUDAL; PERINEURAL AS NEEDED
Status: DISCONTINUED | OUTPATIENT
Start: 2024-04-25 | End: 2024-04-25 | Stop reason: SURG

## 2024-04-25 RX ORDER — ACETAMINOPHEN 500 MG
1000 TABLET ORAL ONCE AS NEEDED
Status: DISCONTINUED | OUTPATIENT
Start: 2024-04-25 | End: 2024-04-25

## 2024-04-25 RX ORDER — ONDANSETRON 2 MG/ML
4 INJECTION INTRAMUSCULAR; INTRAVENOUS EVERY 6 HOURS PRN
Status: DISCONTINUED | OUTPATIENT
Start: 2024-04-25 | End: 2024-04-25

## 2024-04-25 RX ORDER — KETOROLAC TROMETHAMINE 30 MG/ML
INJECTION, SOLUTION INTRAMUSCULAR; INTRAVENOUS AS NEEDED
Status: DISCONTINUED | OUTPATIENT
Start: 2024-04-25 | End: 2024-04-25 | Stop reason: SURG

## 2024-04-25 RX ORDER — MIDAZOLAM HYDROCHLORIDE 1 MG/ML
INJECTION INTRAMUSCULAR; INTRAVENOUS AS NEEDED
Status: DISCONTINUED | OUTPATIENT
Start: 2024-04-25 | End: 2024-04-25 | Stop reason: SURG

## 2024-04-25 RX ORDER — NALOXONE HYDROCHLORIDE 0.4 MG/ML
0.08 INJECTION, SOLUTION INTRAMUSCULAR; INTRAVENOUS; SUBCUTANEOUS AS NEEDED
Status: DISCONTINUED | OUTPATIENT
Start: 2024-04-25 | End: 2024-04-25

## 2024-04-25 RX ORDER — PROCHLORPERAZINE EDISYLATE 5 MG/ML
5 INJECTION INTRAMUSCULAR; INTRAVENOUS EVERY 8 HOURS PRN
Status: DISCONTINUED | OUTPATIENT
Start: 2024-04-25 | End: 2024-04-25

## 2024-04-25 RX ORDER — HYDROCODONE BITARTRATE AND ACETAMINOPHEN 5; 325 MG/1; MG/1
1 TABLET ORAL ONCE AS NEEDED
Status: DISCONTINUED | OUTPATIENT
Start: 2024-04-25 | End: 2024-04-25

## 2024-04-25 RX ORDER — SCOLOPAMINE TRANSDERMAL SYSTEM 1 MG/1
1 PATCH, EXTENDED RELEASE TRANSDERMAL ONCE
Status: DISCONTINUED | OUTPATIENT
Start: 2024-04-25 | End: 2024-04-25 | Stop reason: HOSPADM

## 2024-04-25 RX ORDER — HYDROCODONE BITARTRATE AND ACETAMINOPHEN 5; 325 MG/1; MG/1
2 TABLET ORAL ONCE AS NEEDED
Status: DISCONTINUED | OUTPATIENT
Start: 2024-04-25 | End: 2024-04-25

## 2024-04-25 RX ADMIN — SODIUM CHLORIDE, SODIUM LACTATE, POTASSIUM CHLORIDE, CALCIUM CHLORIDE: 600; 310; 30; 20 INJECTION, SOLUTION INTRAVENOUS at 09:49:00

## 2024-04-25 RX ADMIN — SODIUM CHLORIDE, SODIUM LACTATE, POTASSIUM CHLORIDE, CALCIUM CHLORIDE: 600; 310; 30; 20 INJECTION, SOLUTION INTRAVENOUS at 09:55:00

## 2024-04-25 RX ADMIN — KETOROLAC TROMETHAMINE 30 MG: 30 INJECTION, SOLUTION INTRAMUSCULAR; INTRAVENOUS at 09:50:00

## 2024-04-25 RX ADMIN — MIDAZOLAM HYDROCHLORIDE 2 MG: 1 INJECTION INTRAMUSCULAR; INTRAVENOUS at 09:21:00

## 2024-04-25 RX ADMIN — LIDOCAINE HYDROCHLORIDE 10 MG: 10 INJECTION, SOLUTION EPIDURAL; INFILTRATION; INTRACAUDAL; PERINEURAL at 09:23:00

## 2024-04-25 NOTE — H&P
Gyn Pre-op H&P    57yo  with PMB and thickened endometrium for hysteroscopy, D&C.  Has not had bleeding for about 1 month now.      OB History    Para Term  AB Living   4 4 4     4   SAB IAB Ectopic Multiple Live Births           4      # Outcome Date GA Lbr Rao/2nd Weight Sex Type Anes PTL Lv   4 Term     F NORMAL SPONT   GAVINO   3 Term 1996    F NORMAL SPONT   GAVINO   2 Term 1992    M NORMAL SPONT   GAVINO   1 Term 90    F NORMAL SPONT   GAVINO      Obstetric Comments   Four vaginal deliveries without complications     Past Medical History:    Chronic cough    Encounter for IUD insertion    Removed 10/2022    Essential hypertension    High blood pressure    Hx of motion sickness    Vertigo       Past Surgical History:   Procedure Laterality Date    Colonoscopy N/A 10/19/2023    Procedure: COLONOSCOPY;  Surgeon: Manda Calderon MD;  Location:  ENDOSCOPY    Endometrial biopsy - jar(s): 2  2021    Benign    Mirena, iud, 5 year  2021    Needle biopsy right  2009    stereo bx dense tissue          Tonsillectomy         No current facility-administered medications on file prior to encounter.     Current Outpatient Medications on File Prior to Encounter   Medication Sig Dispense Refill    Ergocalciferol (VITAMIN D OR) Take 1 tablet by mouth daily.      albuterol 108 (90 Base) MCG/ACT Inhalation Aero Soln Inhale 2 puffs into the lungs every 4 (four) hours as needed. (Patient not taking: Reported on 2024)      loratadine (CLARITIN) 10 MG Oral Tab Take 1 tablet (10 mg total) by mouth daily. (Patient taking differently: Take 1 tablet (10 mg total) by mouth daily as needed for Allergies.) 30 tablet 3       No Known Allergies      Family History   Problem Relation Age of Onset    Colon Polyps Mother     Renal Disease Father          from CRF    Other (kidney failure) Father     Colon Cancer Paternal Aunt 57    Colon Cancer Paternal Cousin Female 57       Social History      Socioeconomic History    Marital status:    Occupational History     Comment:    Tobacco Use    Smoking status: Never     Passive exposure: Never    Smokeless tobacco: Never   Vaping Use    Vaping status: Never Used   Substance and Sexual Activity    Alcohol use: No     Alcohol/week: 0.0 standard drinks of alcohol    Drug use: No    Sexual activity: Yes     Partners: Male     Birth control/protection: I.U.D.   Other Topics Concern    Caffeine Concern No     Comment: 1-2 cups daily    Exercise No    Seat Belt Yes       A comprehensive 10 point review of systems was completed.  Pertinent positives and negatives noted in the the HPI.    Exam:  /90 (BP Location: Right arm)   Pulse 67   Temp 98.3 °F (36.8 °C) (Oral)   Resp 18   Ht 5' 3\" (1.6 m)   Wt 132 lb 11.5 oz (60.2 kg)   LMP  (LMP Unknown)   SpO2 100%   BMI 23.51 kg/m²   Gen: appears well, nad  CV: rrr  Resp: cta b/l  Abd: soft, ntnd, no masses  Pelvic: deferred to OR  Ext: nontender, no edema    A/P: 57yo  with PMB and thickened endometrium measuring 9mm for hysteroscopy, D&C.  Procedure, risks, and recovery expectations reviewed.  Will proceed.    Savana Goldberg MD

## 2024-04-25 NOTE — ANESTHESIA POSTPROCEDURE EVALUATION
[facility]    Silvia Chi Patient Status:  Hospital Outpatient Surgery   Age/Gender 58 year old female MRN [id number]   Location [facility] PERIOPERATIVE SERVICE Attending Savana Goldberg MD   Hosp Day # 0 PCP Skyler Hand MD       Anesthesia Post-op Note    HYSTEROSCOPY, POLYPECTOMY DILATION AND CURETTAGE    Procedure Summary       Date: 04/25/24 Room / Location:  MAIN OR 11 / [facility] MAIN OR    Anesthesia Start: 0921 Anesthesia Stop: 1000    Procedure: HYSTEROSCOPY, POLYPECTOMY DILATION AND CURETTAGE (Uterus) Diagnosis: (POST MENOPAUSAL BLEEDING, THICKENED ENDOMETRIUM)    Surgeons: Savana Goldberg MD Anesthesiologist: Tam Villegas MD    Anesthesia Type: MAC ASA Status: 2            Anesthesia Type: MAC    Vitals Value Taken Time   /73 04/25/24 1030   Temp 96.8 °F (36 °C) 04/25/24 0957   Pulse 52 04/25/24 1040   Resp 16 04/25/24 1005   SpO2 100 % 04/25/24 1040   Vitals shown include unfiled device data.    Patient Location: Same Day Surgery    Anesthesia Type: MAC    Airway Patency: patent    Postop Pain Control: adequate    Mental Status: mildly sedated but able to meaningfully participate in the post-anesthesia evaluation    Nausea/Vomiting: none    Cardiopulmonary/Hydration status: stable euvolemic    Complications: no apparent anesthesia related complications    Postop vital signs: stable    Dental Exam: Unchanged from Preop    Patient to be discharged home when criteria met.

## 2024-04-25 NOTE — DISCHARGE INSTRUCTIONS
Hysteroscopy and Dilatation and Curettage    Daphne Muñiz, Sukhwinder, Luis A Bradley Sayla, Schreiber, Saroj, Marko, Weeks    After surgery you may have some light vaginal bleeding. This may last up to a week and is not a concern unless it is heavier than a menstrual period. If you had an endometrial ablation, you can expect a watery discharge which may last for up to 4-6 weeks.    You should avoid tampons for the first week after surgery. Also no intercourse for one week after a D and C and two weeks after an ablation.    You may experience cramping the day/evening of surgery. This is usually relieved with over the counter Acetaminophen (Tylenol), Ibuprofen (Motrin or Advil) or Naprosyn (Aleve). A prescription pain medicine may be ordered by your physician. You may also be given a medication for possible nausea.    You should rest the day of surgery. No driving for 24 hours after general anesthesia or sedation or if you are taking prescription pain medications. You may resume normal activities the next day. Showering is fine the day after surgery. Exercise is fine the next day if you are comfortable doing it.    You may resume your normal diet once your appetite returns after surgery.  Some patients will experience nausea from anesthesia or narcotics: we recommend you start with a light diet. Do not drink alcohol or use other sedating medications (sleep aids, sedating cold medications) if taking prescription pain medications. Resume your normal medications as instructed.    Please call our office if you experience any of the following symptoms:  Temperature over 100.5 degrees  Vaginal bleeding heavier than a moderate period  Severe abdominal/pelvic pain  Shortness of breath or chest pain  New onset of leg pain and/or swelling    If a specimen was sent to pathology, you can expect a call from your doctor within 10 days with the report.   If your doctor requested a post op appointment, please call to  schedule an appointment for 2 weeks post op.    Please call with any other questions or concerns.    Office Number: [phone]      You received a drug called Toradol which is an Anti Inflammatory at: 09:50  If you are allowed to take Anti inflammatories:    Do not take any Anti Inflammatory like Motrin, Aleve or Ibuprophen until after: 3:50pm  Please report any suspected allergic reactions or bleeding issues to your doctor

## 2024-04-25 NOTE — OPERATIVE REPORT
[facility]  Operative Note    Silvia Chi Patient Status:  Hospital Outpatient Surgery    1965 MRN [id number]   Location [facility] SURGERY Attending Savana Goldberg MD   Hosp Day # 0 PCP Skyler Hand MD     Preoperative Diagnosis: POST MENOPAUSAL BLEEDING, THICKENED ENDOMETRIUM  Postoperative Diagnosis: POST MENOPAUSAL BLEEDING, THICKENED ENDOMETRIUM  Procedure: hysteroscopy, polypectomy, D&C    Primary surgeon: Savana Goldberg MD  Assistant: none    Surgical Findings: small endometrial polyp, otherwise atrophic appearing endometrium, TO visualized  Anesthesia: MAC  Complications: None; patient tolerated the procedure well.  Specimen: endometrial polyp and curettings  Drains: none  Condition: doing well without problems  Estimated blood loss: 0ml    Procedure:  Patient was taken to operating room where MAC anesthesia was obtained without difficulty. She was prepped and draped in the usual sterile fashion in the dorsal lithotomy position. EUA revealed anteverted uterus. Bladder was drained of 25ml of clear yellow urine. [place] metal speculum was placed in the vagina and cervix was identified. Anterior lip of cervix was grasped with a single-toothed tenaculum. A paracervical block was achieved by injecting 1% plain lidocaine at 4:00 and 8:00. The cervix was serially dilated to size 15 patterson dilator. The truclear diagnostic hysteroscope was entered into the uterine cavity and above findings were noted.  Truclear soft tissue mini blade was used to excise the polyp as well as obtain a global 360 degree curettage of the uterine lining.  Instruments were removed from the vagina.  Patient tolerated the procedure well. Counts were correct x2.    Savana Goldberg MD  2024  9:50 AM

## 2024-04-25 NOTE — ANESTHESIA PREPROCEDURE EVALUATION
PRE-OP EVALUATION    Patient Name: Silvia Chi    Admit Diagnosis: POST MENOPAUSAL BLEEDING, THICKENED ENDOMETRIUM    Pre-op Diagnosis: POST MENOPAUSAL BLEEDING, THICKENED ENDOMETRIUM    HYSTEROSCOPY, POLYPECTOMY DILATION AND CURETTAGE    Anesthesia Procedure: HYSTEROSCOPY, POLYPECTOMY DILATION AND CURETTAGE (Uterus)    Surgeons and Role:     * Savana Goldberg MD - Primary    Pre-op vitals reviewed.  Temp: 98.3 °F (36.8 °C)  Pulse: 67  Resp: 18  BP: 145/90  SpO2: 100 %  Body mass index is 23.51 kg/m².    Current medications reviewed.  Hospital Medications:   [Transfer Hold] acetaminophen (Tylenol Extra Strength) tab 1,000 mg  1,000 mg Oral Once    [Transfer Hold] scopolamine (Transderm-Scop) 1 MG/3DAYS patch 1 patch  1 patch Transdermal Once    lactated ringers infusion   Intravenous Continuous       Outpatient Medications:     Facility-Administered Medications Prior to Admission   Medication Dose Route Frequency Provider Last Rate Last Admin    [COMPLETED] triamcinolone acetonide (Kenalog-40) 40 MG/ML injection 40 mg  40 mg Intramuscular Once Patricia Boykin MD   40 mg at 03/06/24 1128     Medications Prior to Admission   Medication Sig Dispense Refill Last Dose    losartan 25 MG Oral Tab TAKE 1 TABLET BY MOUTH EVERY DAY 90 tablet 1 4/24/2024 at 0800    tobramycin 0.3 % Ophthalmic Solution Place 1 drop into the right eye every 4 (four) hours for 7 days. 1 each 0 4/23/2024    Ergocalciferol (VITAMIN D OR) Take 1 tablet by mouth daily.   More than a month    albuterol 108 (90 Base) MCG/ACT Inhalation Aero Soln Inhale 2 puffs into the lungs every 4 (four) hours as needed. (Patient not taking: Reported on 4/19/2024)   More than a month    loratadine (CLARITIN) 10 MG Oral Tab Take 1 tablet (10 mg total) by mouth daily. (Patient taking differently: Take 1 tablet (10 mg total) by mouth daily as needed for Allergies.) 30 tablet 3 More than a month       Allergies: Patient has no known allergies.      Anesthesia  Evaluation    Patient summary reviewed.    Anesthetic Complications  (-) history of anesthetic complications         GI/Hepatic/Renal    Negative GI/hepatic/renal ROS.                             Cardiovascular    Negative cardiovascular ROS.              (+) hypertension   (+) hyperlipidemia                                  Endo/Other    Negative endo/other ROS.                              Pulmonary    Negative pulmonary ROS.                       Neuro/Psych    Negative neuro/psych ROS.                                  Past Surgical History:   Procedure Laterality Date    Colonoscopy N/A 10/19/2023    Procedure: COLONOSCOPY;  Surgeon: Manda Calderon MD;  Location:  ENDOSCOPY    Endometrial biopsy - jar(s): 2  2021    Benign    Mirena, iud, 5 year  2021    Needle biopsy right  2009    stereo bx dense tissue          Tonsillectomy       Social History     Socioeconomic History    Marital status:    Occupational History     Comment:    Tobacco Use    Smoking status: Never     Passive exposure: Never    Smokeless tobacco: Never   Vaping Use    Vaping status: Never Used   Substance and Sexual Activity    Alcohol use: No     Alcohol/week: 0.0 standard drinks of alcohol    Drug use: No    Sexual activity: Yes     Partners: Male     Birth control/protection: I.U.D.   Other Topics Concern    Caffeine Concern No     Comment: 1-2 cups daily    Exercise No    Seat Belt Yes     History   Drug Use No     Available pre-op labs reviewed.  Lab Results   Component Value Date    WBC 6.0 2024    RBC 4.97 2024    HGB 14.7 2024    HCT 44.0 2024    MCV 88.5 2024    MCH 29.6 2024    MCHC 33.4 2024    RDW 12.8 2024    .0 2024     Lab Results   Component Value Date     2024    K 3.6 2024     2024    CO2 28.0 2024    BUN 13 2024    CREATSERUM 0.74 2024    GLU 92 2024    CA 9.3  03/16/2024            Airway      Mallampati: II  Mouth opening: 3 FB  TM distance: 4 - 6 cm  Neck ROM: full Cardiovascular    Cardiovascular exam normal.         Dental    Dentition appears grossly intact         Pulmonary    Pulmonary exam normal.                 Other findings              ASA: 2   Plan: MAC  NPO status verified and patient meets guidelines.    Post-procedure pain management plan discussed with surgeon and patient.      Plan/risks discussed with: patient and spouse                Present on Admission:  **None**

## 2024-09-04 ENCOUNTER — OFFICE VISIT (OUTPATIENT)
Dept: FAMILY MEDICINE CLINIC | Facility: CLINIC | Age: 59
End: 2024-09-04

## 2024-09-04 VITALS
OXYGEN SATURATION: 98 % | RESPIRATION RATE: 16 BRPM | TEMPERATURE: 98 F | BODY MASS INDEX: 24.1 KG/M2 | WEIGHT: 136 LBS | HEART RATE: 88 BPM | HEIGHT: 63 IN | DIASTOLIC BLOOD PRESSURE: 68 MMHG | SYSTOLIC BLOOD PRESSURE: 116 MMHG

## 2024-09-04 DIAGNOSIS — E53.8 B12 DEFICIENCY: ICD-10-CM

## 2024-09-04 DIAGNOSIS — R05.3 CHRONIC COUGH: ICD-10-CM

## 2024-09-04 DIAGNOSIS — F41.8 SITUATIONAL ANXIETY: ICD-10-CM

## 2024-09-04 DIAGNOSIS — K64.8 OTHER HEMORRHOIDS: ICD-10-CM

## 2024-09-04 DIAGNOSIS — I10 ESSENTIAL HYPERTENSION: ICD-10-CM

## 2024-09-04 DIAGNOSIS — K21.9 LARYNGOPHARYNGEAL REFLUX (LPR): ICD-10-CM

## 2024-09-04 DIAGNOSIS — Z51.81 ENCOUNTER FOR MEDICATION MONITORING: ICD-10-CM

## 2024-09-04 DIAGNOSIS — Z00.00 LABORATORY EXAMINATION ORDERED AS PART OF A COMPLETE PHYSICAL EXAMINATION: ICD-10-CM

## 2024-09-04 DIAGNOSIS — Z00.00 ROUTINE PHYSICAL EXAMINATION: Primary | ICD-10-CM

## 2024-09-04 PROCEDURE — 99396 PREV VISIT EST AGE 40-64: CPT | Performed by: FAMILY MEDICINE

## 2024-09-04 RX ORDER — ALBUTEROL SULFATE AND BUDESONIDE 90; 80 UG/1; UG/1
2 AEROSOL, METERED RESPIRATORY (INHALATION) 2 TIMES DAILY PRN
Qty: 5.9 G | Refills: 1 | COMMUNITY
Start: 2024-09-04

## 2024-09-04 RX ORDER — LOSARTAN POTASSIUM 25 MG/1
TABLET ORAL
Qty: 90 TABLET | Refills: 1 | Status: SHIPPED | OUTPATIENT
Start: 2024-09-04

## 2024-09-04 RX ORDER — CLONAZEPAM 0.5 MG/1
TABLET ORAL
Qty: 30 TABLET | Refills: 0 | Status: SHIPPED | OUTPATIENT
Start: 2024-09-04

## 2024-09-04 NOTE — PROGRESS NOTES
Silvia Chi is a 58 year old female.  HPI:  Pt is here for routine physical  No reg exercise. Keeps physically.   Appetite good. Does some stress eating so wt gain.  Usually diet is healthy.  Having a lot of anxiety lately related to personal family stressors.   Having recurrent dry cough for about 2 weeks, and intermittent choking episodes  Cough was worse 2 weeks ago and better now  Similar to previous with dx of LPR  No heartburn.  Talking is a trigger.  No choking while eating but after eating gets cough  No nasal congestion.  Occ nasal drainage.   Postmenopausal. No abnormal vaginal discharge or spotting.    Status post hysteroscopy, polypectomy, D&C in 2024.  No pelvic pain.  G4, P4  No breast complaints.  Intermittent hemorrhoidal  pain flare lately, and notes small end of blood with wiping at times.  Has multiple BMs throughout the day, 3-4 times  No straining.   Not treating hemorrhoids  No abdominal pain.  No nausea or vomiting.  Vision/hearing normal.  Sleep okay.  Denies any chest pain, no shortness of breath, no palpitations.    Current Outpatient Medications   Medication Sig Dispense Refill                  losartan 25 MG Oral Tab TAKE 1 TABLET BY MOUTH EVERY DAY 90 tablet 1    Ergocalciferol (VITAMIN D OR) Take 1 tablet by mouth daily.           Past Medical History:    Chronic cough    Encounter for IUD insertion    Removed 10/2022    Essential hypertension    High blood pressure    Hx of motion sickness    Vertigo       Past Surgical History:   Procedure Laterality Date    Colonoscopy N/A 10/19/2023    Procedure: COLONOSCOPY;  Surgeon: Manda Calderon MD;  Location:  ENDOSCOPY    Endometrial biopsy - jar(s): 2  2021    Benign    Mirena, iud, 5 year  2021    Needle biopsy right  2009    stereo bx dense tissue          Other surgical history  2024    hysteroscopy, polypectomy, D&C (benign)    Tonsillectomy           Social History     Socioeconomic History     Marital status:    Occupational History     Comment:    Tobacco Use    Smoking status: Never     Passive exposure: Never    Smokeless tobacco: Never   Vaping Use    Vaping status: Never Used   Substance and Sexual Activity    Alcohol use: No    Drug use: No    Sexual activity: Yes     Partners: Male     Birth control/protection: I.U.D.   Other Topics Concern    Caffeine Concern No    Stress Concern No    Weight Concern Yes    Special Diet No    Exercise No    Seat Belt Yes               REVIEW OF SYSTEMS:  GENERAL HEALTH: feels well otherwise, mood is good  SKIN: denies any unusual skin lesions or rashes  RESPIRATORY: denies shortness of breath with exertion, no cough  CARDIOVASCULAR: denies chest pain on exertion  GI: denies abdominal pain and denies heartburn  : normal bladder  NEURO: denies headaches, denies dizziness    EXAM:  /68   Pulse 88   Temp 97.8 °F (36.6 °C) (Temporal)   Resp 16   Ht 5' 3\" (1.6 m)   Wt 136 lb (61.7 kg)   LMP  (LMP Unknown)   SpO2 98%   BMI 24.09 kg/m²   Wt Readings from Last 6 Encounters:   09/04/24 136 lb (61.7 kg)   04/25/24 132 lb 11.5 oz (60.2 kg)   04/19/24 135 lb (61.2 kg)   03/18/24 134 lb (60.8 kg)   03/16/24 140 lb (63.5 kg)   03/06/24 141 lb (64 kg)     GENERAL: well developed, well nourished,in no apparent distress, pleasant affect  SKIN: no rashes,no suspicious lesions  HEENT: atraumatic, normocephalic,  Conj clear, EOMI, PERRL  TMs:clear bilat  OMM, throat clear  NECK: supple,no adenopathy,noTM  LUNGS: clear to auscultation bilaterally, no wheezing.  Intermittent dry cough  CARDIO: RRR without murmur  GI: NABS, soft, mild mid and lower abdominal tenderness, inconsistent, no rebound, no guarding no masses, no HSM, ND  EXTREMITIES: no cyanosis, clubbing or edema  NEURO: A&O x3, no focal deficits  Normal gait  Breasts; pendulous.  Bilateral scattered fibrocystic changes, no dominant masses, no nipple or skin changes, no axillary or  supraclavicular lymphadenopathy.  Pelvic: Deferred (sees gynecologist)    ASSESSMENT AND PLAN:    1. Routine physical examination  Reviewed diet/exercise/skin care/safety/BSE/BMI goals  Reviewed immunizations:Tdap up-to-date.  Advised annual flu vaccine seasonally.  Discussed COVID-19 booster.  Advised Shingrix vaccine series, patient defers, understanding risk of not being vaccinated.  PAP smear up-to-date.  Last done 7/2022, normal, HPV negative  MMG is due.  Patient defers as does not have insurance at this time.  [place] and will get done while she is there.  Colonoscopy up-to-date.    2. Laboratory examination ordered as part of a complete physical examination  - CBC With Differential With Platelet  - Comp Metabolic Panel (14)  - Lipid Panel  - Hemoglobin A1C  - TSH W Reflex To Free T4    3. Essential hypertension  Blood pressure controlled.  Continue current medication.  - losartan 25 MG Oral Tab; TAKE 1 TABLET BY MOUTH EVERY DAY  Dispense: 90 tablet; Refill: 1    4. Laryngopharyngeal reflux (LPR)  Omeprazole 20 mg daily in AM's as directed.  Avoid possible dietary triggers.  Monitor.    5. Chronic cough  Likely due to LPR and/or reactive airways.  Try Airsupra MDI 2 puffs twice daily for 5 days, then twice daily as needed.  Rinse mouth after use.(Sample given)  Omeprazole daily as above should also help.  Call or follow-up if symptoms persist or worsen    6. Other hemorrhoids  Increase fiber in diet.  Over-the-counter hemorrhoidal treatments i.e. Preparation H as needed  Up-to-date with colonoscopy.    7. Situational anxiety  Counseled on coping mechanisms for anxiety.  Clonazepam twice daily as needed.  Warned of possible sedation.  If AM dose causes too much sedation, can take half tablet daily.  Defers formal counseling.  - clonazePAM 0.5 MG Oral Tab; 1 po bid/prn  Dispense: 30 tablet; Refill: 0    8.  Vitamin B12 deficiency   Slightly low B12 level with previous  labs.  Recommend vitamin B12 supplement, 500 mcg daily    9.  Encounter for medication monitoring

## 2024-10-25 ENCOUNTER — HOSPITAL ENCOUNTER (OUTPATIENT)
Age: 59
Discharge: ANOTHER HOSPITAL | End: 2024-10-25

## 2024-10-25 ENCOUNTER — HOSPITAL ENCOUNTER (EMERGENCY)
Facility: HOSPITAL | Age: 59
Discharge: HOME | End: 2024-10-25
Attending: EMERGENCY MEDICINE

## 2024-10-25 ENCOUNTER — APPOINTMENT (OUTPATIENT)
Dept: GENERAL RADIOLOGY | Facility: HOSPITAL | Age: 59
End: 2024-10-25
Attending: EMERGENCY MEDICINE

## 2024-10-25 VITALS
TEMPERATURE: 98 F | BODY MASS INDEX: 23.92 KG/M2 | OXYGEN SATURATION: 97 % | HEART RATE: 104 BPM | RESPIRATION RATE: 18 BRPM | SYSTOLIC BLOOD PRESSURE: 157 MMHG | DIASTOLIC BLOOD PRESSURE: 89 MMHG | WEIGHT: 135 LBS | HEIGHT: 63 IN

## 2024-10-25 VITALS
BODY MASS INDEX: 23.92 KG/M2 | RESPIRATION RATE: 18 BRPM | SYSTOLIC BLOOD PRESSURE: 127 MMHG | DIASTOLIC BLOOD PRESSURE: 85 MMHG | OXYGEN SATURATION: 100 % | HEIGHT: 63 IN | WEIGHT: 135 LBS | HEART RATE: 70 BPM | TEMPERATURE: 98 F

## 2024-10-25 DIAGNOSIS — R00.2 PALPITATIONS: Primary | ICD-10-CM

## 2024-10-25 DIAGNOSIS — R06.09 DYSPNEA ON EXERTION: ICD-10-CM

## 2024-10-25 LAB
ALBUMIN SERPL-MCNC: 4.9 G/DL (ref 3.2–4.8)
ALBUMIN/GLOB SERPL: 1.9 {RATIO} (ref 1–2)
ALP LIVER SERPL-CCNC: 63 U/L
ALT SERPL-CCNC: 13 U/L
ANION GAP SERPL CALC-SCNC: 2 MMOL/L (ref 0–18)
AST SERPL-CCNC: 18 U/L (ref ?–34)
ATRIAL RATE: 100 BPM
ATRIAL RATE: 115 BPM
BASOPHILS # BLD AUTO: 0.01 X10(3) UL (ref 0–0.2)
BASOPHILS NFR BLD AUTO: 0.2 %
BILIRUB SERPL-MCNC: 0.5 MG/DL (ref 0.3–1.2)
BUN BLD-MCNC: 10 MG/DL (ref 9–23)
CALCIUM BLD-MCNC: 10.1 MG/DL (ref 8.7–10.4)
CHLORIDE SERPL-SCNC: 106 MMOL/L (ref 98–112)
CO2 SERPL-SCNC: 29 MMOL/L (ref 21–32)
CREAT BLD-MCNC: 0.75 MG/DL
EGFRCR SERPLBLD CKD-EPI 2021: 92 ML/MIN/1.73M2 (ref 60–?)
EOSINOPHIL # BLD AUTO: 0.1 X10(3) UL (ref 0–0.7)
EOSINOPHIL NFR BLD AUTO: 2.2 %
ERYTHROCYTE [DISTWIDTH] IN BLOOD BY AUTOMATED COUNT: 13 %
GLOBULIN PLAS-MCNC: 2.6 G/DL (ref 2–3.5)
GLUCOSE BLD-MCNC: 100 MG/DL (ref 70–99)
HCT VFR BLD AUTO: 45 %
HGB BLD-MCNC: 14.7 G/DL
IMM GRANULOCYTES # BLD AUTO: 0.01 X10(3) UL (ref 0–1)
IMM GRANULOCYTES NFR BLD: 0.2 %
LYMPHOCYTES # BLD AUTO: 1.73 X10(3) UL (ref 1–4)
LYMPHOCYTES NFR BLD AUTO: 38.9 %
MAGNESIUM SERPL-MCNC: 1.9 MG/DL (ref 1.6–2.6)
MCH RBC QN AUTO: 29 PG (ref 26–34)
MCHC RBC AUTO-ENTMCNC: 32.7 G/DL (ref 31–37)
MCV RBC AUTO: 88.8 FL
MONOCYTES # BLD AUTO: 0.46 X10(3) UL (ref 0.1–1)
MONOCYTES NFR BLD AUTO: 10.3 %
NEUTROPHILS # BLD AUTO: 2.14 X10 (3) UL (ref 1.5–7.7)
NEUTROPHILS # BLD AUTO: 2.14 X10(3) UL (ref 1.5–7.7)
NEUTROPHILS NFR BLD AUTO: 48.2 %
OSMOLALITY SERPL CALC.SUM OF ELEC: 283 MOSM/KG (ref 275–295)
P AXIS: 64 DEGREES
P AXIS: 68 DEGREES
P-R INTERVAL: 154 MS
P-R INTERVAL: 160 MS
PLATELET # BLD AUTO: 208 10(3)UL (ref 150–450)
POTASSIUM SERPL-SCNC: 3.9 MMOL/L (ref 3.5–5.1)
PROT SERPL-MCNC: 7.5 G/DL (ref 5.7–8.2)
Q-T INTERVAL: 330 MS
Q-T INTERVAL: 362 MS
QRS DURATION: 102 MS
QRS DURATION: 90 MS
QTC CALCULATION (BEZET): 456 MS
QTC CALCULATION (BEZET): 466 MS
R AXIS: -53 DEGREES
R AXIS: -57 DEGREES
RBC # BLD AUTO: 5.07 X10(6)UL
SODIUM SERPL-SCNC: 137 MMOL/L (ref 136–145)
T AXIS: 40 DEGREES
T AXIS: 41 DEGREES
TROPONIN I SERPL HS-MCNC: <3 NG/L
TSI SER-ACNC: 1.25 MIU/ML (ref 0.55–4.78)
VENTRICULAR RATE: 100 BPM
VENTRICULAR RATE: 115 BPM
WBC # BLD AUTO: 4.5 X10(3) UL (ref 4–11)

## 2024-10-25 PROCEDURE — 84443 ASSAY THYROID STIM HORMONE: CPT | Performed by: EMERGENCY MEDICINE

## 2024-10-25 PROCEDURE — 83735 ASSAY OF MAGNESIUM: CPT | Performed by: EMERGENCY MEDICINE

## 2024-10-25 PROCEDURE — 99284 EMERGENCY DEPT VISIT MOD MDM: CPT

## 2024-10-25 PROCEDURE — 99285 EMERGENCY DEPT VISIT HI MDM: CPT

## 2024-10-25 PROCEDURE — 93005 ELECTROCARDIOGRAM TRACING: CPT

## 2024-10-25 PROCEDURE — 99214 OFFICE O/P EST MOD 30 MIN: CPT

## 2024-10-25 PROCEDURE — 93010 ELECTROCARDIOGRAM REPORT: CPT

## 2024-10-25 PROCEDURE — 80053 COMPREHEN METABOLIC PANEL: CPT | Performed by: EMERGENCY MEDICINE

## 2024-10-25 PROCEDURE — 71045 X-RAY EXAM CHEST 1 VIEW: CPT | Performed by: EMERGENCY MEDICINE

## 2024-10-25 PROCEDURE — 36415 COLL VENOUS BLD VENIPUNCTURE: CPT

## 2024-10-25 PROCEDURE — 84484 ASSAY OF TROPONIN QUANT: CPT | Performed by: EMERGENCY MEDICINE

## 2024-10-25 PROCEDURE — 85025 COMPLETE CBC W/AUTO DIFF WBC: CPT | Performed by: EMERGENCY MEDICINE

## 2024-10-25 NOTE — CM/SW NOTE
EDCM asked to see patient as she is self pay and has concerns and questions over billing. Discussed with and gave patient the number for billing and financial assistance. Also gave patient resources to [employer], low cost prescription options, medicaid, [employer], and an [facility] financial aide application.

## 2024-10-25 NOTE — ED INITIAL ASSESSMENT (HPI)
Palpitations- started Monday   states it has been constant. Denies achy chest pain or shortness of breath.  Cough - x 1 week. States  she has chronic cough and PCP is aware.  Denies fever.

## 2024-10-25 NOTE — ED INITIAL ASSESSMENT (HPI)
Pt presents to the ED with c/o constant palpitations since Monday. Pt  denies chest pain. Pt does report some cough. Pt awake and alert, skin w/d,resps reg/unlabored.

## 2024-10-25 NOTE — ED PROVIDER NOTES
Patient Seen in: [facility]      History     Chief Complaint   Patient presents with    Palpitations     Stated Complaint: palpatations    Subjective:   HPI  58-year-old female with chronic cough, hypertension, and vertigo presents of worsening cough with feeling skipped beats and palpitations since Monday.  She also is having shortness of breath with exertion.  She denies any personal history of blood clots.  She states her father did have a blood clot but was on dialysis.  She denies any leg pain or swelling.  She denies any chest pain.  She denies any hormone use.    Objective:     Past Medical History:    Chronic cough    Encounter for IUD insertion    Removed 10/2022    Essential hypertension    High blood pressure    Hx of motion sickness    Vertigo              Past Surgical History:   Procedure Laterality Date    Colonoscopy N/A 10/19/2023    Procedure: COLONOSCOPY;  Surgeon: Manda Calderon MD;  Location:  ENDOSCOPY    Endometrial biopsy - jar(s): 2  2021    Benign    Mirena, iud, 5 year  2021    Needle biopsy right      stereo bx dense tissue          Other surgical history  2024    hysteroscopy, polypectomy, D&C (benign)    Tonsillectomy                  Social History     Socioeconomic History    Marital status:    Occupational History     Comment:    Tobacco Use    Smoking status: Never     Passive exposure: Never    Smokeless tobacco: Never   Vaping Use    Vaping status: Never Used   Substance and Sexual Activity    Alcohol use: No    Drug use: No    Sexual activity: Yes     Partners: Male     Birth control/protection: I.U.D.   Other Topics Concern    Caffeine Concern No    Stress Concern No    Weight Concern Yes    Special Diet No    Exercise No    Seat Belt Yes              Review of Systems   All other systems reviewed and are negative.      Positive for stated complaint: palpatations  Other systems are as noted in  HPI.  Constitutional and vital signs reviewed.      All other systems reviewed and negative except as noted above.    Physical Exam     ED Triage Vitals [10/25/24 0915]   /89   Pulse 104   Resp 18   Temp 97.9 °F (36.6 °C)   Temp src Temporal   SpO2 97 %   O2 Device None (Room air)       Current Vitals:   Vital Signs  BP: 157/89  Pulse: 104  Resp: 18  Temp: 97.9 °F (36.6 °C)  Temp src: Temporal    Oxygen Therapy  SpO2: 97 %  O2 Device: None (Room air)        Physical Exam  Vitals and nursing note reviewed.   Constitutional:       General: She is not in acute distress.     Appearance: She is well-developed. She is not ill-appearing or toxic-appearing.   Cardiovascular:      Rate and Rhythm: Regular rhythm. Tachycardia present.      Heart sounds: Normal heart sounds.   Pulmonary:      Effort: Pulmonary effort is normal.      Breath sounds: Normal breath sounds.   Musculoskeletal:      Right lower leg: No edema.      Left lower leg: No edema.   Skin:     General: Skin is warm and dry.   Neurological:      Mental Status: She is alert and oriented to person, place, and time.             ED Course   Labs Reviewed - No data to display  EKG    Rate, intervals and axes as noted on EKG Report.  Rate: 115  Rhythm: Sinus tachycardia  Reading: No acute ST changes                Heart Score:    HEART Score      Title      Criteria Score   Age: 45-64 Age Score: 1   History: Slightly Suspicious Hx Score: 0     HTN: Yes   Hypercholesterolemia: Yes   Atherosclerosis/PVD: No     DM: No   BMI>30kg/m2: No   Smoking: No   Family History: No         Other Risk Factor Score: 2                     HEART Score: 2        Risk of adverse cardiac event is 0.9-1.7%                 MDM     Medical Decision Making  58-year-old female with chronic cough, hypertension, and vertigo presents of worsening cough with feeling skipped beats and palpitations since Monday.  She also is having shortness of breath with exertion.  She denies any personal  history of blood clots.  She states her father did have a blood clot but was on dialysis.  She denies any leg pain or swelling.  She denies any chest pain.  She denies any hormone use.      Patient with tachycardia and reporting dyspnea on exertion.  She also has a chronic cough which has worsened over the past couple weeks.  She will be sent to the emergency room for further evaluation.  Patient declined ambulance transfer.        Disposition and Plan     Clinical Impression:  1. Palpitations    2. Dyspnea on exertion         Disposition:  Ic to ed  10/25/2024  9:29 am    Follow-up:  No follow-up provider specified.        Medications Prescribed:  Current Discharge Medication List              Supplementary Documentation:

## 2024-10-25 NOTE — DISCHARGE INSTRUCTIONS
To Discuss Billing and payment plans please call [phone]    For discounted prescription drug coupns go to [employer]

## 2024-10-25 NOTE — ED PROVIDER NOTES
Patient Seen in: [facility] Emergency Department      History     Chief Complaint   Patient presents with    Arrythmia/Palpitations     Stated Complaint: palpitations since Monday, sent from  for further evaluation    Subjective:   HPI      Patient is a 58-year-old female with a history of hypertension/intermittent palpitations for the last 5 days.  Is not a rapid rate but occasional hard, irregular beats with a pause after that.  Feels like she should have has to catch her breath after it happens.  No chest pain or tightness.  Not worse with exertion, she seems to notice it more in the morning and seems a little bit better as the day goes on.  She has had palpitations in the past or evaluate the Holter monitor but that was more of a rapid rate, this feels different than that did.  No new medications.  No recent illnesses.    Objective:     Past Medical History:    Chronic cough    Encounter for IUD insertion    Removed 10/2022    Essential hypertension    High blood pressure    Hx of motion sickness    Vertigo              Past Surgical History:   Procedure Laterality Date    Colonoscopy N/A 10/19/2023    Procedure: COLONOSCOPY;  Surgeon: Manda Calderon MD;  Location:  ENDOSCOPY    Endometrial biopsy - jar(s): 2  2021    Benign    Mirena, iud, 5 year  2021    Needle biopsy right      stereo bx dense tissue          Other surgical history  2024    hysteroscopy, polypectomy, D&C (benign)    Tonsillectomy                  Social History     Socioeconomic History    Marital status:    Occupational History     Comment:    Tobacco Use    Smoking status: Never     Passive exposure: Never    Smokeless tobacco: Never   Vaping Use    Vaping status: Never Used   Substance and Sexual Activity    Alcohol use: No    Drug use: No    Sexual activity: Yes     Partners: Male     Birth control/protection: I.U.D.   Other Topics Concern    Caffeine Concern No    Stress  Concern No    Weight Concern Yes    Special Diet No    Exercise No    Seat Belt Yes                  Physical Exam     ED Triage Vitals [10/25/24 1012]   BP (!) 145/93   Pulse 99   Resp 16   Temp 98.1 °F (36.7 °C)   Temp src Oral   SpO2 100 %   O2 Device None (Room air)       Current Vitals:   Vital Signs  BP: 127/85  Pulse: 70  Resp: 18  Temp: 98.1 °F (36.7 °C)  Temp src: Oral  MAP (mmHg): 100    Oxygen Therapy  SpO2: 100 %  O2 Device: None (Room air)        Physical Exam  Vitals and nursing note reviewed.   Constitutional:       Appearance: She is well-developed.   HENT:      Head: Normocephalic and atraumatic.   Eyes:      Conjunctiva/sclera: Conjunctivae normal.      Pupils: Pupils are equal, round, and reactive to light.   Cardiovascular:      Rate and Rhythm: Normal rate and regular rhythm.      Heart sounds: Normal heart sounds.   Pulmonary:      Effort: Pulmonary effort is normal.      Breath sounds: Normal breath sounds.   Abdominal:      General: Bowel sounds are normal.      Palpations: Abdomen is soft.   Musculoskeletal:         General: Normal range of motion.      Cervical back: Normal range of motion and neck supple.   Skin:     General: Skin is warm and dry.   Neurological:      Mental Status: She is alert and oriented to person, place, and time.             ED Course     Labs Reviewed   COMP METABOLIC PANEL (14) - Abnormal; Notable for the following components:       Result Value    Glucose 100 (*)     Albumin 4.9 (*)     All other components within normal limits   TROPONIN I HIGH SENSITIVITY - Normal   MAGNESIUM - Normal   TSH W REFLEX TO FREE T4 - Normal   CBC WITH DIFFERENTIAL WITH PLATELET   RAINBOW DRAW LAVENDER   RAINBOW DRAW LIGHT GREEN   RAINBOW DRAW BLUE   RAINBOW DRAW GOLD     EKG    Rate, intervals and axes as noted on EKG Report.  Rate: 100  Rhythm: Sinus Rhythm  Reading: Sinus rhythm.  Slight ST depression in the lateral leads.  No elevations.  No T wave changes.  No old available for  comparison.                XR CHEST AP PORTABLE  (CPT=71045)    Result Date: 10/25/2024  PROCEDURE:  XR CHEST AP PORTABLE  (CPT=71045)  TECHNIQUE:  AP chest radiograph was obtained.  COMPARISON:  [facility], XR CHEST PA + LAT CHEST (CPT=71046), 12/29/2022, 5:34 PM.  INDICATIONS:  palpitations since Monday, sent from [facility] for further evaluation  PATIENT STATED HISTORY: (As transcribed by Technologist)  Patient states having heart palpatations.    FINDINGS:  The heart is normal in size.  The lungs are clear.  There are no pleural effusions.  The bones are unremarkable.            CONCLUSION:  No acute disease.    LOCATION:  [facility]      Dictated by (CST): Lenin Fitzgerald MD on 10/25/2024 at 11:33 AM     Finalized by (CST): Lenin Fitzgerald MD on 10/25/2024 at 11:34 AM             MDM      58-year-old female presenting for evaluation of palpitations all week.  Not a rapid rate but more of hard irregular beats.  From her description sounds most consistent with PVCs.  Not worse with exertion and no chest pain or tightness, overall does not look concerning for angina.  Labs ordered to evaluate for electrolyte abnormality, thyroid, ACS.      Update at 1:05 PM.  Labs are unremarkable.  Patient continues to feel intermittent irregular beats here.  Negative troponin I think ACS or cardiac etiology has been ruled out.  Sounds most consistent with PVCs.  Patient is comfortable going home.  I discussed possibility of going for outpatient Holter monitor and even that we could get it placed today but the patient would prefer to follow-up with her primary care physician to discuss further evaluation.        Past Medical History-hypertension    Differential diagnosis before testing included electrolyte abnormality, ACS    Co-morbidities that add to the complexity of management include: None    Testing ordered during this visit included labs, chest x-ray    Radiographic images  I personally reviewed the radiographs and my individual  interpretation shows no infiltrate or pneumothorax  I also reviewed the official reports that showed no infiltrate or pneumothorax              Disposition:          Discharge  I have discussed with the patient the results of test, differential diagnosis, treatment plan, warning signs and symptoms which should prompt immediate return.  They expressed understanding of these instructions and agrees to the following plan provided.  They were given written discharge instructions and agrees to return for any concerns and voiced understanding and all questions were answered.    Medical Decision Making      Disposition and Plan     Clinical Impression:  1. Palpitations         Disposition:  Discharge  10/25/2024  1:10 pm    Follow-up:  Skyler Hand MD  [address] [place] [id number]  [phone]    Follow up            Medications Prescribed:  Current Discharge Medication List              Supplementary Documentation:

## 2024-10-25 NOTE — ED QUICK NOTES
Rounding Completed    Plan of Care reviewed. Waiting for discharge to home.  Elimination needs assessed, patient ambulatory to and from restroom without difficulty.   Bed is locked and in lowest position. Call light within reach.

## 2024-11-07 ENCOUNTER — TELEPHONE (OUTPATIENT)
Dept: FAMILY MEDICINE CLINIC | Facility: CLINIC | Age: 59
End: 2024-11-07

## 2024-11-07 DIAGNOSIS — R00.0 TACHYCARDIA: ICD-10-CM

## 2024-11-07 DIAGNOSIS — R00.2 PALPITATIONS: Primary | ICD-10-CM

## 2024-11-07 DIAGNOSIS — I10 ESSENTIAL HYPERTENSION: ICD-10-CM

## 2024-11-07 NOTE — TELEPHONE ENCOUNTER
Triage call transferred.   Spoke with pt stating seen in ER for palpitations 10/25/24.  MDM   58-year-old female presenting for evaluation of palpitations all week.  Not a rapid rate but more of hard irregular beats.  From her description sounds most consistent with PVCs.  Not worse with exertion and no chest pain or tightness, overall does not look concerning for angina.  Labs ordered to evaluate for electrolyte abnormality, thyroid, ACS.  Update at 1:05 PM.  Labs are unremarkable.  Patient continues to feel intermittent irregular beats here.  Negative troponin I think ACS or cardiac etiology has been ruled out.  Sounds most consistent with PVCs.  Patient is comfortable going home.  I discussed possibility of going for outpatient Holter monitor and even that we could get it placed today but the patient would prefer to follow-up with her primary care physician to discuss further evaluation.    Pt taking old prescription Metoprolol 25MG that helps reduce palpations.   Per pt, no insurance, will pay OOP.   No availability with PCP until 11/19.  Pt inquiring if PCP willing to accommodate pt in schedule or recommends f/u with Cardiologist- who should pt see?  Informed will relay to PCP for further recommendations. Pt aware PCP will return tomorrow (11/8). Pt will go to UC/ER if any worsening sx. No further questions or concerns. Pt verbalized understanding and agreed with POC.    Please advise. Thank you.

## 2024-11-08 NOTE — TELEPHONE ENCOUNTER
Called and spoke with pt. Pt has apt 11/13/24 with Dr. Montero. Pt has enough and since she is seeing cardio on Wednesday declined refill of rx. Stated she doesn't want to pay for med refill in case she is prescribed a different medication after seeing cardio. Appreciative of call and f/u.     FYI Dr. Hand

## 2024-11-08 NOTE — TELEPHONE ENCOUNTER
Per [place]  review pt has Rx for Metoprolol Tartrate 25mg bid prescribed. She should still have this. If not ,ok to give rx for medication and she can take 1 po bid. (#30, 1 Refill)

## 2024-11-08 NOTE — TELEPHONE ENCOUNTER
Called and spoke with pt. Notified pt of recommendations from Dr. Hand. Provided [facility] number and advised to schedule with soonest available provider.  Pt stated last night her heart was beating fast and so she took a full 25mg tab of Metoprolol. This is her daughters medication, has never been prescribed Metoprolol. Took 12.5mg (half tab) in the morning and then 25mg at night. Wondering if this is ok. Pt stated she was not given any rx when she was in ER. Advised to keep us updated on cardio apt.     Dr. Hand - metoprolol is her daughters prescription. Took 12.5mg in the AM and 25mg in the evening. Will keep us updated on when cardio can see her

## 2024-11-08 NOTE — TELEPHONE ENCOUNTER
Pt will need to see Cardiology  Should continue Metoprolol at prescribed dosing  Referral for Cardiology entered. She can see any of the providers at [facility] that is soonest available.

## 2024-12-04 NOTE — TELEPHONE ENCOUNTER
Silvia Chi requesting Medication Refill for:    Medication name and dose (copy and paste from medication list):   Medication Quantity Refills Start End   loratadine (CLARITIN) 10 MG Oral Tab (Discontinued) 30 tablet 3 1/11/2023 9/4/2024   Sig:   Take 1 tablet (10 mg total) by mouth daily.     Patient not taking:   Reported on 9/4/2024     Route:   Oral     Order #:   [id number]         If medication is not on medication list - transfer patient to RN queue for triage    Preferred Pharmacy: [facility] on file    LOV: 9/4/2024   Last Refill date: 01/11/2023  Next Scheduled appointment:

## 2024-12-05 RX ORDER — LORATADINE 10 MG/1
10 TABLET ORAL DAILY
Qty: 30 TABLET | Refills: 3 | Status: SHIPPED | OUTPATIENT
Start: 2024-12-05

## 2024-12-05 NOTE — TELEPHONE ENCOUNTER
Please review. Protocol Pass    Patient is requesting     Requested Prescriptions   Pending Prescriptions Disp Refills    loratadine (CLARITIN) 10 MG Oral Tab 30 tablet 3     Sig: Take 1 tablet (10 mg total) by mouth daily.       Allergy Medication Protocol Passed - 12/5/2024  1:45 PM        Passed - In person appointment or virtual visit in the past 12 mos or appointment in next 3 mos     Recent Outpatient Visits              3 months ago Routine physical examination    [facility], [address] [place], [place] [address]Skyler MD    Office Visit    7 months ago     [facility], Berto TURCIOS, PT    Office Visit    8 months ago     [facility], Berto TURCIOS, PT    Office Visit    8 months ago     [facility], Berto TURCIOS, PT    Office Visit    8 months ago Left shoulder tendinitis    [facility], Berto TURCIOS, PT    Office Visit                               Recent Outpatient Visits              3 months ago Routine physical examination    [facility], [address] [place] [address] Skyler Hand MD    Office Visit    7 months ago     [facility], Berto TURCIOS, PT    Office Visit    8 months ago     [facility], Berto TURCIOS, PT    Office Visit    8 months ago     [facility], Berto TURCIOS, PT    Office Visit    8 months ago Left shoulder tendinitis    [facility], Berto TURCIOS, PT    Office Visit

## 2024-12-05 NOTE — TELEPHONE ENCOUNTER
Pended. Routed to pool to run protocol.    Sending high priority as patient is out of medication.

## 2025-05-06 ENCOUNTER — PATIENT MESSAGE (OUTPATIENT)
Dept: FAMILY MEDICINE CLINIC | Facility: CLINIC | Age: 60
End: 2025-05-06

## 2025-05-09 ENCOUNTER — TELEPHONE (OUTPATIENT)
Dept: FAMILY MEDICINE CLINIC | Facility: CLINIC | Age: 60
End: 2025-05-09

## 2025-05-09 LAB
AMB EXT BILIRUBIN, TOTAL: 0.6 MG/DL
AMB EXT BUN: 11 MG/DL
AMB EXT CALCIUM: 10
AMB EXT CARBON DIOXIDE: 31
AMB EXT CHLORIDE: 1
AMB EXT CHOL/HDL RATIO: 4
AMB EXT CHOLESTEROL, TOTAL: 250 MG/DL (ref ?–200)
AMB EXT CMP ALT: 11 U/L
AMB EXT CMP AST: 12 U/L
AMB EXT CREATININE: 0.74 MG/DL
AMB EXT GLUCOSE: 99 MG/DL
AMB EXT HDL CHOLESTEROL: 63 MG/DL
AMB EXT HEMATOCRIT: 44.8
AMB EXT HEMOGLOBIN: 14.3
AMB EXT HGBA1C: 6.1 %
AMB EXT LDL CHOLESTEROL, DIRECT: 159 MG/DL (ref ?–100)
AMB EXT MCV: 90.1
AMB EXT NON HDL CHOL: 187 MG/DL (ref ?–130)
AMB EXT PLATELETS: 242
AMB EXT POSTASSIUM: 4.3 MMOL/L
AMB EXT SODIUM: 140 MMOL/L
AMB EXT TOTAL PROTEIN: 7.1
AMB EXT TRIGLYCERIDES: 152 MG/DL (ref ?–150)
AMB EXT TSH: 1.28 MIU/ML
AMB EXT WBC: 3.8 X10(3)UL

## 2025-05-21 ENCOUNTER — PATIENT MESSAGE (OUTPATIENT)
Dept: FAMILY MEDICINE CLINIC | Facility: CLINIC | Age: 60
End: 2025-05-21

## 2025-05-22 NOTE — TELEPHONE ENCOUNTER
Noted multiple abnormalities on labs.  Please schedule appt to review results and and is also overdue for follow-up on medications/blood pressure.

## 2025-05-28 ENCOUNTER — OFFICE VISIT (OUTPATIENT)
Dept: FAMILY MEDICINE CLINIC | Facility: CLINIC | Age: 60
End: 2025-05-28

## 2025-05-28 VITALS
HEART RATE: 80 BPM | TEMPERATURE: 98 F | DIASTOLIC BLOOD PRESSURE: 62 MMHG | BODY MASS INDEX: 23.74 KG/M2 | HEIGHT: 63 IN | SYSTOLIC BLOOD PRESSURE: 110 MMHG | OXYGEN SATURATION: 98 % | RESPIRATION RATE: 18 BRPM | WEIGHT: 134 LBS

## 2025-05-28 DIAGNOSIS — R73.03 PREDIABETES: ICD-10-CM

## 2025-05-28 DIAGNOSIS — Z51.81 ENCOUNTER FOR MEDICATION MONITORING: ICD-10-CM

## 2025-05-28 DIAGNOSIS — I10 ESSENTIAL HYPERTENSION: Primary | ICD-10-CM

## 2025-05-28 DIAGNOSIS — R05.3 CHRONIC COUGH: ICD-10-CM

## 2025-05-28 DIAGNOSIS — L71.9 ROSACEA: ICD-10-CM

## 2025-05-28 DIAGNOSIS — R00.2 PALPITATIONS: ICD-10-CM

## 2025-05-28 DIAGNOSIS — E78.49 OTHER HYPERLIPIDEMIA: ICD-10-CM

## 2025-05-28 RX ORDER — METRONIDAZOLE 10 MG/G
GEL TOPICAL 2 TIMES DAILY
Qty: 30 G | Refills: 3 | Status: CANCELLED | OUTPATIENT
Start: 2025-05-28 | End: 2025-09-25

## 2025-05-28 RX ORDER — LOSARTAN POTASSIUM 25 MG/1
TABLET ORAL
Qty: 90 TABLET | Refills: 3 | Status: SHIPPED | OUTPATIENT
Start: 2025-05-28

## 2025-05-28 RX ORDER — METOPROLOL SUCCINATE 25 MG/1
25 TABLET, EXTENDED RELEASE ORAL DAILY
COMMUNITY

## 2025-05-28 NOTE — PROGRESS NOTES
Silvia Chi is a 59 year old female.    History of Present Illness  Silvia Chi is a 59 year old female with hypertension  who presents for follow-up on medication and test results.      For a few months, she has been experiencing palpitations, described as feeling like 'skipping beats' and 'boom', which prompted a visit to the ER on 10/25/2024. This episode occurred approximately 15 days after discontinuing the inhaler Airsupra. An EKG at the ER showed premature beats, but no significant abnormalities were found. She planned to have an echocardiogram while she traveled overseas in [place] but forgot. Unable to get done here due to lack of insurance  Sxs not related to activity.    She is currently taking metoprolol ER 25 mg once daily to manage her palpitations. She sometimes cuts the dose in half if she feels she doesn't need the full dose.  Denies any lightheadedness or dizziness.  Overall less frequent palpitations, but not resolved.  Denies any chest pain or shortness of breath.  Saw Cardiology    She reports a rash on her face, described as bumpy and itchy, more prominent on the left cheek, symptoms for few weeks.  No new skin products.    She has a history of hypertension and is taking losartan daily, with her blood pressure reported as well-controlled.   She had labs done recently, on 5/9/2025 with increase in her cholesterol levels, with her LDL rising from 128 to 159, and her total cholesterol from 211 to 250. Her HDL remains high.    She has been informed of her prediabetes status, with her A1c increasing from 5.2 to 6.1. She acknowledges a diet high in sugars and carbs, particularly sweets and bread, and is attempting to cut down. She tries to exercise by walking but admits to struggling with dietary changes.    Family history includes a father who had a stroke at age 73.  No history of GDM, has paternal aunt with diabetes     She reports a chronic, intermittent cough, which she attributes to  seasonal changes, and is not currently taking any antihistamines.  She also mentions experiencing cold sores, which she associates with stress and biting her lips.    Mood is overall good. Some personal stressors with wedding planning for dtr and travel overseas for event, but o/w doing ok.       Current Outpatient Medications   Medication Sig Dispense Refill          . Metoprolol ER 25 mg 1 tablet daily      losartan 25 MG Oral Tab TAKE 1 TABLET BY MOUTH EVERY DAY 90 tablet 3    loratadine (CLARITIN) 10 MG Oral Tab Take 1 tablet (10 mg total) by mouth daily. 30 tablet 3    Ergocalciferol (VITAMIN D OR) Take 1 tablet by mouth daily.      clonazePAM 0.5 MG Oral Tab 1 po bid/prn (Patient not taking: Reported on 2025) 30 tablet 0         Past Medical History:    Chronic cough    Encounter for IUD insertion    Removed 10/2022    Essential hypertension    High blood pressure    Hx of motion sickness    Vertigo       Past Surgical History:   Procedure Laterality Date    Colonoscopy N/A 10/19/2023    Procedure: COLONOSCOPY;  Surgeon: Manda Calderon MD;  Location:  ENDOSCOPY    Endometrial biopsy - jar(s): 2  2021    Benign    Mirena, iud, 5 year  2021    Needle biopsy right  2009    stereo bx dense tissue          Other surgical history  2024    hysteroscopy, polypectomy, D&C (benign)    Tonsillectomy           Social History     Socioeconomic History    Marital status:    Occupational History     Comment:    Tobacco Use    Smoking status: Never     Passive exposure: Never    Smokeless tobacco: Never   Vaping Use    Vaping status: Never Used   Substance and Sexual Activity    Alcohol use: No    Drug use: No    Sexual activity: Yes     Partners: Male     Birth control/protection: I.U.D.   Other Topics Concern    Caffeine Concern No    Stress Concern No    Weight Concern Yes    Special Diet No    Exercise No    Seat Belt Yes     Social Drivers of Health     Food  Insecurity: No Food Insecurity (5/28/2025)    NCSS - Food Insecurity     Worried About Running Out of Food in the Last Year: No     Ran Out of Food in the Last Year: No   Transportation Needs: No Transportation Needs (5/28/2025)    NCSS - Transportation     Lack of Transportation: No   Housing Stability: Not At Risk (5/28/2025)    NCSS - Housing/Utilities     Has Housing: Yes     Worried About Losing Housing: No     Unable to Get Utilities: No                   REVIEW OF SYSTEMS:  GENERAL HEALTH: feels well otherwise, mood is good  SKIN: As above  RESPIRATORY: denies shortness of breath with exertion  CARDIOVASCULAR: As above  GI: denies abdominal pain and denies heartburn  : normal bladder  NEURO: denies headaches, denies dizziness    EXAM:  /62   Pulse 80   Temp 98.2 °F (36.8 °C) (Temporal)   Resp 18   Ht 5' 3\" (1.6 m)   Wt 134 lb (60.8 kg)   LMP  (LMP Unknown)   SpO2 98%   BMI 23.74 kg/m²   Wt Readings from Last 6 Encounters:   05/28/25 134 lb (60.8 kg)   10/25/24 135 lb (61.2 kg)   10/25/24 135 lb (61.2 kg)   09/04/24 136 lb (61.7 kg)   04/25/24 132 lb 11.5 oz (60.2 kg)   04/19/24 135 lb (61.2 kg)       GENERAL: well developed, well nourished,in no apparent distress, pleasant affect  SKIN: Some maculopapular erythema with prominent blood vessels of upper medial cheeks, left greater than right.  No urticarial lesions.  HEENT: atraumatic, normocephalic,  Conj clear, EOMI  OMM, throat clear  NECK: supple,no adenopathy,noTM  LUNGS: clear to auscultation  CARDIO: RRR without murmur, normal S1, S2, no ectopy  GI: NABS, soft, no tenderness, ND  EXTREMITIES: no cyanosis, clubbing or edema  NEURO: A&O x3, no focal deficits    ASSESSMENT AND PLAN:    1. Essential hypertension  Blood pressure controlled.  Continue losartan daily.  Noted recent labs with normal CMP.  - losartan 25 MG Oral Tab; TAKE 1 TABLET BY MOUTH EVERY DAY  Dispense: 90 tablet; Refill: 3    2. Palpitations  Chronic, intermittent  palpitations clinically due to PACs or PVCs.  Just had follow-up with cardiologist.   Symptoms possibly exacerbated by stress and dietary factors . advised to get Echo and event monitor but pt has not done due to lack of insurance. Planning to get testing overseas in [place] with upcoming travel.  Normal stress Echo in 2021 and 30-day event monitor from 2021 without significant findings.  Recent CBC and TFTs normal.  Prefers to avoid taking meds but understands need for medication  - can try to reduce metoprolol ER dose to half tablet daily and monitor symptoms. If notes flare-up needs to take 1 tablet daily regularly.  - Avoid high sugar intake and limit caffeine intake.  - Reassess after upcoming family wedding and travel stressors subside    3. Other hyperlipidemia  Outside labs done 5/9/2025 reviewed with LDL cholesterol increased to 159 mg/dL from 128 mg/dL. Total cholesterol increased to 250 mg/dL from 211 mg/dL. HDL is good at 63.  Triglycerides slightly elevated 152..   Reviewed diet and exercise.  Discussed risks of uncontrolled, elevated lipids.    Consider CT coronary artery calcium score to assess cardiovascular risk   - Implement dietary changes to reduce cholesterol intake.  - Increase physical activity and exercise regularly.  - Consider CT coronary artery calcium score to assess cardiovascular risk if cholesterol levels do not improve.  - Recheck lipid panel in six months.    4. Prediabetes  Fasting sugar 99.  A1c increased to 6.1, indicating prediabetes. Previous A1c was 5.2.  - Implement dietary changes to reduce sugar and carbohydrate intake.  - Increase physical activity and exercise regularly.  - Recheck A1c and fasting blood sugar in six months.    5. Rosacea  Facial rash with prominent blood vessels and itching, primarily on cheeks.  Patient wondering if symptoms possibly exacerbated by metoprolol, but clinically does not appear to be related to medication.  Likely rosacea.  Discussed  metronidazole cream and sunscreen for symptom management.  - Prescribe metronidazole cream for facial rash.  - Use facial moisturizer with sunscreen daily.  - metroNIDAZOLE (METROCREAM) 0.75 % External Cream; Apply to aa bid as directed  Dispense: 45 g; Refill: 0    6. Chronic cough  Has had full evaluation and workup in the past.  Symptoms overall improved compared to previous.  Claritin daily as needed.    7. Encounter for medication monitoring

## (undated) DEVICE — ELITE HYSTEROSCOPE SEAL: Brand: TRUCLEAR

## (undated) DEVICE — DEVICE SUCT FLR W/ TBNG WATERBUG QUIET 30 PER

## (undated) DEVICE — NEEDLE SPNL 22GA L3.5IN BLK QNCKE STYL DISP

## (undated) DEVICE — GLOVE SUR 6.5 SENSICARE PI PIP CRM PWD F

## (undated) DEVICE — MEDI-VAC NON-CONDUCTIVE SUCTION TUBING: Brand: CARDINAL HEALTH

## (undated) DEVICE — HYSTEROSCOPIC OUTFLOW TUBE SET

## (undated) DEVICE — 10FT COMBINED O2 DELIVERY/CO2 MONITORING. FILTER WITH MICROSTREAM TYPE LUER: Brand: DUAL ADULT NASAL CANNULA

## (undated) DEVICE — SPECIMEN SOCK - STANDARD: Brand: MEDI-VAC

## (undated) DEVICE — 3M™ RED DOT™ MONITORING ELECTRODE WITH FOAM TAPE AND STICKY GEL, 50/BAG, 20/CASE, 72/PLT 2570: Brand: RED DOT™

## (undated) DEVICE — 2000CC GUARDIAN II: Brand: GUARDIAN

## (undated) DEVICE — SOLUTION IRRIG 1000ML 0.9% NACL USP BTL

## (undated) DEVICE — GAUZE - RF AND X-RAY DETECTABLE USP TYPE VII, 16 PLY: Brand: SITUATE

## (undated) DEVICE — STERIS KITS

## (undated) DEVICE — 1010 S-DRAPE TOWEL DRAPE 10/BX: Brand: STERI-DRAPE™

## (undated) DEVICE — LAPAROTOMY SPONGE - RF AND X-RAY DETECTABLE PRE-WASHED: Brand: SITUATE

## (undated) DEVICE — KIT VLV 5 PC AIR H2O SUCT BX ENDOGATOR CONN

## (undated) DEVICE — SLEEVE COMPR MD KNEE LEN SGL USE KENDALL SCD

## (undated) DEVICE — 1200CC GUARDIAN II: Brand: GUARDIAN

## (undated) DEVICE — PACK PBDS CYSTOSCOPY

## (undated) DEVICE — SOLUTION IRRIG 3000ML 0.9% NACL FLX CONT

## (undated) DEVICE — SOFT TISSUE SHAVER MINI: Brand: TRUCLEAR

## (undated) DEVICE — SET TB INFLO FOR TRUCLEAR SYS HYSTEROLUX

## (undated) DEVICE — CATHETER URETH 16FR RED RUB ALL PURP INTMIT

## (undated) DEVICE — PREMIUM WET SKIN PREP TRAY: Brand: MEDLINE INDUSTRIES, INC.

## (undated) NOTE — ED AVS SNAPSHOT
Freya Guy   MRN: [id number]    Department:  [facility] Emergency Department   Date of Visit:  2/18/2018           Disclosure     Insurance plans vary and the physician(s) referred by the ER may not be covered by your plan.  Please contact you tell this physician (or your personal doctor if your instructions are to return to your personal doctor) about any new or lasting problems. The primary care or specialist physician will see patients referred from the [facility] Emergency Department.  Chris Soriano

## (undated) NOTE — MR AVS SNAPSHOT
[facility] Utilities  [address] [place], [facility] [place] [phone]               Thank you for choosing us for your health care visit with Henny Osman DO.   We are glad to serve you and happy to numbers can create reimbursement difficulties for you.     Assoc Dx:  Encounter for screening colonoscopy [Z12.11]          Reason for Today's Visit     Lab Results           Medical Issues Discussed Today     Hyperlipidemia    Vitamin D deficiency    Sergio test; or a stool DNA test as often as your health care provider advises; talk with your health care provider about which tests are best for you   Depression All women in this age group At routine exams   Gonorrhea Sexually active women at increased risk fo Measles, mumps, rubella (MMR) Women in this age group through their late 46s who have no record of these infections or vaccines 1 dose   Meningococcal Women at increased risk for infection – talk with your healthcare provider 1 or more doses   Pneumococcal No Known Allergies                Today's Vital Signs     BP Pulse Temp Height Weight BMI    116/82 mmHg 78 98.4 °F (36.9 °C) (Oral) 63\" 139 lb 24.63 kg/m2         Current Medications          This list is accurate as of: 4/26/17 12:02 PM.  Always use yo

## (undated) NOTE — LETTER
10/09/20        Ksenia Rodriguez [id number]      Dear Trinidad George,    [facility] records indicate that you have outstanding lab work and or testing that was ordered for you and has not yet been completed:  Orders Placed This Encounter

## (undated) NOTE — LETTER
Date: 9/27/2019    Patient Name: Franklin Felty          To Whom it may concern: This letter has been written at the patient's request. The above patient was seen at the [facility] for treatment of a medical condition.     The patient may

## (undated) NOTE — LETTER
Leia Miller MD        I acknowledge that I have been informed of the risk involved by refusing the urine pregnancy test on Arabella Howard. I, thereby, release [facility], its personnel, and the attending physician from all responsibility for any ill effects which may result from this action.                             DATE _______________  TIME __________ AM/PM             _________________________________________________                                                                                                                                              Patient Signature        ____________________________________________________                                      Relationship          DATE _______________  TIME __________ AM/PM            __________________________________________________                                                                                                                                                       Witness Signature        Patient Name: Arabella Howard     : 1965    Page 1      Printed: 2023       Medical Record #: [id number]   Revised (03)

## (undated) NOTE — LETTER
05/30/18        [facility] Sep  [address] [facility]      Dear Nina Saez,    [address] [facility] records indicate that you have outstanding lab work and or testing that was ordered for you and has not yet been completed:          Phosphorus [E]      [place]

## (undated) NOTE — LETTER
03/26/18        [facility] [address]      Dear Aleyda Booth,    [address] [facility] records indicate that you have outstanding lab work and or testing that was ordered for you and has not yet been completed:          Phosphorus [E]      [place]

## (undated) NOTE — LETTER
Date: 9/30/2019    Patient Name: Susanne Perez          To Whom it may concern: This letter has been written at the patient's request. The above patient was seen at the [facility] for treatment of a medical condition.     The patient may

## (undated) NOTE — LETTER
Patient Name: Silvia Chi  YOB: 1965          MRN :  [id number]  Date:  4/15/2024  Referring Physician:  Skyler Hand    Discharge Summary  Pt has attended 11 visits in Physical Therapy. Silvia reports feeling >95% improvement in her shoulder pain and function. Pt reports that she is able to complete all her ADLs without pain or discomfort. She reports only having a slight problem/discomfort with reaching high up her back.  She has made excellent progress with her shoulder ROM and strength. She will be discharged from PT to a [facility] at this time with most goals met.     Objective:   Neck Disability Index Score  Score: 20 % (12/12/2023  4:45 PM)  Score: 30 % 2/27/2024  Score: 0% 4/15/2024       Quick Dash: 25/100 (3/11/2024)    cervical AROM: (* denotes performed with pain)  Flexion: 50 deg  Extension: 60 deg  Sidebending: R 35 deg; L 35 deg  Rotation: R 70 deg; L 70 deg     AROM: (* denotes performed with pain)  Shoulder 2/27/2024   Flexion: R 170 deg; L 170 deg  Abduction: R 180 deg; L 180 deg  ER: R 90 deg; L 80 deg  IR: R T7; L T5      Palpation: no tenderness noted on palpation of left shoulder    Strength: (* denotes performed with pain)  UE/Scapular   Shoulder Flex: R 5/5, L 5/5  Shoulder ABD (C5): R 5/5, L 5/5  Shoulder ER: R 5/5, L 5/5  Shoulder IR: R 5/5, L 5/5  Biceps (C6): R 55/5, L 5/5     Rhomboids: R 4/+5, L 4+/5  Mid trap: R 4+/5; L 4+/5          Goals:   (to be met in 12 visits)  Pt will improve shoulder abduction AROM to 180 degrees to improve ability to don deodorant, don/doff shirts, and wash hair. Met  Pt will increase shoulder AROM ER to 90 deg to be able to reach and fasten seatbelt. Not MEt but 80 deg and pain free  Pt will increase shoulder AROM IR to T7 pain free to be able to reach in back pocket, tuck in shirt, and turn steering wheel without pain. Met   Pt will improve shoulder strength throughout to 5/5 to improve function with carrying groceries, Met   Pt will demonstrate  increased mid/low trap strength to 4+/5 to promote improved shoulder mechanics and stabilization with lifting and reaching. Met   Pt will be independent and compliant with comprehensive HEP to maintain progress achieved in PT. Met       Plan: Recommend to discharge patient from PT to HEP.         Patient/Family/Caregiver was advised of these findings, precautions, and treatment options and has agreed to actively participate in planning and for this course of care.    Thank you for your referral. If you have any questions, please contact me at Dept: [phone].    Sincerely,  Electronically signed by therapist: Berto Carver, PT    Physician's certification required: No  Please co-sign or sign and return this letter via fax as soon as possible to [phone].   I certify the need for these services furnished under this plan of treatment and while under my care.    X___________________________________________________ Date____________________    Certification From: 4/15/2024  To:7/14/2024 21st Century Cures Act Notice to Patient: Medical documents like this are made available to patients in the interest of transparency. However, be advised this is a medical document and it is intended as yorf-iu-yorm communication between your medical providers. This medical document may contain abbreviations, assessments, medical data, and results or other terms that are unfamiliar. Medical documents are intended to carry relevant information, facts as evident, and the clinical opinion of the practitioner. As such, this medical document may be written in language that appears blunt or direct. You are encouraged to contact your medical provider and/or [facility] if you have any questions about this medical document.

## (undated) NOTE — LETTER
07/15/20        [facility] [address]      Dear Nasreen Aguilar records indicate that you have outstanding lab work and or testing that was ordered for you and has not yet been completed:  Orders Placed This Encounter